# Patient Record
Sex: MALE | Race: WHITE | NOT HISPANIC OR LATINO | Employment: OTHER | URBAN - METROPOLITAN AREA
[De-identification: names, ages, dates, MRNs, and addresses within clinical notes are randomized per-mention and may not be internally consistent; named-entity substitution may affect disease eponyms.]

---

## 2023-04-19 ENCOUNTER — APPOINTMENT (OUTPATIENT)
Dept: GENERAL RADIOLOGY | Age: 43
DRG: 240 | End: 2023-04-19
Attending: STUDENT IN AN ORGANIZED HEALTH CARE EDUCATION/TRAINING PROGRAM

## 2023-04-19 ENCOUNTER — APPOINTMENT (OUTPATIENT)
Dept: CT IMAGING | Age: 43
DRG: 240 | End: 2023-04-19
Attending: STUDENT IN AN ORGANIZED HEALTH CARE EDUCATION/TRAINING PROGRAM

## 2023-04-19 ENCOUNTER — HOSPITAL ENCOUNTER (INPATIENT)
Age: 43
LOS: 2 days | Discharge: HOME | DRG: 240 | End: 2023-04-21
Attending: EMERGENCY MEDICINE | Admitting: INTERNAL MEDICINE

## 2023-04-19 DIAGNOSIS — K62.89 ANAL PAIN: Primary | ICD-10-CM

## 2023-04-19 DIAGNOSIS — K62.89 RECTAL MASS: ICD-10-CM

## 2023-04-19 LAB
ABO + RH BLD: NORMAL
ALBUMIN SERPL-MCNC: 4 G/DL (ref 3.6–5.1)
ALBUMIN/GLOB SERPL: 1.2 {RATIO} (ref 1–2.4)
ALP SERPL-CCNC: 67 UNITS/L (ref 45–117)
ALT SERPL-CCNC: 20 UNITS/L
ANION GAP SERPL CALC-SCNC: 4 MMOL/L (ref 7–19)
APPEARANCE UR: CLEAR
AST SERPL-CCNC: 13 UNITS/L
BASOPHILS # BLD: 0 K/MCL (ref 0–0.3)
BASOPHILS NFR BLD: 0 %
BILIRUB SERPL-MCNC: 0.8 MG/DL (ref 0.2–1)
BILIRUB UR QL STRIP: NEGATIVE
BLD GP AB SCN SERPL QL GEL: NEGATIVE
BUN SERPL-MCNC: 14 MG/DL (ref 6–20)
BUN/CREAT SERPL: 20 (ref 7–25)
CALCIUM SERPL-MCNC: 9.3 MG/DL (ref 8.4–10.2)
CHLORIDE SERPL-SCNC: 109 MMOL/L (ref 97–110)
CO2 SERPL-SCNC: 30 MMOL/L (ref 21–32)
COLOR UR: ABNORMAL
CREAT SERPL-MCNC: 0.7 MG/DL (ref 0.67–1.17)
DEPRECATED RDW RBC: 47.6 FL (ref 39–50)
EOSINOPHIL # BLD: 0.1 K/MCL (ref 0–0.5)
EOSINOPHIL NFR BLD: 1 %
ERYTHROCYTE [DISTWIDTH] IN BLOOD: 13.2 % (ref 11–15)
FASTING DURATION TIME PATIENT: ABNORMAL H
GFR SERPLBLD BASED ON 1.73 SQ M-ARVRAT: >90 ML/MIN
GLOBULIN SER-MCNC: 3.3 G/DL (ref 2–4)
GLUCOSE SERPL-MCNC: 112 MG/DL (ref 70–99)
GLUCOSE UR STRIP-MCNC: NEGATIVE MG/DL
HCT VFR BLD CALC: 45.8 % (ref 39–51)
HGB BLD-MCNC: 15 G/DL (ref 13–17)
HGB UR QL STRIP: NEGATIVE
IMM GRANULOCYTES # BLD AUTO: 0 K/MCL (ref 0–0.2)
IMM GRANULOCYTES # BLD: 0 %
KETONES UR STRIP-MCNC: NEGATIVE MG/DL
LEUKOCYTE ESTERASE UR QL STRIP: NEGATIVE
LIPASE SERPL-CCNC: 147 UNITS/L (ref 73–393)
LYMPHOCYTES # BLD: 2.6 K/MCL (ref 1–4.8)
LYMPHOCYTES NFR BLD: 21 %
MCH RBC QN AUTO: 31.8 PG (ref 26–34)
MCHC RBC AUTO-ENTMCNC: 32.8 G/DL (ref 32–36.5)
MCV RBC AUTO: 97 FL (ref 78–100)
MONOCYTES # BLD: 0.6 K/MCL (ref 0.3–0.9)
MONOCYTES NFR BLD: 5 %
NEUTROPHILS # BLD: 8.8 K/MCL (ref 1.8–7.7)
NEUTROPHILS NFR BLD: 73 %
NITRITE UR QL STRIP: NEGATIVE
NRBC BLD MANUAL-RTO: 0 /100 WBC
PH UR STRIP: 8.5 [PH] (ref 5–7)
PLATELET # BLD AUTO: 258 K/MCL (ref 140–450)
POTASSIUM SERPL-SCNC: 4.5 MMOL/L (ref 3.4–5.1)
PROT SERPL-MCNC: 7.3 G/DL (ref 6.4–8.2)
PROT UR STRIP-MCNC: NEGATIVE MG/DL
RBC # BLD: 4.72 MIL/MCL (ref 4.5–5.9)
SODIUM SERPL-SCNC: 138 MMOL/L (ref 135–145)
SP GR UR STRIP: <1.005 (ref 1–1.03)
TYPE AND SCREEN EXPIRATION DATE: NORMAL
UROBILINOGEN UR STRIP-MCNC: 0.2 MG/DL
WBC # BLD: 12.2 K/MCL (ref 4.2–11)

## 2023-04-19 PROCEDURE — 96360 HYDRATION IV INFUSION INIT: CPT

## 2023-04-19 PROCEDURE — 10002800 HB RX 250 W HCPCS: Performed by: INTERNAL MEDICINE

## 2023-04-19 PROCEDURE — 36415 COLL VENOUS BLD VENIPUNCTURE: CPT | Performed by: INTERNAL MEDICINE

## 2023-04-19 PROCEDURE — 71045 X-RAY EXAM CHEST 1 VIEW: CPT

## 2023-04-19 PROCEDURE — 83690 ASSAY OF LIPASE: CPT | Performed by: EMERGENCY MEDICINE

## 2023-04-19 PROCEDURE — 10002807 HB RX 258: Performed by: STUDENT IN AN ORGANIZED HEALTH CARE EDUCATION/TRAINING PROGRAM

## 2023-04-19 PROCEDURE — 82378 CARCINOEMBRYONIC ANTIGEN: CPT | Performed by: STUDENT IN AN ORGANIZED HEALTH CARE EDUCATION/TRAINING PROGRAM

## 2023-04-19 PROCEDURE — 99285 EMERGENCY DEPT VISIT HI MDM: CPT | Performed by: EMERGENCY MEDICINE

## 2023-04-19 PROCEDURE — 99223 1ST HOSP IP/OBS HIGH 75: CPT | Performed by: INTERNAL MEDICINE

## 2023-04-19 PROCEDURE — 74177 CT ABD & PELVIS W/CONTRAST: CPT

## 2023-04-19 PROCEDURE — 81003 URINALYSIS AUTO W/O SCOPE: CPT | Performed by: STUDENT IN AN ORGANIZED HEALTH CARE EDUCATION/TRAINING PROGRAM

## 2023-04-19 PROCEDURE — 80053 COMPREHEN METABOLIC PANEL: CPT | Performed by: EMERGENCY MEDICINE

## 2023-04-19 PROCEDURE — 10002803 HB RX 637: Performed by: INTERNAL MEDICINE

## 2023-04-19 PROCEDURE — 85025 COMPLETE CBC W/AUTO DIFF WBC: CPT | Performed by: EMERGENCY MEDICINE

## 2023-04-19 PROCEDURE — G1004 CDSM NDSC: HCPCS

## 2023-04-19 PROCEDURE — 99285 EMERGENCY DEPT VISIT HI MDM: CPT

## 2023-04-19 PROCEDURE — 86900 BLOOD TYPING SEROLOGIC ABO: CPT | Performed by: STUDENT IN AN ORGANIZED HEALTH CARE EDUCATION/TRAINING PROGRAM

## 2023-04-19 PROCEDURE — 10002805 HB CONTRAST AGENT: Performed by: STUDENT IN AN ORGANIZED HEALTH CARE EDUCATION/TRAINING PROGRAM

## 2023-04-19 PROCEDURE — 10004651 HB RX, NO CHARGE ITEM: Performed by: INTERNAL MEDICINE

## 2023-04-19 PROCEDURE — 10000002 HB ROOM CHARGE MED SURG

## 2023-04-19 RX ORDER — POLYETHYLENE GLYCOL 3350 17 G/17G
17 POWDER, FOR SOLUTION ORAL DAILY
Status: DISCONTINUED | OUTPATIENT
Start: 2023-04-20 | End: 2023-04-21 | Stop reason: HOSPADM

## 2023-04-19 RX ORDER — LIDOCAINE HYDROCHLORIDE 20 MG/ML
JELLY TOPICAL PRN
Status: DISCONTINUED | OUTPATIENT
Start: 2023-04-19 | End: 2023-04-21 | Stop reason: HOSPADM

## 2023-04-19 RX ORDER — 0.9 % SODIUM CHLORIDE 0.9 %
2 VIAL (ML) INJECTION EVERY 12 HOURS SCHEDULED
Status: DISCONTINUED | OUTPATIENT
Start: 2023-04-19 | End: 2023-04-21 | Stop reason: HOSPADM

## 2023-04-19 RX ORDER — ONDANSETRON 2 MG/ML
4 INJECTION INTRAMUSCULAR; INTRAVENOUS EVERY 12 HOURS PRN
Status: DISCONTINUED | OUTPATIENT
Start: 2023-04-19 | End: 2023-04-21 | Stop reason: HOSPADM

## 2023-04-19 RX ORDER — ACETAMINOPHEN 325 MG/1
650 TABLET ORAL EVERY 4 HOURS PRN
Status: DISCONTINUED | OUTPATIENT
Start: 2023-04-19 | End: 2023-04-21 | Stop reason: HOSPADM

## 2023-04-19 RX ORDER — ENOXAPARIN SODIUM 100 MG/ML
40 INJECTION SUBCUTANEOUS AT BEDTIME
Status: DISCONTINUED | OUTPATIENT
Start: 2023-04-19 | End: 2023-04-21 | Stop reason: HOSPADM

## 2023-04-19 RX ORDER — SODIUM CHLORIDE, SODIUM LACTATE, POTASSIUM CHLORIDE, CALCIUM CHLORIDE 600; 310; 30; 20 MG/100ML; MG/100ML; MG/100ML; MG/100ML
INJECTION, SOLUTION INTRAVENOUS CONTINUOUS
Status: DISCONTINUED | OUTPATIENT
Start: 2023-04-19 | End: 2023-04-20

## 2023-04-19 RX ORDER — AMOXICILLIN 250 MG
2 CAPSULE ORAL NIGHTLY
Status: DISCONTINUED | OUTPATIENT
Start: 2023-04-19 | End: 2023-04-21 | Stop reason: HOSPADM

## 2023-04-19 RX ADMIN — ENOXAPARIN SODIUM 40 MG: 40 INJECTION SUBCUTANEOUS at 21:50

## 2023-04-19 RX ADMIN — IOHEXOL 84 ML: 350 INJECTION, SOLUTION INTRAVENOUS at 17:03

## 2023-04-19 RX ADMIN — SODIUM CHLORIDE, POTASSIUM CHLORIDE, SODIUM LACTATE AND CALCIUM CHLORIDE: 600; 310; 30; 20 INJECTION, SOLUTION INTRAVENOUS at 22:26

## 2023-04-19 RX ADMIN — SODIUM CHLORIDE, PRESERVATIVE FREE 2 ML: 5 INJECTION INTRAVENOUS at 21:50

## 2023-04-19 RX ADMIN — SENNOSIDES AND DOCUSATE SODIUM 2 TABLET: 50; 8.6 TABLET ORAL at 21:50

## 2023-04-19 RX ADMIN — SODIUM CHLORIDE, POTASSIUM CHLORIDE, SODIUM LACTATE AND CALCIUM CHLORIDE 1000 ML: 600; 310; 30; 20 INJECTION, SOLUTION INTRAVENOUS at 15:02

## 2023-04-19 SDOH — HEALTH STABILITY: PHYSICAL HEALTH: DO YOU HAVE SERIOUS DIFFICULTY WALKING OR CLIMBING STAIRS?: NO

## 2023-04-19 SDOH — ECONOMIC STABILITY: HOUSING INSECURITY: WHAT IS YOUR LIVING SITUATION TODAY?: OTHER (COMMENT)

## 2023-04-19 SDOH — ECONOMIC STABILITY: TRANSPORTATION INSECURITY
IN THE PAST 12 MONTHS, HAS LACK OF TRANSPORTATION KEPT YOU FROM MEETINGS, WORK, OR FROM GETTING THINGS NEEDED FOR DAILY LIVING?: NO

## 2023-04-19 SDOH — SOCIAL STABILITY: SOCIAL NETWORK: SUPPORT SYSTEMS: FRIENDS

## 2023-04-19 SDOH — HEALTH STABILITY: GENERAL
BECAUSE OF A PHYSICAL, MENTAL, OR EMOTIONAL CONDITION, DO YOU HAVE SERIOUS DIFFICULTY CONCENTRATING, REMEMBERING OR MAKING DECISIONS?: NO

## 2023-04-19 SDOH — SOCIAL STABILITY: SOCIAL NETWORK
HOW OFTEN DO YOU SEE OR TALK TO PEOPLE THAT YOU CARE ABOUT AND FEEL CLOSE TO? (FOR EXAMPLE: TALKING TO FRIENDS ON THE PHONE, VISITING FRIENDS OR FAMILY, GOING TO CHURCH OR CLUB MEETINGS): 5 OR MORE TIMES A WEEK

## 2023-04-19 SDOH — HEALTH STABILITY: PHYSICAL HEALTH: DO YOU HAVE DIFFICULTY DRESSING OR BATHING?: NO

## 2023-04-19 SDOH — HEALTH STABILITY: GENERAL: BECAUSE OF A PHYSICAL, MENTAL, OR EMOTIONAL CONDITION, DO YOU HAVE DIFFICULTY DOING ERRANDS ALONE?: NO

## 2023-04-19 SDOH — ECONOMIC STABILITY: GENERAL

## 2023-04-19 SDOH — ECONOMIC STABILITY: HOUSING INSECURITY: WHAT IS YOUR LIVING SITUATION TODAY?: ALONE

## 2023-04-19 SDOH — ECONOMIC STABILITY: TRANSPORTATION INSECURITY
IN THE PAST 12 MONTHS, HAS THE LACK OF TRANSPORTATION KEPT YOU FROM MEDICAL APPOINTMENTS OR FROM GETTING MEDICATIONS?: NO

## 2023-04-19 SDOH — ECONOMIC STABILITY: FOOD INSECURITY: HOW OFTEN IN THE PAST 12 MONTHS WERE YOU WORRIED OR STRESSED ABOUT HAVING ENOUGH MONEY TO BUY NUTRITIOUS MEALS?: ALWAYS

## 2023-04-19 ASSESSMENT — COLUMBIA-SUICIDE SEVERITY RATING SCALE - C-SSRS
IS THE PATIENT ABLE TO COMPLETE C-SSRS: YES
6. HAVE YOU EVER DONE ANYTHING, STARTED TO DO ANYTHING, OR PREPARED TO DO ANYTHING TO END YOUR LIFE?: NO
2. HAVE YOU ACTUALLY HAD ANY THOUGHTS OF KILLING YOURSELF?: NO
1. WITHIN THE PAST MONTH, HAVE YOU WISHED YOU WERE DEAD OR WISHED YOU COULD GO TO SLEEP AND NOT WAKE UP?: NO

## 2023-04-19 ASSESSMENT — ACTIVITIES OF DAILY LIVING (ADL)
ADL_SCORE: 12
ADL_SHORT_OF_BREATH: NO
ADL_BEFORE_ADMISSION: INDEPENDENT
RECENT_DECLINE_ADL: NO

## 2023-04-19 ASSESSMENT — LIFESTYLE VARIABLES
HOW MANY STANDARD DRINKS CONTAINING ALCOHOL DO YOU HAVE ON A TYPICAL DAY: 0,1 OR 2
HOW OFTEN DO YOU HAVE A DRINK CONTAINING ALCOHOL: NEVER
ALCOHOL_USE_STATUS: NO OR LOW RISK WITH VALIDATED TOOL
AUDIT-C TOTAL SCORE: 0
HOW OFTEN DO YOU HAVE 6 OR MORE DRINKS ON ONE OCCASION: NEVER

## 2023-04-19 ASSESSMENT — PATIENT HEALTH QUESTIONNAIRE - PHQ9: IS PATIENT ABLE TO COMPLETE PHQ2 OR PHQ9: NO, PATIENT WILL NEVER BE ABLE TO COMPLETE

## 2023-04-19 ASSESSMENT — ENCOUNTER SYMPTOMS
CONSTITUTIONAL NEGATIVE: 1
EYES NEGATIVE: 1
HEMATOLOGIC/LYMPHATIC NEGATIVE: 1
ALLERGIC/IMMUNOLOGIC NEGATIVE: 1
ENDOCRINE NEGATIVE: 1
RESPIRATORY NEGATIVE: 1
PSYCHIATRIC NEGATIVE: 1
NEUROLOGICAL NEGATIVE: 1
GASTROINTESTINAL NEGATIVE: 1

## 2023-04-19 ASSESSMENT — PAIN SCALES - GENERAL
PAINLEVEL_OUTOF10: 0
PAINLEVEL_OUTOF10: 7

## 2023-04-20 ENCOUNTER — APPOINTMENT (OUTPATIENT)
Dept: CT IMAGING | Age: 43
DRG: 240 | End: 2023-04-20
Attending: STUDENT IN AN ORGANIZED HEALTH CARE EDUCATION/TRAINING PROGRAM

## 2023-04-20 LAB
ALBUMIN SERPL-MCNC: 3.2 G/DL (ref 3.6–5.1)
ALBUMIN/GLOB SERPL: 1.3 {RATIO} (ref 1–2.4)
ALP SERPL-CCNC: 53 UNITS/L (ref 45–117)
ALT SERPL-CCNC: 14 UNITS/L
ANION GAP SERPL CALC-SCNC: 9 MMOL/L (ref 7–19)
AST SERPL-CCNC: 7 UNITS/L
ATRIAL RATE (BPM): 57
BASOPHILS # BLD: 0 K/MCL (ref 0–0.3)
BASOPHILS NFR BLD: 0 %
BILIRUB SERPL-MCNC: 0.5 MG/DL (ref 0.2–1)
BUN SERPL-MCNC: 15 MG/DL (ref 6–20)
BUN/CREAT SERPL: 23 (ref 7–25)
CALCIUM SERPL-MCNC: 8.9 MG/DL (ref 8.4–10.2)
CEA SERPL-MCNC: 3.1 NG/ML (ref 0–5)
CHLORIDE SERPL-SCNC: 111 MMOL/L (ref 97–110)
CO2 SERPL-SCNC: 24 MMOL/L (ref 21–32)
CREAT SERPL-MCNC: 0.66 MG/DL (ref 0.67–1.17)
DEPRECATED RDW RBC: 47.8 FL (ref 39–50)
EOSINOPHIL # BLD: 0.3 K/MCL (ref 0–0.5)
EOSINOPHIL NFR BLD: 3 %
ERYTHROCYTE [DISTWIDTH] IN BLOOD: 13.2 % (ref 11–15)
FASTING DURATION TIME PATIENT: ABNORMAL H
GFR SERPLBLD BASED ON 1.73 SQ M-ARVRAT: >90 ML/MIN
GLOBULIN SER-MCNC: 2.5 G/DL (ref 2–4)
GLUCOSE SERPL-MCNC: 96 MG/DL (ref 70–99)
HCT VFR BLD CALC: 39.6 % (ref 39–51)
HGB BLD-MCNC: 12.8 G/DL (ref 13–17)
IMM GRANULOCYTES # BLD AUTO: 0 K/MCL (ref 0–0.2)
IMM GRANULOCYTES # BLD: 0 %
LYMPHOCYTES # BLD: 3.7 K/MCL (ref 1–4.8)
LYMPHOCYTES NFR BLD: 38 %
MAGNESIUM SERPL-MCNC: 2.1 MG/DL (ref 1.7–2.4)
MCH RBC QN AUTO: 31.6 PG (ref 26–34)
MCHC RBC AUTO-ENTMCNC: 32.3 G/DL (ref 32–36.5)
MCV RBC AUTO: 97.8 FL (ref 78–100)
MONOCYTES # BLD: 0.6 K/MCL (ref 0.3–0.9)
MONOCYTES NFR BLD: 6 %
NEUTROPHILS # BLD: 5.1 K/MCL (ref 1.8–7.7)
NEUTROPHILS NFR BLD: 53 %
NRBC BLD MANUAL-RTO: 0 /100 WBC
P AXIS (DEGREES): 50
PHOSPHATE SERPL-MCNC: 3.8 MG/DL (ref 2.4–4.7)
PLATELET # BLD AUTO: 187 K/MCL (ref 140–450)
POTASSIUM SERPL-SCNC: 4.2 MMOL/L (ref 3.4–5.1)
PR-INTERVAL (MSEC): 111
PROT SERPL-MCNC: 5.7 G/DL (ref 6.4–8.2)
QRS-INTERVAL (MSEC): 83
QT-INTERVAL (MSEC): 393
QTC: 383
R AXIS (DEGREES): 79
RBC # BLD: 4.05 MIL/MCL (ref 4.5–5.9)
REPORT TEXT: NORMAL
SODIUM SERPL-SCNC: 140 MMOL/L (ref 135–145)
T AXIS (DEGREES): 46
VENTRICULAR RATE EKG/MIN (BPM): 57
WBC # BLD: 9.7 K/MCL (ref 4.2–11)

## 2023-04-20 PROCEDURE — 71250 CT THORAX DX C-: CPT

## 2023-04-20 PROCEDURE — 99233 SBSQ HOSP IP/OBS HIGH 50: CPT | Performed by: STUDENT IN AN ORGANIZED HEALTH CARE EDUCATION/TRAINING PROGRAM

## 2023-04-20 PROCEDURE — 93010 ELECTROCARDIOGRAM REPORT: CPT | Performed by: INTERNAL MEDICINE

## 2023-04-20 PROCEDURE — 83735 ASSAY OF MAGNESIUM: CPT | Performed by: STUDENT IN AN ORGANIZED HEALTH CARE EDUCATION/TRAINING PROGRAM

## 2023-04-20 PROCEDURE — 36415 COLL VENOUS BLD VENIPUNCTURE: CPT | Performed by: INTERNAL MEDICINE

## 2023-04-20 PROCEDURE — 84100 ASSAY OF PHOSPHORUS: CPT | Performed by: STUDENT IN AN ORGANIZED HEALTH CARE EDUCATION/TRAINING PROGRAM

## 2023-04-20 PROCEDURE — 10000002 HB ROOM CHARGE MED SURG

## 2023-04-20 PROCEDURE — 10002803 HB RX 637: Performed by: INTERNAL MEDICINE

## 2023-04-20 PROCEDURE — 80053 COMPREHEN METABOLIC PANEL: CPT | Performed by: INTERNAL MEDICINE

## 2023-04-20 PROCEDURE — 10002803 HB RX 637

## 2023-04-20 PROCEDURE — 85025 COMPLETE CBC W/AUTO DIFF WBC: CPT | Performed by: INTERNAL MEDICINE

## 2023-04-20 PROCEDURE — 10004651 HB RX, NO CHARGE ITEM: Performed by: INTERNAL MEDICINE

## 2023-04-20 PROCEDURE — 93005 ELECTROCARDIOGRAM TRACING: CPT | Performed by: STUDENT IN AN ORGANIZED HEALTH CARE EDUCATION/TRAINING PROGRAM

## 2023-04-20 RX ORDER — DEXTROSE, SODIUM CHLORIDE, SODIUM LACTATE, POTASSIUM CHLORIDE, AND CALCIUM CHLORIDE 5; .6; .31; .03; .02 G/100ML; G/100ML; G/100ML; G/100ML; G/100ML
INJECTION, SOLUTION INTRAVENOUS CONTINUOUS
Status: DISCONTINUED | OUTPATIENT
Start: 2023-04-20 | End: 2023-04-21

## 2023-04-20 RX ADMIN — SENNOSIDES AND DOCUSATE SODIUM 2 TABLET: 50; 8.6 TABLET ORAL at 22:19

## 2023-04-20 RX ADMIN — SODIUM CHLORIDE, PRESERVATIVE FREE 2 ML: 5 INJECTION INTRAVENOUS at 09:56

## 2023-04-20 RX ADMIN — SODIUM CHLORIDE, PRESERVATIVE FREE 2 ML: 5 INJECTION INTRAVENOUS at 22:20

## 2023-04-20 ASSESSMENT — PAIN SCALES - GENERAL
PAINLEVEL_OUTOF10: 5
PAINLEVEL_OUTOF10: 0

## 2023-04-21 VITALS
HEIGHT: 69 IN | OXYGEN SATURATION: 99 % | BODY MASS INDEX: 18.61 KG/M2 | SYSTOLIC BLOOD PRESSURE: 123 MMHG | HEART RATE: 59 BPM | RESPIRATION RATE: 14 BRPM | WEIGHT: 125.66 LBS | DIASTOLIC BLOOD PRESSURE: 83 MMHG | TEMPERATURE: 98.1 F

## 2023-04-21 LAB
ANION GAP SERPL CALC-SCNC: 10 MMOL/L (ref 7–19)
APTT PPP: 29 SEC (ref 22–30)
BASOPHILS # BLD: 0 K/MCL (ref 0–0.3)
BASOPHILS NFR BLD: 0 %
BUN SERPL-MCNC: 11 MG/DL (ref 6–20)
BUN/CREAT SERPL: 15 (ref 7–25)
CALCIUM SERPL-MCNC: 8.9 MG/DL (ref 8.4–10.2)
CHLORIDE SERPL-SCNC: 108 MMOL/L (ref 97–110)
CO2 SERPL-SCNC: 27 MMOL/L (ref 21–32)
CREAT SERPL-MCNC: 0.74 MG/DL (ref 0.67–1.17)
DEPRECATED RDW RBC: 45.3 FL (ref 39–50)
EOSINOPHIL # BLD: 0.1 K/MCL (ref 0–0.5)
EOSINOPHIL NFR BLD: 1 %
ERYTHROCYTE [DISTWIDTH] IN BLOOD: 13.1 % (ref 11–15)
FASTING DURATION TIME PATIENT: ABNORMAL H
GFR SERPLBLD BASED ON 1.73 SQ M-ARVRAT: >90 ML/MIN
GLUCOSE SERPL-MCNC: 105 MG/DL (ref 70–99)
HCT VFR BLD CALC: 38.4 % (ref 39–51)
HGB BLD-MCNC: 13.1 G/DL (ref 13–17)
IMM GRANULOCYTES # BLD AUTO: 0 K/MCL (ref 0–0.2)
IMM GRANULOCYTES # BLD: 0 %
INR PPP: 1
LYMPHOCYTES # BLD: 3.5 K/MCL (ref 1–4.8)
LYMPHOCYTES NFR BLD: 40 %
MCH RBC QN AUTO: 32.2 PG (ref 26–34)
MCHC RBC AUTO-ENTMCNC: 34.1 G/DL (ref 32–36.5)
MCV RBC AUTO: 94.3 FL (ref 78–100)
MONOCYTES # BLD: 0.6 K/MCL (ref 0.3–0.9)
MONOCYTES NFR BLD: 6 %
NEUTROPHILS # BLD: 4.5 K/MCL (ref 1.8–7.7)
NEUTROPHILS NFR BLD: 53 %
NRBC BLD MANUAL-RTO: 0 /100 WBC
PLATELET # BLD AUTO: 199 K/MCL (ref 140–450)
POTASSIUM SERPL-SCNC: 3.7 MMOL/L (ref 3.4–5.1)
PROTHROMBIN TIME: 10.9 SEC (ref 9.7–11.8)
RBC # BLD: 4.07 MIL/MCL (ref 4.5–5.9)
SODIUM SERPL-SCNC: 141 MMOL/L (ref 135–145)
WBC # BLD: 8.7 K/MCL (ref 4.2–11)

## 2023-04-21 PROCEDURE — S0310 HOSPITALIST VISIT: HCPCS | Performed by: STUDENT IN AN ORGANIZED HEALTH CARE EDUCATION/TRAINING PROGRAM

## 2023-04-21 PROCEDURE — 80048 BASIC METABOLIC PNL TOTAL CA: CPT | Performed by: STUDENT IN AN ORGANIZED HEALTH CARE EDUCATION/TRAINING PROGRAM

## 2023-04-21 PROCEDURE — 85610 PROTHROMBIN TIME: CPT

## 2023-04-21 PROCEDURE — 99239 HOSP IP/OBS DSCHRG MGMT >30: CPT | Performed by: INTERNAL MEDICINE

## 2023-04-21 PROCEDURE — 36415 COLL VENOUS BLD VENIPUNCTURE: CPT | Performed by: STUDENT IN AN ORGANIZED HEALTH CARE EDUCATION/TRAINING PROGRAM

## 2023-04-21 PROCEDURE — 10002807 HB RX 258: Performed by: INTERNAL MEDICINE

## 2023-04-21 PROCEDURE — 85730 THROMBOPLASTIN TIME PARTIAL: CPT

## 2023-04-21 PROCEDURE — 85025 COMPLETE CBC W/AUTO DIFF WBC: CPT | Performed by: STUDENT IN AN ORGANIZED HEALTH CARE EDUCATION/TRAINING PROGRAM

## 2023-04-21 RX ORDER — POLYETHYLENE GLYCOL 3350 17 G/17G
17 POWDER, FOR SOLUTION ORAL DAILY PRN
Qty: 30 PACKET | Refills: 0 | Status: SHIPPED | COMMUNITY
Start: 2023-04-21

## 2023-04-21 RX ORDER — POTASSIUM CHLORIDE 14.9 MG/ML
20 INJECTION INTRAVENOUS ONCE
Status: DISCONTINUED | OUTPATIENT
Start: 2023-04-21 | End: 2023-04-21 | Stop reason: HOSPADM

## 2023-04-21 RX ADMIN — SODIUM CHLORIDE, SODIUM LACTATE, POTASSIUM CHLORIDE, CALCIUM CHLORIDE AND DEXTROSE MONOHYDRATE: 5; 600; 310; 30; 20 INJECTION, SOLUTION INTRAVENOUS at 00:05

## 2023-04-21 ASSESSMENT — PAIN SCALES - GENERAL: PAINLEVEL_OUTOF10: 0

## 2024-04-29 ENCOUNTER — TELEPHONE (OUTPATIENT)
Dept: HEMATOLOGY/ONCOLOGY | Facility: HOSPITAL | Age: 44
End: 2024-04-29

## 2024-05-02 ENCOUNTER — TELEPHONE (OUTPATIENT)
Dept: HEMATOLOGY/ONCOLOGY | Facility: HOSPITAL | Age: 44
End: 2024-05-02

## 2024-05-02 NOTE — TELEPHONE ENCOUNTER
Per Marleny, patient is going to discuss with his referring MD and get back to the clinic if he would like to pursue a consult.

## 2024-05-02 NOTE — TELEPHONE ENCOUNTER
Joseph called back after I have tried to make consult with dr burkett. He said he was in terrible pain very uncomfortable and emotional. 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 I offered him the healthy driven van number and I offered to pay for it if he needed it. Joseph is trying to see only a surgeon that specializes in rectal cancer. He has no interest in chemo or radiation. miles

## 2024-07-23 ENCOUNTER — TELEPHONE (OUTPATIENT)
Dept: SURGERY | Facility: CLINIC | Age: 44
End: 2024-07-23

## 2024-07-23 NOTE — TELEPHONE ENCOUNTER
Andre from [facility] calling to ask if the emergency referral for this patient was received. She states it was faxed today at 3pm. Please call.

## 2024-07-24 NOTE — TELEPHONE ENCOUNTER
[facility] calling back to confirm if referral has been received. Please call [facility] or patient back.

## 2024-07-25 NOTE — TELEPHONE ENCOUNTER
Referral recvd.  I called patient 7-.  Patient has never been to the clinic.  This patient declined the appointments  offered due to transportation issues.  Patient requested to speak to provider before appointment and  I let him know he must be established and see the provider first. Patient expressed many hardships and reports he is in chronic pain.  I gave him a strong suggestion to go to ED for acute pain.  Unfortunately Marcelina Beverly is out of the office in Aug X 2 wks.      I will have the office  call him back and try to schedule him ASAP when he has assistance he has transportation.    Tiesha

## 2024-07-26 ENCOUNTER — TELEPHONE (OUTPATIENT)
Dept: SURGERY | Facility: CLINIC | Age: 44
End: 2024-07-26

## 2024-07-26 NOTE — TELEPHONE ENCOUNTER
Patient was scheduled for 8/12 @ 11am. Patient disclosed he was very sick and requested we be as efficient about getting him in as soon as possible. Tried to explain to patient where to go for appointment but patient stated he had a headache and wish to just know date and time of appointment.

## 2024-07-31 ENCOUNTER — HOSPITAL ENCOUNTER (INPATIENT)
Age: 44
DRG: 240 | End: 2024-07-31
Attending: EMERGENCY MEDICINE | Admitting: HOSPITALIST

## 2024-07-31 DIAGNOSIS — D64.9 ACUTE ANEMIA: Primary | ICD-10-CM

## 2024-07-31 DIAGNOSIS — K62.89 RECTAL MASS: ICD-10-CM

## 2024-07-31 PROCEDURE — 36415 COLL VENOUS BLD VENIPUNCTURE: CPT

## 2024-07-31 PROCEDURE — 85025 COMPLETE CBC W/AUTO DIFF WBC: CPT | Performed by: EMERGENCY MEDICINE

## 2024-07-31 PROCEDURE — 85046 RETICYTE/HGB CONCENTRATE: CPT | Performed by: INTERNAL MEDICINE

## 2024-07-31 PROCEDURE — 86900 BLOOD TYPING SEROLOGIC ABO: CPT | Performed by: EMERGENCY MEDICINE

## 2024-07-31 PROCEDURE — 36430 TRANSFUSION BLD/BLD COMPNT: CPT

## 2024-07-31 PROCEDURE — 80053 COMPREHEN METABOLIC PANEL: CPT | Performed by: EMERGENCY MEDICINE

## 2024-07-31 PROCEDURE — 0241U COVID/FLU/RSV PANEL: CPT | Performed by: EMERGENCY MEDICINE

## 2024-07-31 PROCEDURE — 99285 EMERGENCY DEPT VISIT HI MDM: CPT | Performed by: EMERGENCY MEDICINE

## 2024-07-31 PROCEDURE — 93005 ELECTROCARDIOGRAM TRACING: CPT | Performed by: EMERGENCY MEDICINE

## 2024-07-31 PROCEDURE — 99285 EMERGENCY DEPT VISIT HI MDM: CPT

## 2024-07-31 PROCEDURE — 93010 ELECTROCARDIOGRAM REPORT: CPT | Performed by: INTERNAL MEDICINE

## 2024-07-31 PROCEDURE — 83615 LACTATE (LD) (LDH) ENZYME: CPT | Performed by: INTERNAL MEDICINE

## 2024-07-31 SDOH — SOCIAL STABILITY: SOCIAL INSECURITY: HOW OFTEN DOES ANYONE, INCLUDING FAMILY AND FRIENDS, PHYSICALLY HURT YOU?: NEVER

## 2024-07-31 SDOH — SOCIAL STABILITY: SOCIAL INSECURITY: HOW OFTEN DOES ANYONE, INCLUDING FAMILY AND FRIENDS, SCREAM OR CURSE AT YOU?: NEVER

## 2024-07-31 SDOH — SOCIAL STABILITY: SOCIAL INSECURITY: HOW OFTEN DOES ANYONE, INCLUDING FAMILY AND FRIENDS, INSULT OR TALK DOWN TO YOU?: NEVER

## 2024-07-31 SDOH — SOCIAL STABILITY: SOCIAL INSECURITY: HOW OFTEN DOES ANYONE, INCLUDING FAMILY AND FRIENDS, THREATEN YOU WITH HARM?: NEVER

## 2024-07-31 ASSESSMENT — PAIN SCALES - GENERAL: PAINLEVEL_OUTOF10: 9

## 2024-08-01 ENCOUNTER — APPOINTMENT (OUTPATIENT)
Dept: CT IMAGING | Age: 44
DRG: 240 | End: 2024-08-01
Attending: INTERNAL MEDICINE

## 2024-08-01 PROBLEM — D64.9 ACUTE ANEMIA: Status: ACTIVE | Noted: 2024-08-01

## 2024-08-01 LAB
ABO + RH BLD: NORMAL
ALBUMIN SERPL-MCNC: 1.9 G/DL (ref 3.6–5.1)
ALBUMIN/GLOB SERPL: 0.5 {RATIO} (ref 1–2.4)
ALP SERPL-CCNC: 65 UNITS/L (ref 45–117)
ALT SERPL-CCNC: 9 UNITS/L
ANION GAP SERPL CALC-SCNC: 10 MMOL/L (ref 7–19)
ANNOTATION COMMENT IMP: NORMAL
APTT PPP: 29 SEC (ref 22–32)
AST SERPL-CCNC: 7 UNITS/L
ATRIAL RATE (BPM): 107
BASOPHILS # BLD: 0 K/MCL (ref 0–0.3)
BASOPHILS NFR BLD: 0 %
BILIRUB SERPL-MCNC: 0.1 MG/DL (ref 0.2–1)
BLD GP AB SCN SERPL QL GEL: NEGATIVE
BLOOD EXPIRATION DATE: NORMAL
BUN SERPL-MCNC: 11 MG/DL (ref 6–20)
BUN/CREAT SERPL: 17 (ref 7–25)
CALCIUM SERPL-MCNC: 8.5 MG/DL (ref 8.4–10.2)
CEA SERPL-MCNC: <2 NG/ML (ref 0–5)
CHLORIDE SERPL-SCNC: 107 MMOL/L (ref 97–110)
CO2 SERPL-SCNC: 27 MMOL/L (ref 21–32)
CREAT SERPL-MCNC: 0.63 MG/DL (ref 0.67–1.17)
CROSSMATCH RESULT: NORMAL
DEPRECATED RDW RBC: 48.6 FL (ref 39–50)
DISPENSE STATUS: NORMAL
EGFRCR SERPLBLD CKD-EPI 2021: >90 ML/MIN/{1.73_M2}
EOSINOPHIL # BLD: 0.1 K/MCL (ref 0–0.5)
EOSINOPHIL NFR BLD: 1 %
ERYTHROCYTE [DISTWIDTH] IN BLOOD: 18.1 % (ref 11–15)
FASTING DURATION TIME PATIENT: ABNORMAL H
FERRITIN SERPL-MCNC: 11 NG/ML (ref 26–388)
FLUAV RNA RESP QL NAA+PROBE: NOT DETECTED
FLUBV RNA RESP QL NAA+PROBE: NOT DETECTED
FOLATE SERPL-MCNC: 4.1 NG/ML
GLOBULIN SER-MCNC: 4.2 G/DL (ref 2–4)
GLUCOSE SERPL-MCNC: 104 MG/DL (ref 70–99)
HAPTOGLOB SERPL-MCNC: 347 MG/DL (ref 30–200)
HAV IGM SER QL: NEGATIVE
HBV CORE IGM SER QL: NEGATIVE
HBV SURFACE AB SER QL: NEGATIVE
HBV SURFACE AG SER QL: NEGATIVE
HCT VFR BLD CALC: 17.6 % (ref 39–51)
HCT VFR BLD CALC: 25.1 % (ref 39–51)
HCT VFR BLD CALC: 26.4 % (ref 39–51)
HCV AB SER QL: NEGATIVE
HGB BLD-MCNC: 4.9 G/DL (ref 13–17)
HGB BLD-MCNC: 7.5 G/DL (ref 13–17)
HGB BLD-MCNC: 7.6 G/DL (ref 13–17)
HGB RETIC QN AUTO: 16.9 PG (ref 28.6–36.3)
HIV 1+2 AB+HIV1 P24 AG SERPL QL IA: NONREACTIVE
IMM GRANULOCYTES # BLD AUTO: 0.1 K/MCL (ref 0–0.2)
IMM GRANULOCYTES # BLD: 1 %
IMM RETICS NFR: 30.2 % (ref 1.5–16)
IMP & REVIEW OF LAB RESULTS: NORMAL
INR PPP: 1
IRON SATN MFR SERPL: 25 % (ref 15–45)
IRON SERPL-MCNC: 60 MCG/DL (ref 65–175)
ISBT BLOOD TYPE: 600
ISSUE DATE/TIME: NORMAL
ISSUE DATE/TIME: NORMAL
LDH SERPL L TO P-CCNC: 193 UNITS/L (ref 86–234)
LYMPHOCYTES # BLD: 2.3 K/MCL (ref 1–4.8)
LYMPHOCYTES NFR BLD: 15 %
MCH RBC QN AUTO: 20.5 PG (ref 26–34)
MCHC RBC AUTO-ENTMCNC: 27.8 G/DL (ref 32–36.5)
MCV RBC AUTO: 73.6 FL (ref 78–100)
MONOCYTES # BLD: 0.9 K/MCL (ref 0.3–0.9)
MONOCYTES NFR BLD: 6 %
NEUTROPHILS # BLD: 12 K/MCL (ref 1.8–7.7)
NEUTROPHILS NFR BLD: 77 %
NRBC BLD MANUAL-RTO: 0 /100 WBC
P AXIS (DEGREES): 83
PLATELET # BLD AUTO: 721 K/MCL (ref 140–450)
POTASSIUM SERPL-SCNC: 3.5 MMOL/L (ref 3.4–5.1)
PR-INTERVAL (MSEC): 106
PRODUCT CODE: NORMAL
PRODUCT DESCRIPTION: NORMAL
PRODUCT ID: NORMAL
PROT SERPL-MCNC: 6.1 G/DL (ref 6.4–8.2)
PROTHROMBIN TIME: 11 SEC (ref 9.7–11.8)
QRS-INTERVAL (MSEC): 86
QT-INTERVAL (MSEC): 320
QTC: 427
R AXIS (DEGREES): 88
RAINBOW EXTRA TUBES HOLD SPECIMEN: NORMAL
RBC # BLD: 2.39 MIL/MCL (ref 4.5–5.9)
REPORT TEXT: NORMAL
RETICS #: 58 K/MCL (ref 10–120)
RETICS/RBC NFR: 2.4 % (ref 0.3–2.5)
RSV AG NPH QL IA.RAPID: NOT DETECTED
SARS-COV-2 RNA RESP QL NAA+PROBE: NOT DETECTED
SERVICE CMNT-IMP: NORMAL
SERVICE CMNT-IMP: NORMAL
SODIUM SERPL-SCNC: 140 MMOL/L (ref 135–145)
T AXIS (DEGREES): 64
TIBC SERPL-MCNC: 237 MCG/DL (ref 250–450)
TYPE AND SCREEN EXPIRATION DATE: NORMAL
UNIT BLOOD TYPE: NORMAL
UNIT NUMBER: NORMAL
VENTRICULAR RATE EKG/MIN (BPM): 107
VIT B12 SERPL-MCNC: 264 PG/ML (ref 211–911)
WBC # BLD: 15.4 K/MCL (ref 4.2–11)

## 2024-08-01 PROCEDURE — 87389 HIV-1 AG W/HIV-1&-2 AB AG IA: CPT | Performed by: INTERNAL MEDICINE

## 2024-08-01 PROCEDURE — 99497 ADVNCD CARE PLAN 30 MIN: CPT | Performed by: NURSE PRACTITIONER

## 2024-08-01 PROCEDURE — 85610 PROTHROMBIN TIME: CPT | Performed by: INTERNAL MEDICINE

## 2024-08-01 PROCEDURE — 99223 1ST HOSP IP/OBS HIGH 75: CPT | Performed by: NURSE PRACTITIONER

## 2024-08-01 PROCEDURE — 10002803 HB RX 637: Performed by: EMERGENCY MEDICINE

## 2024-08-01 PROCEDURE — 86923 COMPATIBILITY TEST ELECTRIC: CPT

## 2024-08-01 PROCEDURE — P9016 RBC LEUKOCYTES REDUCED: HCPCS

## 2024-08-01 PROCEDURE — 80074 ACUTE HEPATITIS PANEL: CPT | Performed by: INTERNAL MEDICINE

## 2024-08-01 PROCEDURE — 85730 THROMBOPLASTIN TIME PARTIAL: CPT | Performed by: INTERNAL MEDICINE

## 2024-08-01 PROCEDURE — 10002803 HB RX 637

## 2024-08-01 PROCEDURE — 86920 COMPATIBILITY TEST SPIN: CPT

## 2024-08-01 PROCEDURE — 82607 VITAMIN B-12: CPT | Performed by: INTERNAL MEDICINE

## 2024-08-01 PROCEDURE — 10002807 HB RX 258: Performed by: INTERNAL MEDICINE

## 2024-08-01 PROCEDURE — 10002800 HB RX 250 W HCPCS: Performed by: HOSPITALIST

## 2024-08-01 PROCEDURE — 85014 HEMATOCRIT: CPT

## 2024-08-01 PROCEDURE — 83010 ASSAY OF HAPTOGLOBIN QUANT: CPT | Performed by: INTERNAL MEDICINE

## 2024-08-01 PROCEDURE — 10000002 HB ROOM CHARGE MED SURG

## 2024-08-01 PROCEDURE — 82728 ASSAY OF FERRITIN: CPT | Performed by: INTERNAL MEDICINE

## 2024-08-01 PROCEDURE — 85018 HEMOGLOBIN: CPT

## 2024-08-01 PROCEDURE — 10002800 HB RX 250 W HCPCS: Performed by: NURSE PRACTITIONER

## 2024-08-01 PROCEDURE — 36430 TRANSFUSION BLD/BLD COMPNT: CPT

## 2024-08-01 PROCEDURE — 86706 HEP B SURFACE ANTIBODY: CPT | Performed by: INTERNAL MEDICINE

## 2024-08-01 PROCEDURE — 87040 BLOOD CULTURE FOR BACTERIA: CPT

## 2024-08-01 PROCEDURE — 10002807 HB RX 258

## 2024-08-01 PROCEDURE — 10002800 HB RX 250 W HCPCS: Performed by: INTERNAL MEDICINE

## 2024-08-01 PROCEDURE — 36415 COLL VENOUS BLD VENIPUNCTURE: CPT

## 2024-08-01 PROCEDURE — 83550 IRON BINDING TEST: CPT | Performed by: INTERNAL MEDICINE

## 2024-08-01 PROCEDURE — 10002805 HB CONTRAST AGENT: Performed by: INTERNAL MEDICINE

## 2024-08-01 PROCEDURE — 82378 CARCINOEMBRYONIC ANTIGEN: CPT | Performed by: INTERNAL MEDICINE

## 2024-08-01 PROCEDURE — 10002803 HB RX 637: Performed by: NURSE PRACTITIONER

## 2024-08-01 RX ORDER — ACETAMINOPHEN 160 MG/5ML
1000 LIQUID ORAL ONCE
Status: COMPLETED | OUTPATIENT
Start: 2024-08-01 | End: 2024-08-01

## 2024-08-01 RX ORDER — LACTULOSE 10 G/15ML
10-20 SOLUTION ORAL PRN
COMMUNITY

## 2024-08-01 RX ORDER — FOLIC ACID 1 MG/1
1 TABLET ORAL DAILY
Status: DISCONTINUED | OUTPATIENT
Start: 2024-08-02 | End: 2024-08-04 | Stop reason: HOSPADM

## 2024-08-01 RX ORDER — POTASSIUM CHLORIDE 1500 MG/1
20 TABLET, EXTENDED RELEASE ORAL ONCE
Status: COMPLETED | OUTPATIENT
Start: 2024-08-01 | End: 2024-08-01

## 2024-08-01 RX ORDER — SODIUM CHLORIDE 9 MG/ML
INJECTION, SOLUTION INTRAVENOUS CONTINUOUS
Status: DISCONTINUED | OUTPATIENT
Start: 2024-08-01 | End: 2024-08-03

## 2024-08-01 RX ORDER — LACTULOSE 10 G/15ML
10 SOLUTION ORAL DAILY PRN
Status: DISCONTINUED | OUTPATIENT
Start: 2024-08-01 | End: 2024-08-04 | Stop reason: HOSPADM

## 2024-08-01 RX ORDER — SODIUM CHLORIDE 9 MG/ML
INJECTION, SOLUTION INTRAVENOUS CONTINUOUS PRN
Status: DISCONTINUED | OUTPATIENT
Start: 2024-08-01 | End: 2024-08-04 | Stop reason: HOSPADM

## 2024-08-01 RX ORDER — POTASSIUM CHLORIDE 14.9 MG/ML
20 INJECTION INTRAVENOUS ONCE
Status: DISCONTINUED | OUTPATIENT
Start: 2024-08-01 | End: 2024-08-01

## 2024-08-01 RX ORDER — LEVOFLOXACIN 750 MG/1
750 TABLET, FILM COATED ORAL DAILY
COMMUNITY
Start: 2024-08-05 | End: 2024-08-18

## 2024-08-01 RX ORDER — ACETAMINOPHEN 650 MG/1
650 SUPPOSITORY RECTAL EVERY 4 HOURS PRN
Status: DISCONTINUED | OUTPATIENT
Start: 2024-08-01 | End: 2024-08-01

## 2024-08-01 RX ORDER — 0.9 % SODIUM CHLORIDE 0.9 %
2 VIAL (ML) INJECTION EVERY 12 HOURS SCHEDULED
Status: DISCONTINUED | OUTPATIENT
Start: 2024-08-01 | End: 2024-08-04 | Stop reason: HOSPADM

## 2024-08-01 RX ORDER — LIDOCAINE 40 MG/G
1 CREAM TOPICAL PRN
COMMUNITY

## 2024-08-01 RX ORDER — ACETAMINOPHEN 325 MG/1
975 TABLET ORAL ONCE
Status: DISCONTINUED | OUTPATIENT
Start: 2024-08-01 | End: 2024-08-01

## 2024-08-01 RX ORDER — ACETAMINOPHEN 160 MG/5ML
500-1000 LIQUID ORAL
COMMUNITY

## 2024-08-01 RX ORDER — ACETAMINOPHEN 325 MG/1
650 TABLET ORAL EVERY 4 HOURS PRN
Status: DISCONTINUED | OUTPATIENT
Start: 2024-08-01 | End: 2024-08-01 | Stop reason: CLARIF

## 2024-08-01 RX ORDER — HYDROCODONE BITARTRATE AND ACETAMINOPHEN 7.5; 325 MG/15ML; MG/15ML
15 SOLUTION ORAL EVERY 4 HOURS PRN
Status: DISCONTINUED | OUTPATIENT
Start: 2024-08-01 | End: 2024-08-04 | Stop reason: HOSPADM

## 2024-08-01 RX ORDER — POTASSIUM CHLORIDE 14.9 MG/ML
20 INJECTION INTRAVENOUS ONCE
Status: COMPLETED | OUTPATIENT
Start: 2024-08-01 | End: 2024-08-01

## 2024-08-01 RX ORDER — ACETAMINOPHEN 160 MG/5ML
325 LIQUID ORAL EVERY 4 HOURS PRN
Status: DISCONTINUED | OUTPATIENT
Start: 2024-08-01 | End: 2024-08-04 | Stop reason: HOSPADM

## 2024-08-01 RX ADMIN — ACETAMINOPHEN 325 MG: 650 SOLUTION ORAL at 21:47

## 2024-08-01 RX ADMIN — POTASSIUM CHLORIDE 20 MEQ: 14.9 INJECTION, SOLUTION INTRAVENOUS at 18:28

## 2024-08-01 RX ADMIN — PIPERACILLIN AND TAZOBACTAM 3.38 G: 3; .375 INJECTION, POWDER, FOR SOLUTION INTRAVENOUS at 21:41

## 2024-08-01 RX ADMIN — POTASSIUM CHLORIDE 20 MEQ: 1500 TABLET, EXTENDED RELEASE ORAL at 21:44

## 2024-08-01 RX ADMIN — SODIUM CHLORIDE: 9 INJECTION, SOLUTION INTRAVENOUS at 18:44

## 2024-08-01 RX ADMIN — ACETAMINOPHEN 1000 MG: 650 SOLUTION ORAL at 01:20

## 2024-08-01 RX ADMIN — MORPHINE SULFATE 2 MG: 2 INJECTION, SOLUTION INTRAMUSCULAR; INTRAVENOUS at 10:44

## 2024-08-01 RX ADMIN — ACETAMINOPHEN 1000 MG: 650 SOLUTION ORAL at 06:29

## 2024-08-01 RX ADMIN — IOHEXOL 12.5 ML: 350 INJECTION, SOLUTION INTRAVENOUS at 10:46

## 2024-08-01 RX ADMIN — PIPERACILLIN AND TAZOBACTAM 3.38 G: 3; .375 INJECTION, POWDER, LYOPHILIZED, FOR SOLUTION INTRAVENOUS; PARENTERAL at 16:07

## 2024-08-01 RX ADMIN — ACETAMINOPHEN 325 MG: 650 SOLUTION ORAL at 15:59

## 2024-08-01 RX ADMIN — HYDROCODONE BITARTRATE AND ACETAMINOPHEN 15 ML: 7.5; 325 SOLUTION ORAL at 12:31

## 2024-08-01 SDOH — HEALTH STABILITY: PHYSICAL HEALTH: DO YOU HAVE DIFFICULTY DRESSING OR BATHING?: NO

## 2024-08-01 SDOH — HEALTH STABILITY: PHYSICAL HEALTH: DO YOU HAVE SERIOUS DIFFICULTY WALKING OR CLIMBING STAIRS?: NO

## 2024-08-01 SDOH — ECONOMIC STABILITY: GENERAL

## 2024-08-01 SDOH — ECONOMIC STABILITY: FOOD INSECURITY: WITHIN THE PAST 12 MONTHS, THE FOOD YOU BOUGHT JUST DIDN'T LAST AND YOU DIDN'T HAVE MONEY TO GET MORE.: NEVER TRUE

## 2024-08-01 SDOH — SOCIAL STABILITY: SOCIAL INSECURITY: HOW OFTEN DOES ANYONE, INCLUDING FAMILY AND FRIENDS, THREATEN YOU WITH HARM?: NEVER

## 2024-08-01 SDOH — ECONOMIC STABILITY: HOUSING INSECURITY: WHAT IS YOUR LIVING SITUATION TODAY?: LIVING WITH OTHERS (TEMPORARY)

## 2024-08-01 SDOH — SOCIAL STABILITY: SOCIAL INSECURITY: HOW OFTEN DOES ANYONE, INCLUDING FAMILY AND FRIENDS, SCREAM OR CURSE AT YOU?: NEVER

## 2024-08-01 SDOH — ECONOMIC STABILITY: INCOME INSECURITY: IN THE PAST 12 MONTHS, HAS THE ELECTRIC, GAS, OIL, OR WATER COMPANY THREATENED TO SHUT OFF SERVICE IN YOUR HOME?: NO

## 2024-08-01 SDOH — ECONOMIC STABILITY: GENERAL: WOULD YOU LIKE HELP WITH ANY OF THE FOLLOWING NEEDS?: I DON'T WANT HELP WITH ANY OF THESE

## 2024-08-01 SDOH — SOCIAL STABILITY: SOCIAL NETWORK: SUPPORT SYSTEMS: FRIENDS

## 2024-08-01 SDOH — SOCIAL STABILITY: SOCIAL INSECURITY: HOW OFTEN DOES ANYONE, INCLUDING FAMILY AND FRIENDS, INSULT OR TALK DOWN TO YOU?: NEVER

## 2024-08-01 SDOH — ECONOMIC STABILITY: TRANSPORTATION INSECURITY
IN THE PAST 12 MONTHS, HAS LACK OF RELIABLE TRANSPORTATION KEPT YOU FROM MEDICAL APPOINTMENTS, MEETINGS, WORK OR FROM GETTING THINGS NEEDED FOR DAILY LIVING?: NO

## 2024-08-01 SDOH — ECONOMIC STABILITY: HOUSING INSECURITY: WHAT IS YOUR LIVING SITUATION TODAY?: ALONE

## 2024-08-01 SDOH — HEALTH STABILITY: GENERAL: BECAUSE OF A PHYSICAL, MENTAL, OR EMOTIONAL CONDITION, DO YOU HAVE DIFFICULTY DOING ERRANDS ALONE?: NO

## 2024-08-01 SDOH — ECONOMIC STABILITY: HOUSING INSECURITY: DO YOU HAVE PROBLEMS WITH ANY OF THE FOLLOWING?: NONE OF THE ABOVE

## 2024-08-01 SDOH — SOCIAL STABILITY: SOCIAL INSECURITY: HOW OFTEN DOES ANYONE, INCLUDING FAMILY AND FRIENDS, PHYSICALLY HURT YOU?: NEVER

## 2024-08-01 SDOH — ECONOMIC STABILITY: HOUSING INSECURITY: WHAT IS YOUR LIVING SITUATION TODAY?: I HAVE A STEADY PLACE TO LIVE

## 2024-08-01 ASSESSMENT — ENCOUNTER SYMPTOMS
SHORTNESS OF BREATH: 0
UNEXPECTED WEIGHT CHANGE: 1
NAUSEA: 0
DIZZINESS: 0
BLOOD IN STOOL: 0
FEVER: 0
CONSTIPATION: 0
VOMITING: 0
BLOOD IN STOOL: 1
HEADACHES: 0
ABDOMINAL PAIN: 0
TREMORS: 0
TROUBLE SWALLOWING: 1
COUGH: 0
UNEXPECTED WEIGHT CHANGE: 0
DIARRHEA: 0
NUMBNESS: 0
CONSTIPATION: 1
RESPIRATORY NEGATIVE: 1
PSYCHIATRIC NEGATIVE: 1
LIGHT-HEADEDNESS: 0
ANAL BLEEDING: 1
SPEECH DIFFICULTY: 0
ACTIVITY CHANGE: 1
FATIGUE: 1
APPETITE CHANGE: 1
ADENOPATHY: 0
WEAKNESS: 1
CHILLS: 0
ABDOMINAL PAIN: 1
RECTAL PAIN: 1
FACIAL ASYMMETRY: 0
DIAPHORESIS: 0
ABDOMINAL DISTENTION: 0

## 2024-08-01 ASSESSMENT — ACTIVITIES OF DAILY LIVING (ADL)
BATHING: INDEPENDENT
FEEDING: INDEPENDENT
DRESSING: INDEPENDENT
ADL_SHORT_OF_BREATH: NO
ADL_BEFORE_ADMISSION: INDEPENDENT
RECENT_DECLINE_ADL: NO
ADL_SCORE: 24
TOILETING: INDEPENDENT

## 2024-08-01 ASSESSMENT — COLUMBIA-SUICIDE SEVERITY RATING SCALE - C-SSRS
2. HAVE YOU ACTUALLY HAD ANY THOUGHTS OF KILLING YOURSELF?: NO
6. HAVE YOU EVER DONE ANYTHING, STARTED TO DO ANYTHING, OR PREPARED TO DO ANYTHING TO END YOUR LIFE?: NO
IS THE PATIENT ABLE TO COMPLETE C-SSRS: YES
1. WITHIN THE PAST MONTH, HAVE YOU WISHED YOU WERE DEAD OR WISHED YOU COULD GO TO SLEEP AND NOT WAKE UP?: NO

## 2024-08-01 ASSESSMENT — PATIENT HEALTH QUESTIONNAIRE - PHQ9
SUM OF ALL RESPONSES TO PHQ9 QUESTIONS 1 AND 2: 0
IS PATIENT ABLE TO COMPLETE PHQ2 OR PHQ9: YES
SUM OF ALL RESPONSES TO PHQ9 QUESTIONS 1 AND 2: 0
2. FEELING DOWN, DEPRESSED OR HOPELESS: NOT AT ALL
1. LITTLE INTEREST OR PLEASURE IN DOING THINGS: NOT AT ALL
CLINICAL INTERPRETATION OF PHQ2 SCORE: NO FURTHER SCREENING NEEDED

## 2024-08-01 ASSESSMENT — PAIN SCALES - GENERAL
PAINLEVEL_OUTOF10: 9
PAINLEVEL_OUTOF10: 7
PAINLEVEL_OUTOF10: 4
PAINLEVEL_OUTOF10: 5
PAINLEVEL_OUTOF10: 8

## 2024-08-01 ASSESSMENT — LIFESTYLE VARIABLES
HOW OFTEN DO YOU HAVE A DRINK CONTAINING ALCOHOL: NEVER
HOW OFTEN DO YOU HAVE 6 OR MORE DRINKS ON ONE OCCASION: NEVER
AUDIT-C TOTAL SCORE: 0
ALCOHOL_USE_STATUS: NO OR LOW RISK WITH VALIDATED TOOL
HOW MANY STANDARD DRINKS CONTAINING ALCOHOL DO YOU HAVE ON A TYPICAL DAY: 0,1 OR 2

## 2024-08-02 ENCOUNTER — APPOINTMENT (OUTPATIENT)
Dept: RADIATION ONCOLOGY | Age: 44
DRG: 240 | End: 2024-08-02
Attending: EMERGENCY MEDICINE

## 2024-08-02 ENCOUNTER — APPOINTMENT (OUTPATIENT)
Dept: MRI IMAGING | Age: 44
DRG: 240 | End: 2024-08-02
Attending: INTERNAL MEDICINE

## 2024-08-02 LAB
ALBUMIN SERPL-MCNC: 1.9 G/DL (ref 3.6–5.1)
ALBUMIN/GLOB SERPL: 0.5 {RATIO} (ref 1–2.4)
ALP SERPL-CCNC: 61 UNITS/L (ref 45–117)
ALT SERPL-CCNC: 11 UNITS/L
ANION GAP SERPL CALC-SCNC: 8 MMOL/L (ref 7–19)
AST SERPL-CCNC: 5 UNITS/L
BASOPHILS # BLD: 0 K/MCL (ref 0–0.3)
BASOPHILS NFR BLD: 0 %
BILIRUB SERPL-MCNC: 0.3 MG/DL (ref 0.2–1)
BUN SERPL-MCNC: 11 MG/DL (ref 6–20)
BUN/CREAT SERPL: 19 (ref 7–25)
CALCIUM SERPL-MCNC: 8.2 MG/DL (ref 8.4–10.2)
CHLORIDE SERPL-SCNC: 109 MMOL/L (ref 97–110)
CO2 SERPL-SCNC: 26 MMOL/L (ref 21–32)
CREAT SERPL-MCNC: 0.57 MG/DL (ref 0.67–1.17)
DEPRECATED RDW RBC: 55.8 FL (ref 39–50)
EGFRCR SERPLBLD CKD-EPI 2021: >90 ML/MIN/{1.73_M2}
EOSINOPHIL # BLD: 0.1 K/MCL (ref 0–0.5)
EOSINOPHIL NFR BLD: 1 %
ERYTHROCYTE [DISTWIDTH] IN BLOOD: 19.4 % (ref 11–15)
FASTING DURATION TIME PATIENT: ABNORMAL H
GLOBULIN SER-MCNC: 4.2 G/DL (ref 2–4)
GLUCOSE SERPL-MCNC: 120 MG/DL (ref 70–99)
HCT VFR BLD CALC: 25.3 % (ref 39–51)
HGB BLD-MCNC: 7.4 G/DL (ref 13–17)
IMM GRANULOCYTES # BLD AUTO: 0.1 K/MCL (ref 0–0.2)
IMM GRANULOCYTES # BLD: 1 %
LYMPHOCYTES # BLD: 1.7 K/MCL (ref 1–4.8)
LYMPHOCYTES NFR BLD: 10 %
MAGNESIUM SERPL-MCNC: 2.2 MG/DL (ref 1.7–2.4)
MCH RBC QN AUTO: 23.3 PG (ref 26–34)
MCHC RBC AUTO-ENTMCNC: 29.2 G/DL (ref 32–36.5)
MCV RBC AUTO: 79.8 FL (ref 78–100)
MONOCYTES # BLD: 0.9 K/MCL (ref 0.3–0.9)
MONOCYTES NFR BLD: 5 %
NEUTROPHILS # BLD: 15.2 K/MCL (ref 1.8–7.7)
NEUTROPHILS NFR BLD: 83 %
NRBC BLD MANUAL-RTO: 0 /100 WBC
PHOSPHATE SERPL-MCNC: 3.2 MG/DL (ref 2.4–4.7)
PLATELET # BLD AUTO: 636 K/MCL (ref 140–450)
POTASSIUM SERPL-SCNC: 3.5 MMOL/L (ref 3.4–5.1)
PROT SERPL-MCNC: 6.1 G/DL (ref 6.4–8.2)
RBC # BLD: 3.17 MIL/MCL (ref 4.5–5.9)
SODIUM SERPL-SCNC: 139 MMOL/L (ref 135–145)
WBC # BLD: 18.1 K/MCL (ref 4.2–11)

## 2024-08-02 PROCEDURE — 83735 ASSAY OF MAGNESIUM: CPT

## 2024-08-02 PROCEDURE — 74183 MRI ABD W/O CNTR FLWD CNTR: CPT

## 2024-08-02 PROCEDURE — 36415 COLL VENOUS BLD VENIPUNCTURE: CPT

## 2024-08-02 PROCEDURE — 10002803 HB RX 637

## 2024-08-02 PROCEDURE — 99233 SBSQ HOSP IP/OBS HIGH 50: CPT | Performed by: NURSE PRACTITIONER

## 2024-08-02 PROCEDURE — 10002801 HB RX 250 W/O HCPCS: Performed by: STUDENT IN AN ORGANIZED HEALTH CARE EDUCATION/TRAINING PROGRAM

## 2024-08-02 PROCEDURE — A9585 GADOBUTROL INJECTION: HCPCS | Performed by: INTERNAL MEDICINE

## 2024-08-02 PROCEDURE — 80053 COMPREHEN METABOLIC PANEL: CPT

## 2024-08-02 PROCEDURE — 85025 COMPLETE CBC W/AUTO DIFF WBC: CPT

## 2024-08-02 PROCEDURE — 88305 TISSUE EXAM BY PATHOLOGIST: CPT | Performed by: STUDENT IN AN ORGANIZED HEALTH CARE EDUCATION/TRAINING PROGRAM

## 2024-08-02 PROCEDURE — 10002805 HB CONTRAST AGENT: Performed by: INTERNAL MEDICINE

## 2024-08-02 PROCEDURE — 72197 MRI PELVIS W/O & W/DYE: CPT

## 2024-08-02 PROCEDURE — 0DBQ0ZX EXCISION OF ANUS, OPEN APPROACH, DIAGNOSTIC: ICD-10-PCS | Performed by: SURGERY

## 2024-08-02 PROCEDURE — 10000002 HB ROOM CHARGE MED SURG

## 2024-08-02 PROCEDURE — 10002803 HB RX 637: Performed by: NURSE PRACTITIONER

## 2024-08-02 PROCEDURE — 10002803 HB RX 637: Performed by: INTERNAL MEDICINE

## 2024-08-02 PROCEDURE — 84100 ASSAY OF PHOSPHORUS: CPT

## 2024-08-02 RX ORDER — FERROUS SULFATE 325(65) MG
325 TABLET ORAL EVERY OTHER DAY
Status: DISCONTINUED | OUTPATIENT
Start: 2024-08-03 | End: 2024-08-04 | Stop reason: HOSPADM

## 2024-08-02 RX ORDER — LIDOCAINE HYDROCHLORIDE 10 MG/ML
5 INJECTION, SOLUTION INFILTRATION; PERINEURAL ONCE
Status: COMPLETED | OUTPATIENT
Start: 2024-08-02 | End: 2024-08-02

## 2024-08-02 RX ORDER — GADOBUTROL 604.72 MG/ML
5 INJECTION INTRAVENOUS ONCE
Status: COMPLETED | OUTPATIENT
Start: 2024-08-02 | End: 2024-08-02

## 2024-08-02 RX ORDER — LIDOCAINE 4 G/G
1 PATCH TOPICAL DAILY
Status: DISCONTINUED | OUTPATIENT
Start: 2024-08-02 | End: 2024-08-04 | Stop reason: HOSPADM

## 2024-08-02 RX ADMIN — ACETAMINOPHEN 325 MG: 650 SOLUTION ORAL at 04:52

## 2024-08-02 RX ADMIN — LACTULOSE 10 G: 10 SOLUTION ORAL at 19:46

## 2024-08-02 RX ADMIN — ACETAMINOPHEN 325 MG: 650 SOLUTION ORAL at 11:13

## 2024-08-02 RX ADMIN — HYDROCODONE BITARTRATE AND ACETAMINOPHEN 15 ML: 7.5; 325 SOLUTION ORAL at 20:40

## 2024-08-02 RX ADMIN — HYDROCODONE BITARTRATE AND ACETAMINOPHEN 15 ML: 7.5; 325 SOLUTION ORAL at 06:05

## 2024-08-02 RX ADMIN — LIDOCAINE HYDROCHLORIDE 50 MG: 10 INJECTION, SOLUTION INFILTRATION; PERINEURAL at 13:37

## 2024-08-02 RX ADMIN — LIDOCAINE 1 PATCH: 4 PATCH TOPICAL at 14:16

## 2024-08-02 RX ADMIN — GADOBUTROL 5 ML: 604.72 INJECTION INTRAVENOUS at 22:42

## 2024-08-02 RX ADMIN — ACETAMINOPHEN 325 MG: 650 SOLUTION ORAL at 20:34

## 2024-08-02 RX ADMIN — FOLIC ACID 1 MG: 1 TABLET ORAL at 11:12

## 2024-08-02 ASSESSMENT — PAIN SCALES - GENERAL
PAINLEVEL_OUTOF10: 8
PAINLEVEL_OUTOF10: 8
PAINLEVEL_OUTOF10: 5
PAINLEVEL_OUTOF10: 7
PAINLEVEL_OUTOF10: 8
PAINLEVEL_OUTOF10: 5
PAINLEVEL_OUTOF10: 8
PAINLEVEL_OUTOF10: 8

## 2024-08-02 ASSESSMENT — ENCOUNTER SYMPTOMS
UNEXPECTED WEIGHT CHANGE: 1
TROUBLE SWALLOWING: 1
ABDOMINAL PAIN: 0
CONSTIPATION: 1
APPETITE CHANGE: 1
UNEXPECTED WEIGHT CHANGE: 0
NAUSEA: 0
ABDOMINAL DISTENTION: 0
FATIGUE: 1
CHILLS: 0
SHORTNESS OF BREATH: 0
ADENOPATHY: 0
DIZZINESS: 0
FEVER: 0
ACTIVITY CHANGE: 1
VOMITING: 0
BLOOD IN STOOL: 0
ANAL BLEEDING: 1
CONSTIPATION: 0
ABDOMINAL PAIN: 1
NEUROLOGICAL NEGATIVE: 1
APPETITE CHANGE: 0
COUGH: 0
RECTAL PAIN: 1
DIAPHORESIS: 0
DIARRHEA: 0
PSYCHIATRIC NEGATIVE: 1
RESPIRATORY NEGATIVE: 1
BLOOD IN STOOL: 1

## 2024-08-03 VITALS
HEIGHT: 69 IN | WEIGHT: 108.03 LBS | OXYGEN SATURATION: 97 % | RESPIRATION RATE: 16 BRPM | DIASTOLIC BLOOD PRESSURE: 76 MMHG | TEMPERATURE: 98.2 F | SYSTOLIC BLOOD PRESSURE: 115 MMHG | HEART RATE: 82 BPM | BODY MASS INDEX: 16 KG/M2

## 2024-08-03 LAB
ANION GAP SERPL CALC-SCNC: 9 MMOL/L (ref 7–19)
BASOPHILS # BLD: 0 K/MCL (ref 0–0.3)
BASOPHILS NFR BLD: 0 %
BUN SERPL-MCNC: 12 MG/DL (ref 6–20)
BUN/CREAT SERPL: 21 (ref 7–25)
CALCIUM SERPL-MCNC: 8.5 MG/DL (ref 8.4–10.2)
CHLORIDE SERPL-SCNC: 107 MMOL/L (ref 97–110)
CO2 SERPL-SCNC: 25 MMOL/L (ref 21–32)
CREAT SERPL-MCNC: 0.58 MG/DL (ref 0.67–1.17)
DEPRECATED RDW RBC: 58.7 FL (ref 39–50)
EGFRCR SERPLBLD CKD-EPI 2021: >90 ML/MIN/{1.73_M2}
EOSINOPHIL # BLD: 0.2 K/MCL (ref 0–0.5)
EOSINOPHIL NFR BLD: 1 %
ERYTHROCYTE [DISTWIDTH] IN BLOOD: 20.1 % (ref 11–15)
FASTING DURATION TIME PATIENT: ABNORMAL H
GLUCOSE SERPL-MCNC: 102 MG/DL (ref 70–99)
HCT VFR BLD CALC: 25.5 % (ref 39–51)
HGB BLD-MCNC: 7.4 G/DL (ref 13–17)
IMM GRANULOCYTES # BLD AUTO: 0.1 K/MCL (ref 0–0.2)
IMM GRANULOCYTES # BLD: 1 %
LYMPHOCYTES # BLD: 2.3 K/MCL (ref 1–4.8)
LYMPHOCYTES NFR BLD: 11 %
MCH RBC QN AUTO: 23.4 PG (ref 26–34)
MCHC RBC AUTO-ENTMCNC: 29 G/DL (ref 32–36.5)
MCV RBC AUTO: 80.7 FL (ref 78–100)
MONOCYTES # BLD: 1.2 K/MCL (ref 0.3–0.9)
MONOCYTES NFR BLD: 6 %
NEUTROPHILS # BLD: 16.4 K/MCL (ref 1.8–7.7)
NEUTROPHILS NFR BLD: 81 %
NRBC BLD MANUAL-RTO: 0 /100 WBC
PLATELET # BLD AUTO: 629 K/MCL (ref 140–450)
POTASSIUM SERPL-SCNC: 4 MMOL/L (ref 3.4–5.1)
RBC # BLD: 3.16 MIL/MCL (ref 4.5–5.9)
SODIUM SERPL-SCNC: 137 MMOL/L (ref 135–145)
WBC # BLD: 20.2 K/MCL (ref 4.2–11)

## 2024-08-03 PROCEDURE — 10000002 HB ROOM CHARGE MED SURG

## 2024-08-03 PROCEDURE — 10002803 HB RX 637

## 2024-08-03 PROCEDURE — 85025 COMPLETE CBC W/AUTO DIFF WBC: CPT

## 2024-08-03 PROCEDURE — 80048 BASIC METABOLIC PNL TOTAL CA: CPT

## 2024-08-03 PROCEDURE — 36415 COLL VENOUS BLD VENIPUNCTURE: CPT

## 2024-08-03 PROCEDURE — 10002803 HB RX 637: Performed by: INTERNAL MEDICINE

## 2024-08-03 RX ORDER — LACTULOSE 10 G/15ML
30 SOLUTION ORAL ONCE
Status: COMPLETED | OUTPATIENT
Start: 2024-08-03 | End: 2024-08-03

## 2024-08-03 RX ADMIN — ACETAMINOPHEN 325 MG: 650 SOLUTION ORAL at 15:24

## 2024-08-03 RX ADMIN — ACETAMINOPHEN 325 MG: 650 SOLUTION ORAL at 06:49

## 2024-08-03 RX ADMIN — LACTULOSE 30 G: 10 SOLUTION ORAL at 15:20

## 2024-08-03 ASSESSMENT — ENCOUNTER SYMPTOMS
FATIGUE: 1
RESPIRATORY NEGATIVE: 1
WEAKNESS: 1
UNEXPECTED WEIGHT CHANGE: 1
BACK PAIN: 1
CHILLS: 0
FEVER: 0
RECTAL PAIN: 1
CONSTIPATION: 1
DIAPHORESIS: 0
PSYCHIATRIC NEGATIVE: 1
ACTIVITY CHANGE: 1
APPETITE CHANGE: 1
ANAL BLEEDING: 1

## 2024-08-03 ASSESSMENT — PAIN SCALES - GENERAL
PAINLEVEL_OUTOF10: 8
PAINLEVEL_OUTOF10: 4
PAINLEVEL_OUTOF10: 8
PAINLEVEL_OUTOF10: 5

## 2024-08-04 VITALS
TEMPERATURE: 98.2 F | SYSTOLIC BLOOD PRESSURE: 115 MMHG | WEIGHT: 108.03 LBS | HEART RATE: 82 BPM | HEIGHT: 69 IN | RESPIRATION RATE: 16 BRPM | DIASTOLIC BLOOD PRESSURE: 76 MMHG | OXYGEN SATURATION: 97 % | BODY MASS INDEX: 16 KG/M2

## 2024-08-04 LAB
BACTERIA BLD CULT: NORMAL
BACTERIA BLD CULT: NORMAL

## 2024-08-04 PROCEDURE — 10002803 HB RX 637

## 2024-08-04 RX ADMIN — ACETAMINOPHEN 325 MG: 650 SOLUTION ORAL at 13:31

## 2024-08-04 ASSESSMENT — ENCOUNTER SYMPTOMS
ABDOMINAL PAIN: 1
BLOOD IN STOOL: 1
UNEXPECTED WEIGHT CHANGE: 1
FATIGUE: 1
APPETITE CHANGE: 1
COUGH: 0
ANAL BLEEDING: 1
DIZZINESS: 0
ADENOPATHY: 0
CONSTIPATION: 1

## 2024-08-04 ASSESSMENT — PAIN SCALES - GENERAL: PAINLEVEL_OUTOF10: 4

## 2024-08-06 LAB
ASR DISCLAIMER: NORMAL
CASE RPRT: NORMAL
CLINICAL INFO: NORMAL
PATH REPORT.FINAL DX SPEC: NORMAL
PATH REPORT.GROSS SPEC: NORMAL

## 2024-08-07 LAB
BACTERIA BLD CULT: NORMAL
BACTERIA BLD CULT: NORMAL

## 2024-08-12 ENCOUNTER — OFFICE VISIT (OUTPATIENT)
Dept: SURGERY | Facility: CLINIC | Age: 44
End: 2024-08-12

## 2024-08-12 VITALS — BODY MASS INDEX: 15.55 KG/M2 | WEIGHT: 105 LBS | HEIGHT: 69 IN

## 2024-08-12 DIAGNOSIS — C20 RECTAL CANCER (HCC): Primary | ICD-10-CM

## 2024-08-12 PROCEDURE — 99205 OFFICE O/P NEW HI 60 MIN: CPT | Performed by: SURGERY

## 2024-08-12 NOTE — H&P
History and Physical      HPI     Chief Complaint   Patient presents with    Cancer     Pt referred by Dr. Lawanda Powell regarding anal cancer.  Pt c/o  pain.        HPI  Joseph Angel is a 44 year old male who presents with advanced anal cancer.  He is seen several physicians at multiple institutions.  He is a  by [employer].  He is refused chemo radiation and palliative care.  I discussed there is no indication for primary surgical management.  He is best treated with chemo and radiation.  This will be followed by most likely abdominal perineal resection.  He is not interested in a colostomy.    No past medical history on file.  No past surgical history on file.  No current outpatient medications on file.     ALLERGIES  No Known Allergies    Social History     Socioeconomic History    Marital status: Single   Tobacco Use    Smoking status: Never    Smokeless tobacco: Never   Substance and Sexual Activity    Alcohol use: Never     No family history on file.    Review of Systems   A comprehensive 10 point review of systems was completed.  Pertinent positives and negatives noted in the the HPI.    PHYSICAL EXAM   Ht 5' 9\" (1.753 m)   Wt 105 lb (47.6 kg)   BMI 15.51 kg/m²  No LMP for male patient.   Constitutional: appears well hydrated alert and responsive no acute distress noted  Head/Face: normocephalic  Nose/Mouth/Throat: nose and throat are clear palate is intact mucous membranes are moist no oral lesions are noted  Neck/Thyroid: neck is supple without adenopathy  Respiratory: normal to inspection lungs are clear to auscultation bilaterally normal respiratory effort  Cardiovascular: regular rate and rhythm no murmurs, gallups, or rubs  Abdomen: soft non-tender non-distended no organomegaly noted no masses  Extremities: no edema, cyanosis, or clubbing  Neurological: exam appropriate for age reflexes and motor skills appropriate for age    Anal exam-fungating anal rectal cancer measuring at least 10 cm.   Foul-smelling with necrosis  ASSESSMENT/PLAN   Assessment   Encounter Diagnosis   Name Primary?    Rectal cancer (HCC) Yes       44 year old male with advanced anal cancer  We have discussed the surgical risks, benefits, alternatives, and expected recovery. We will plan chemoradiation followed by probable APR.  Patient is not interested at this point.  We discussed palliative care he is not interested in that we will give it some more thought.. All of the patient's questions have been answered to his satisfaction.       8/12/2024  Buzz Beverly MD

## 2024-08-13 ENCOUNTER — TELEPHONE (OUTPATIENT)
Dept: CARE COORDINATION | Age: 44
End: 2024-08-13

## 2024-08-14 ENCOUNTER — TELEPHONE (OUTPATIENT)
Dept: CARE COORDINATION | Age: 44
End: 2024-08-14

## 2024-08-20 ENCOUNTER — TELEPHONE (OUTPATIENT)
Dept: CARE COORDINATION | Age: 44
End: 2024-08-20

## 2024-08-21 ENCOUNTER — TELEPHONE (OUTPATIENT)
Dept: CARE COORDINATION | Age: 44
End: 2024-08-21

## 2024-08-27 ENCOUNTER — TELEPHONE (OUTPATIENT)
Dept: CARE COORDINATION | Age: 44
End: 2024-08-27

## 2024-09-03 ENCOUNTER — TELEPHONE (OUTPATIENT)
Dept: CARE COORDINATION | Age: 44
End: 2024-09-03

## 2024-10-28 ENCOUNTER — HOSPITAL ENCOUNTER (EMERGENCY)
Facility: HOSPITAL | Age: 44
Discharge: HOME | End: 2024-10-28
Attending: STUDENT IN AN ORGANIZED HEALTH CARE EDUCATION/TRAINING PROGRAM
Payer: MEDICAID

## 2024-10-28 VITALS
SYSTOLIC BLOOD PRESSURE: 103 MMHG | OXYGEN SATURATION: 99 % | DIASTOLIC BLOOD PRESSURE: 61 MMHG | TEMPERATURE: 97 F | RESPIRATION RATE: 21 BRPM | HEART RATE: 82 BPM

## 2024-10-28 DIAGNOSIS — K62.5 ANAL BLEEDING: Primary | ICD-10-CM

## 2024-10-28 DIAGNOSIS — C21.0 ANAL CANCER (HCC): ICD-10-CM

## 2024-10-28 LAB
ANION GAP SERPL CALC-SCNC: 7 MMOL/L (ref 0–18)
ANTIBODY SCREEN: NEGATIVE
APTT PPP: 28.8 SECONDS (ref 23–36)
BASOPHILS # BLD AUTO: 0.04 X10(3) UL (ref 0–0.2)
BASOPHILS NFR BLD AUTO: 0.2 %
BUN BLD-MCNC: 9 MG/DL (ref 9–23)
BUN/CREAT SERPL: 14.5 (ref 10–20)
CALCIUM BLD-MCNC: 9.1 MG/DL (ref 8.7–10.4)
CHLORIDE SERPL-SCNC: 103 MMOL/L (ref 98–112)
CO2 SERPL-SCNC: 24 MMOL/L (ref 21–32)
CREAT BLD-MCNC: 0.62 MG/DL
DEPRECATED RDW RBC AUTO: 61.4 FL (ref 35.1–46.3)
EGFRCR SERPLBLD CKD-EPI 2021: 121 ML/MIN/1.73M2 (ref 60–?)
EOSINOPHIL # BLD AUTO: 0.03 X10(3) UL (ref 0–0.7)
EOSINOPHIL NFR BLD AUTO: 0.2 %
ERYTHROCYTE [DISTWIDTH] IN BLOOD BY AUTOMATED COUNT: 19.9 % (ref 11–15)
GLUCOSE BLD-MCNC: 106 MG/DL (ref 70–99)
HCT VFR BLD AUTO: 22 %
HGB BLD-MCNC: 6.2 G/DL
IMM GRANULOCYTES # BLD AUTO: 0.1 X10(3) UL (ref 0–1)
IMM GRANULOCYTES NFR BLD: 0.6 %
INR BLD: 0.95 (ref 0.8–1.2)
LYMPHOCYTES # BLD AUTO: 1.56 X10(3) UL (ref 1–4)
LYMPHOCYTES NFR BLD AUTO: 9.2 %
MCH RBC QN AUTO: 23.8 PG (ref 26–34)
MCHC RBC AUTO-ENTMCNC: 28.2 G/DL (ref 31–37)
MCV RBC AUTO: 84.3 FL
MONOCYTES # BLD AUTO: 1.36 X10(3) UL (ref 0.1–1)
MONOCYTES NFR BLD AUTO: 8 %
NEUTROPHILS # BLD AUTO: 13.91 X10 (3) UL (ref 1.5–7.7)
NEUTROPHILS # BLD AUTO: 13.91 X10(3) UL (ref 1.5–7.7)
NEUTROPHILS NFR BLD AUTO: 81.8 %
OSMOLALITY SERPL CALC.SUM OF ELEC: 277 MOSM/KG (ref 275–295)
PLATELET # BLD AUTO: 560 10(3)UL (ref 150–450)
POTASSIUM SERPL-SCNC: 3.8 MMOL/L (ref 3.5–5.1)
PROTHROMBIN TIME: 13.3 SECONDS (ref 11.6–14.8)
RBC # BLD AUTO: 2.61 X10(6)UL
RH BLOOD TYPE: NEGATIVE
RH BLOOD TYPE: NEGATIVE
SODIUM SERPL-SCNC: 134 MMOL/L (ref 136–145)
WBC # BLD AUTO: 17 X10(3) UL (ref 4–11)

## 2024-10-28 PROCEDURE — 99285 EMERGENCY DEPT VISIT HI MDM: CPT

## 2024-10-28 PROCEDURE — 85025 COMPLETE CBC W/AUTO DIFF WBC: CPT | Performed by: STUDENT IN AN ORGANIZED HEALTH CARE EDUCATION/TRAINING PROGRAM

## 2024-10-28 PROCEDURE — 85730 THROMBOPLASTIN TIME PARTIAL: CPT | Performed by: STUDENT IN AN ORGANIZED HEALTH CARE EDUCATION/TRAINING PROGRAM

## 2024-10-28 PROCEDURE — 36430 TRANSFUSION BLD/BLD COMPNT: CPT

## 2024-10-28 PROCEDURE — 85610 PROTHROMBIN TIME: CPT | Performed by: STUDENT IN AN ORGANIZED HEALTH CARE EDUCATION/TRAINING PROGRAM

## 2024-10-28 PROCEDURE — 86850 RBC ANTIBODY SCREEN: CPT | Performed by: STUDENT IN AN ORGANIZED HEALTH CARE EDUCATION/TRAINING PROGRAM

## 2024-10-28 PROCEDURE — 36415 COLL VENOUS BLD VENIPUNCTURE: CPT

## 2024-10-28 PROCEDURE — 86901 BLOOD TYPING SEROLOGIC RH(D): CPT | Performed by: STUDENT IN AN ORGANIZED HEALTH CARE EDUCATION/TRAINING PROGRAM

## 2024-10-28 PROCEDURE — 86920 COMPATIBILITY TEST SPIN: CPT

## 2024-10-28 PROCEDURE — 80048 BASIC METABOLIC PNL TOTAL CA: CPT | Performed by: STUDENT IN AN ORGANIZED HEALTH CARE EDUCATION/TRAINING PROGRAM

## 2024-10-28 PROCEDURE — 86900 BLOOD TYPING SEROLOGIC ABO: CPT | Performed by: STUDENT IN AN ORGANIZED HEALTH CARE EDUCATION/TRAINING PROGRAM

## 2024-10-28 PROCEDURE — 85060 BLOOD SMEAR INTERPRETATION: CPT | Performed by: STUDENT IN AN ORGANIZED HEALTH CARE EDUCATION/TRAINING PROGRAM

## 2024-10-28 RX ORDER — ACETAMINOPHEN 160 MG/5ML
15 SOLUTION ORAL ONCE
Status: COMPLETED | OUTPATIENT
Start: 2024-10-28 | End: 2024-10-28

## 2024-10-28 NOTE — ED QUICK NOTES
Pt ambulatory to washroom with walker after infusion of one unit of PRBC's. States he's \"feeling better and rested\"   Awaiting second unit of blood, pt updated with plan of care. Verbalized understanding. All questions/concerns addressed at this time.

## 2024-10-28 NOTE — ED INITIAL ASSESSMENT (HPI)
Patient presents to ED with c/o rectal bleeding. 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 Patient reports having symptoms of low hgb. Patient reports being on palliative care at [facility].

## 2024-10-28 NOTE — ED PROVIDER NOTES
Patient Seen in: 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  He states that last night he picked a piece of the fungating mass while wiping after having a bowel movement and it started bleeding.  He stated he bled quite heavily but eventually was able to stop it with direct pressure.  Now feeling more short of breath with lightheadedness and feels like previously when he has been anemic.  He has declined chemo and radiation in the past.  He states he is on palliative care.  He had been evaluated by a surgeon in the past.     Objective:     History reviewed. No pertinent past medical history.           History reviewed. No pertinent surgical history.             Social History     Socioeconomic History    Marital status: Single   Tobacco Use    Smoking status: Never    Smokeless tobacco: Never   Substance and Sexual Activity    Alcohol use: Never     Social Drivers of Health     Financial Resource Strain: Low Risk  (8/1/2024)    Received from [facility]    Financial Resource Strain     In the past year, have you or any family members you live with been unable to get any of the following when it was really needed? Check all that apply.: None   Food Insecurity: Not on File (9/26/2024)    Received from [facility]    Food Insecurity     Food: 0   Transportation Needs: Not At Risk (8/1/2024)    Received from [facility]    Transportation Needs     In the past 12 months, has lack of reliable transportation kept you from medical appointments, meetings, work or from getting things needed for daily living? : No   Physical Activity: Not on File (4/1/2024)    Received from [facility]    Physical Activity     Physical Activity: 0   Stress: Not on File (4/1/2024)     Received from [facility]    Stress     Stress: 0   Social Connections: Not on File (2024)    Received from [facility]    [email or web address]     Connectedness: 0   Housing Stability: Not on File (2024)    Received from [facility]    Housing Stability     Housin                  Physical Exam     ED Triage Vitals [10/28/24 1220]   BP 98/71   Pulse 112   Resp 18   Temp 98 °F (36.7 °C)   Temp src Temporal   SpO2 100 %   O2 Device None (Room air)       Current Vitals:   Vital Signs  BP: 103/67  Pulse: 78  Resp: 17  Temp: 97 °F (36.1 °C)  Temp src: Oral  MAP (mmHg): 77    Oxygen Therapy  SpO2: 100 %  O2 Device: None (Room air)        Physical Exam  Constitutional: awake, alert, no sig distress  HENT: mmm, no lesions,  Neck: normal range of motion, no tenderness, supple.  Eyes: PERRL, EOMI, conjunctiva normal, no discharge. Sclera anicteric.  Cardiovascular: rr no murmur  Respiratory: Normal breath sounds, no respiratory distress, no wheezing, no chest tenderness.  GI: Bowel sounds normal, Soft, no tenderness, no masses, no pulsatile masses.  -Fungating anal mass no active bleeding with areas of necrosis  : No CVA tenderness.  Skin: Warm, dry, no erythema, no rash.  Musculoskeletal: Intact distal pulses, no edema, no tenderness, no cyanosis, no clubbing. Good range of motion in all major joints. No tenderness to palpation or major deformities noted. Back- No tenderness.  Neurologic: Alert & oriented x 3, normal motor function, normal sensory function, no focal deficits noted.  Psych: Calm, cooperative, nl affect        ED Course     Labs Reviewed   BASIC METABOLIC PANEL (8) - Abnormal; Notable for the following components:       Result Value    Glucose 106 (*)     Sodium 134 (*)     Creatinine 0.62 (*)     All other components within normal limits   CBC WITH DIFFERENTIAL WITH PLATELET - Abnormal; Notable for the following components:    WBC 17.0 (*)     RBC 2.61 (*)     HGB 6.2 (*)     HCT 22.0 (*)     MCH 23.8 (*)      MCHC 28.2 (*)     RDW-SD 61.4 (*)     RDW 19.9 (*)     .0 (*)     Neutrophil Absolute Prelim 13.91 (*)     Neutrophil Absolute 13.91 (*)     Monocyte Absolute 1.36 (*)     All other components within normal limits   PROTHROMBIN TIME (PT) - Normal   PTT, ACTIVATED - Normal   MD BLOOD SMEAR CONSULT   TYPE AND SCREEN    Narrative:     The following orders were created for panel order Type and screen.  Procedure                               Abnormality         Status                     ---------                               -----------         ------                     ABORH (Blood Type)[[id number]]                               Final result               Antibody Screen[[id number]]                                  Final result                 Please view results for these tests on the individual orders.   ABORH (BLOOD TYPE)   ANTIBODY SCREEN   PREPARE RBC   ABORH CONFIRMATION       ED Course as of 10/28/24 1815  ------------------------------------------------------------  Time: 10/28 1734  Comment: MRI PELVIS 8/2024  Large lesion extending inferiorly from the perineum measures over an   area of 7.1 x 4.4 cm, with some variable enhancement. Findings likely   relate to the known mass, however a degree of superimposed rectal prolapse   is difficult to exclude in the background. PET scan may be considered to   assess extent of uptake.   2. Portions of the lesion are difficult to differentiate from the posterior   aspect of the prostate gland. There is also diffuse ill-defined stranding   and enhancement in the perirectal fat.   3. There may be some smaller peripherally enhancing collections in the   pelvic sidewalls bilaterally, measuring up to 2.2 x 0.8 cm in size on the   right. This could relate to superimposed abscesses, though too small to   drain.   4. No metastatic disease to the soft tissues of the abdomen within the   confines of a limited exam.   5. Moderate to heavy colonic stool load. No dilated bowel  loops to indicate   mechanical obstruction are seen.   6. Additional background chronic appearing findings as described above.                 MDM      44-year-old male with history as documented above presenting for evaluation of anal bleeding.  On arrival vitals are stable and reassuring  On exam there is no active bleeding, patient is hemodynamically stable.  Suspect he has symptomatic anemia.  Will transfuse 2 units.  Hb 6.2.  -Patient denies other interventions and declines admission    Return precautions and follow-up instructions were discussed with patient who voiced understanding and agreement the plan.  All questions were answered to patient satisfaction.  Medical Decision Making      Disposition and Plan     Clinical Impression:  1. Anal bleeding    2. Anal cancer (HCC)         Disposition:  Discharge  10/28/2024  6:15 pm    Follow-up:  Nonstaff, Physician    Follow up in 2 day(s)      [facility] Emergency Department  [address] [facility] [id number] [phone]  Follow up  As needed, If symptoms worsen          Medications Prescribed:  Current Discharge Medication List              Supplementary Documentation:

## 2024-10-30 LAB
BLOOD TYPE BARCODE: 9500
UNIT VOLUME: 350 ML

## 2025-01-01 ENCOUNTER — HOSPITAL ENCOUNTER (INPATIENT)
Facility: HOSPITAL | Age: 45
LOS: 3 days | End: 2025-01-01
Attending: INTERNAL MEDICINE | Admitting: INTERNAL MEDICINE
Payer: OTHER MISCELLANEOUS

## 2025-01-01 ENCOUNTER — APPOINTMENT (OUTPATIENT)
Dept: CT IMAGING | Facility: HOSPITAL | Age: 45
End: 2025-01-01
Payer: MEDICAID

## 2025-01-01 ENCOUNTER — HOSPITAL ENCOUNTER (INPATIENT)
Facility: HOSPITAL | Age: 45
LOS: 8 days | Discharge: HOSPICE | End: 2025-01-01
Attending: EMERGENCY MEDICINE | Admitting: INTERNAL MEDICINE
Payer: MEDICAID

## 2025-01-01 VITALS
SYSTOLIC BLOOD PRESSURE: 63 MMHG | OXYGEN SATURATION: 92 % | RESPIRATION RATE: 12 BRPM | HEIGHT: 69.02 IN | TEMPERATURE: 101 F | HEART RATE: 142 BPM | WEIGHT: 87.06 LBS | DIASTOLIC BLOOD PRESSURE: 37 MMHG | BODY MASS INDEX: 12.89 KG/M2

## 2025-01-01 VITALS
WEIGHT: 87 LBS | DIASTOLIC BLOOD PRESSURE: 60 MMHG | SYSTOLIC BLOOD PRESSURE: 99 MMHG | HEART RATE: 104 BPM | TEMPERATURE: 98 F | BODY MASS INDEX: 12.88 KG/M2 | RESPIRATION RATE: 14 BRPM | HEIGHT: 69 IN | OXYGEN SATURATION: 100 %

## 2025-01-01 DIAGNOSIS — E87.1 HYPONATREMIA: ICD-10-CM

## 2025-01-01 DIAGNOSIS — C21.0 SQUAMOUS CELL CANCER, ANUS (HCC): Primary | ICD-10-CM

## 2025-01-01 DIAGNOSIS — K62.5 RECTAL BLEEDING: ICD-10-CM

## 2025-01-01 DIAGNOSIS — G89.3 CANCER ASSOCIATED PAIN: ICD-10-CM

## 2025-01-01 LAB
ALBUMIN SERPL-MCNC: 2.2 G/DL (ref 3.2–4.8)
ALBUMIN/GLOB SERPL: 0.9 {RATIO} (ref 1–2)
ALP LIVER SERPL-CCNC: 102 U/L (ref 45–117)
ALT SERPL-CCNC: 7 U/L (ref 10–49)
ANION GAP SERPL CALC-SCNC: 6 MMOL/L (ref 0–18)
ANION GAP SERPL CALC-SCNC: 6 MMOL/L (ref 0–18)
ANION GAP SERPL CALC-SCNC: 7 MMOL/L (ref 0–18)
ANION GAP SERPL CALC-SCNC: 9 MMOL/L (ref 0–18)
ANTIBODY SCREEN: NEGATIVE
AST SERPL-CCNC: 13 U/L (ref ?–34)
BASOPHILS # BLD AUTO: 0.02 X10(3) UL (ref 0–0.2)
BASOPHILS # BLD AUTO: 0.04 X10(3) UL (ref 0–0.2)
BASOPHILS # BLD AUTO: 0.04 X10(3) UL (ref 0–0.2)
BASOPHILS # BLD AUTO: 0.06 X10(3) UL (ref 0–0.2)
BASOPHILS NFR BLD AUTO: 0.1 %
BASOPHILS NFR BLD AUTO: 0.2 %
BILIRUB SERPL-MCNC: 0.5 MG/DL (ref 0.3–1.2)
BILIRUB UR QL: NEGATIVE
BLOOD TYPE BARCODE: 600
BLOOD TYPE BARCODE: 600
BUN BLD-MCNC: 11 MG/DL (ref 9–23)
BUN BLD-MCNC: 17 MG/DL (ref 9–23)
BUN BLD-MCNC: 8 MG/DL (ref 9–23)
BUN BLD-MCNC: 9 MG/DL (ref 9–23)
BUN/CREAT SERPL: 14.3 (ref 10–20)
BUN/CREAT SERPL: 15.4 (ref 10–20)
BUN/CREAT SERPL: 16.7 (ref 10–20)
BUN/CREAT SERPL: 20 (ref 10–20)
CALCIUM BLD-MCNC: 11.9 MG/DL (ref 8.7–10.4)
CALCIUM BLD-MCNC: 8.5 MG/DL (ref 8.7–10.4)
CALCIUM BLD-MCNC: 9.2 MG/DL (ref 8.7–10.4)
CALCIUM BLD-MCNC: 9.2 MG/DL (ref 8.7–10.4)
CHLORIDE SERPL-SCNC: 100 MMOL/L (ref 98–112)
CHLORIDE SERPL-SCNC: 104 MMOL/L (ref 98–112)
CHLORIDE SERPL-SCNC: 105 MMOL/L (ref 98–112)
CHLORIDE SERPL-SCNC: 108 MMOL/L (ref 98–112)
CLARITY UR: CLEAR
CO2 SERPL-SCNC: 21 MMOL/L (ref 21–32)
CO2 SERPL-SCNC: 24 MMOL/L (ref 21–32)
CREAT BLD-MCNC: 0.52 MG/DL (ref 0.7–1.3)
CREAT BLD-MCNC: 0.63 MG/DL (ref 0.7–1.3)
CREAT BLD-MCNC: 0.66 MG/DL (ref 0.7–1.3)
CREAT BLD-MCNC: 0.85 MG/DL (ref 0.7–1.3)
DEPRECATED RDW RBC AUTO: 51.3 FL (ref 35.1–46.3)
DEPRECATED RDW RBC AUTO: 53.1 FL (ref 35.1–46.3)
DEPRECATED RDW RBC AUTO: 53.1 FL (ref 35.1–46.3)
DEPRECATED RDW RBC AUTO: 55.2 FL (ref 35.1–46.3)
EGFRCR SERPLBLD CKD-EPI 2021: 109 ML/MIN/1.73M2 (ref 60–?)
EGFRCR SERPLBLD CKD-EPI 2021: 118 ML/MIN/1.73M2 (ref 60–?)
EGFRCR SERPLBLD CKD-EPI 2021: 120 ML/MIN/1.73M2 (ref 60–?)
EGFRCR SERPLBLD CKD-EPI 2021: 127 ML/MIN/1.73M2 (ref 60–?)
EOSINOPHIL # BLD AUTO: 0.02 X10(3) UL (ref 0–0.7)
EOSINOPHIL # BLD AUTO: 0.03 X10(3) UL (ref 0–0.7)
EOSINOPHIL # BLD AUTO: 0.06 X10(3) UL (ref 0–0.7)
EOSINOPHIL # BLD AUTO: 0.06 X10(3) UL (ref 0–0.7)
EOSINOPHIL NFR BLD AUTO: 0.1 %
EOSINOPHIL NFR BLD AUTO: 0.1 %
EOSINOPHIL NFR BLD AUTO: 0.2 %
EOSINOPHIL NFR BLD AUTO: 0.3 %
ERYTHROCYTE [DISTWIDTH] IN BLOOD BY AUTOMATED COUNT: 17.2 % (ref 11–15)
ERYTHROCYTE [DISTWIDTH] IN BLOOD BY AUTOMATED COUNT: 17.5 % (ref 11–15)
ERYTHROCYTE [DISTWIDTH] IN BLOOD BY AUTOMATED COUNT: 17.7 % (ref 11–15)
ERYTHROCYTE [DISTWIDTH] IN BLOOD BY AUTOMATED COUNT: 18.3 % (ref 11–15)
GLOBULIN PLAS-MCNC: 2.5 G/DL (ref 2–3.5)
GLUCOSE BLD-MCNC: 100 MG/DL (ref 70–99)
GLUCOSE BLD-MCNC: 105 MG/DL (ref 70–99)
GLUCOSE BLD-MCNC: 148 MG/DL (ref 70–99)
GLUCOSE BLD-MCNC: 94 MG/DL (ref 70–99)
GLUCOSE UR-MCNC: NORMAL MG/DL
HCT VFR BLD AUTO: 20.8 % (ref 39–53)
HCT VFR BLD AUTO: 22.4 % (ref 39–53)
HCT VFR BLD AUTO: 23.3 % (ref 39–53)
HCT VFR BLD AUTO: 27 % (ref 39–53)
HCT VFR BLD AUTO: 27.5 % (ref 39–53)
HCT VFR BLD AUTO: 29.4 % (ref 39–53)
HGB BLD-MCNC: 6 G/DL (ref 13–17.5)
HGB BLD-MCNC: 6.9 G/DL (ref 13–17.5)
HGB BLD-MCNC: 7.3 G/DL (ref 13–17.5)
HGB BLD-MCNC: 8 G/DL (ref 13–17.5)
HGB BLD-MCNC: 8.3 G/DL (ref 13–17.5)
HGB BLD-MCNC: 8.9 G/DL (ref 13–17.5)
HGB UR QL STRIP.AUTO: NEGATIVE
IMM GRANULOCYTES # BLD AUTO: 0.13 X10(3) UL (ref 0–1)
IMM GRANULOCYTES # BLD AUTO: 0.16 X10(3) UL (ref 0–1)
IMM GRANULOCYTES # BLD AUTO: 0.17 X10(3) UL (ref 0–1)
IMM GRANULOCYTES # BLD AUTO: 0.28 X10(3) UL (ref 0–1)
IMM GRANULOCYTES NFR BLD: 0.6 %
IMM GRANULOCYTES NFR BLD: 0.6 %
IMM GRANULOCYTES NFR BLD: 0.7 %
IMM GRANULOCYTES NFR BLD: 0.8 %
KETONES UR-MCNC: NEGATIVE MG/DL
LEUKOCYTE ESTERASE UR QL STRIP.AUTO: 75
LYMPHOCYTES # BLD AUTO: 0.99 X10(3) UL (ref 1–4)
LYMPHOCYTES # BLD AUTO: 1.38 X10(3) UL (ref 1–4)
LYMPHOCYTES # BLD AUTO: 1.39 X10(3) UL (ref 1–4)
LYMPHOCYTES # BLD AUTO: 2.66 X10(3) UL (ref 1–4)
LYMPHOCYTES NFR BLD AUTO: 4.8 %
LYMPHOCYTES NFR BLD AUTO: 5.3 %
LYMPHOCYTES NFR BLD AUTO: 5.4 %
LYMPHOCYTES NFR BLD AUTO: 7.7 %
MCH RBC QN AUTO: 23.9 PG (ref 26–34)
MCH RBC QN AUTO: 24.3 PG (ref 26–34)
MCH RBC QN AUTO: 24.8 PG (ref 26–34)
MCH RBC QN AUTO: 25.5 PG (ref 26–34)
MCHC RBC AUTO-ENTMCNC: 28.8 G/DL (ref 31–37)
MCHC RBC AUTO-ENTMCNC: 29.1 G/DL (ref 31–37)
MCHC RBC AUTO-ENTMCNC: 30.3 G/DL (ref 31–37)
MCHC RBC AUTO-ENTMCNC: 30.8 G/DL (ref 31–37)
MCV RBC AUTO: 81.9 FL (ref 80–100)
MCV RBC AUTO: 82.7 FL (ref 80–100)
MCV RBC AUTO: 82.9 FL (ref 80–100)
MCV RBC AUTO: 83.6 FL (ref 80–100)
MONOCYTES # BLD AUTO: 1.03 X10(3) UL (ref 0.1–1)
MONOCYTES # BLD AUTO: 1.6 X10(3) UL (ref 0.1–1)
MONOCYTES # BLD AUTO: 1.61 X10(3) UL (ref 0.1–1)
MONOCYTES # BLD AUTO: 2.07 X10(3) UL (ref 0.1–1)
MONOCYTES NFR BLD AUTO: 5 %
MONOCYTES NFR BLD AUTO: 6 %
MONOCYTES NFR BLD AUTO: 6.1 %
MONOCYTES NFR BLD AUTO: 6.2 %
NEUTROPHILS # BLD AUTO: 18.32 X10 (3) UL (ref 1.5–7.7)
NEUTROPHILS # BLD AUTO: 18.32 X10(3) UL (ref 1.5–7.7)
NEUTROPHILS # BLD AUTO: 22.66 X10 (3) UL (ref 1.5–7.7)
NEUTROPHILS # BLD AUTO: 22.66 X10(3) UL (ref 1.5–7.7)
NEUTROPHILS # BLD AUTO: 22.99 X10 (3) UL (ref 1.5–7.7)
NEUTROPHILS # BLD AUTO: 22.99 X10(3) UL (ref 1.5–7.7)
NEUTROPHILS # BLD AUTO: 29.3 X10 (3) UL (ref 1.5–7.7)
NEUTROPHILS # BLD AUTO: 29.3 X10(3) UL (ref 1.5–7.7)
NEUTROPHILS NFR BLD AUTO: 85.2 %
NEUTROPHILS NFR BLD AUTO: 87.3 %
NEUTROPHILS NFR BLD AUTO: 87.7 %
NEUTROPHILS NFR BLD AUTO: 89.2 %
NITRITE UR QL STRIP.AUTO: NEGATIVE
OSMOLALITY SERPL CALC.SUM OF ELEC: 272 MOSM/KG (ref 275–295)
OSMOLALITY SERPL CALC.SUM OF ELEC: 273 MOSM/KG (ref 275–295)
OSMOLALITY SERPL CALC.SUM OF ELEC: 278 MOSM/KG (ref 275–295)
OSMOLALITY SERPL CALC.SUM OF ELEC: 281 MOSM/KG (ref 275–295)
PH UR: 6 [PH] (ref 5–8)
PLATELET # BLD AUTO: 255 10(3)UL (ref 150–450)
PLATELET # BLD AUTO: 261 10(3)UL (ref 150–450)
PLATELET # BLD AUTO: 294 10(3)UL (ref 150–450)
PLATELET # BLD AUTO: 483 10(3)UL (ref 150–450)
POTASSIUM SERPL-SCNC: 3.1 MMOL/L (ref 3.5–5.1)
POTASSIUM SERPL-SCNC: 3.4 MMOL/L (ref 3.5–5.1)
POTASSIUM SERPL-SCNC: 3.5 MMOL/L (ref 3.5–5.1)
POTASSIUM SERPL-SCNC: 3.5 MMOL/L (ref 3.5–5.1)
POTASSIUM SERPL-SCNC: 3.7 MMOL/L (ref 3.5–5.1)
POTASSIUM SERPL-SCNC: 4.1 MMOL/L (ref 3.5–5.1)
POTASSIUM SERPL-SCNC: 4.4 MMOL/L (ref 3.5–5.1)
PROT SERPL-MCNC: 4.7 G/DL (ref 5.7–8.2)
PROT UR-MCNC: NEGATIVE MG/DL
RBC # BLD AUTO: 2.51 X10(6)UL (ref 4.3–5.7)
RBC # BLD AUTO: 2.71 X10(6)UL (ref 4.3–5.7)
RBC # BLD AUTO: 3.29 X10(6)UL (ref 4.3–5.7)
RBC # BLD AUTO: 3.59 X10(6)UL (ref 4.3–5.7)
RH BLOOD TYPE: NEGATIVE
SODIUM SERPL-SCNC: 130 MMOL/L (ref 136–145)
SODIUM SERPL-SCNC: 132 MMOL/L (ref 136–145)
SODIUM SERPL-SCNC: 135 MMOL/L (ref 136–145)
SODIUM SERPL-SCNC: 135 MMOL/L (ref 136–145)
SP GR UR STRIP: 1.01 (ref 1–1.03)
UNIT VOLUME: 350 ML
UNIT VOLUME: 350 ML
UROBILINOGEN UR STRIP-ACNC: NORMAL
WBC # BLD AUTO: 20.6 X10(3) UL (ref 4–11)
WBC # BLD AUTO: 25.9 X10(3) UL (ref 4–11)
WBC # BLD AUTO: 26.2 X10(3) UL (ref 4–11)
WBC # BLD AUTO: 34.4 X10(3) UL (ref 4–11)

## 2025-01-01 PROCEDURE — 99233 SBSQ HOSP IP/OBS HIGH 50: CPT | Performed by: INTERNAL MEDICINE

## 2025-01-01 PROCEDURE — 99498 ADVNCD CARE PLAN ADDL 30 MIN: CPT | Performed by: INTERNAL MEDICINE

## 2025-01-01 PROCEDURE — G0316 PROLNG IP/OBS E/M EA ADDL 15 MIN: HCPCS | Performed by: INTERNAL MEDICINE

## 2025-01-01 PROCEDURE — 99254 IP/OBS CNSLTJ NEW/EST MOD 60: CPT | Performed by: STUDENT IN AN ORGANIZED HEALTH CARE EDUCATION/TRAINING PROGRAM

## 2025-01-01 PROCEDURE — 99223 1ST HOSP IP/OBS HIGH 75: CPT | Performed by: SURGERY

## 2025-01-01 PROCEDURE — 99497 ADVNCD CARE PLAN 30 MIN: CPT | Performed by: INTERNAL MEDICINE

## 2025-01-01 PROCEDURE — 99223 1ST HOSP IP/OBS HIGH 75: CPT | Performed by: INTERNAL MEDICINE

## 2025-01-01 PROCEDURE — 30233N1 TRANSFUSION OF NONAUTOLOGOUS RED BLOOD CELLS INTO PERIPHERAL VEIN, PERCUTANEOUS APPROACH: ICD-10-PCS | Performed by: INTERNAL MEDICINE

## 2025-01-01 RX ORDER — POTASSIUM CHLORIDE 14.9 MG/ML
20 INJECTION INTRAVENOUS ONCE
Status: COMPLETED | OUTPATIENT
Start: 2025-01-01 | End: 2025-01-01

## 2025-01-01 RX ORDER — ACETAMINOPHEN 10 MG/ML
15 INJECTION, SOLUTION INTRAVENOUS EVERY 8 HOURS PRN
Status: DISCONTINUED | OUTPATIENT
Start: 2025-01-01 | End: 2025-01-01

## 2025-01-01 RX ORDER — HYDROXYZINE HCL 10 MG/5 ML
10 SOLUTION, ORAL ORAL 3 TIMES DAILY
Status: DISCONTINUED | OUTPATIENT
Start: 2025-01-01 | End: 2025-01-01

## 2025-01-01 RX ORDER — DIPHENHYDRAMINE HYDROCHLORIDE 50 MG/ML
12.5 INJECTION, SOLUTION INTRAMUSCULAR; INTRAVENOUS EVERY 4 HOURS PRN
Status: DISCONTINUED | OUTPATIENT
Start: 2025-01-01 | End: 2025-01-01

## 2025-01-01 RX ORDER — HALOPERIDOL 5 MG/ML
2 INJECTION INTRAMUSCULAR
Status: DISCONTINUED | OUTPATIENT
Start: 2025-01-01 | End: 2025-06-12

## 2025-01-01 RX ORDER — HYDROMORPHONE HYDROCHLORIDE 1 MG/ML
1 INJECTION, SOLUTION INTRAMUSCULAR; INTRAVENOUS; SUBCUTANEOUS EVERY 2 HOUR PRN
Refills: 0 | Status: DISCONTINUED | OUTPATIENT
Start: 2025-01-01 | End: 2025-06-12

## 2025-01-01 RX ORDER — SODIUM CHLORIDE 9 MG/ML
INJECTION, SOLUTION INTRAVENOUS ONCE
Status: COMPLETED | OUTPATIENT
Start: 2025-01-01 | End: 2025-01-01

## 2025-01-01 RX ORDER — DIPHENHYDRAMINE HYDROCHLORIDE 50 MG/ML
25 INJECTION, SOLUTION INTRAMUSCULAR; INTRAVENOUS EVERY 4 HOURS PRN
Status: DISCONTINUED | OUTPATIENT
Start: 2025-01-01 | End: 2025-06-12

## 2025-01-01 RX ORDER — POTASSIUM CHLORIDE 14.9 MG/ML
20 INJECTION INTRAVENOUS ONCE
Status: DISCONTINUED | OUTPATIENT
Start: 2025-01-01 | End: 2025-01-01

## 2025-01-01 RX ORDER — MORPHINE SULFATE 2 MG/ML
2 INJECTION, SOLUTION INTRAMUSCULAR; INTRAVENOUS ONCE
Status: COMPLETED | OUTPATIENT
Start: 2025-01-01 | End: 2025-01-01

## 2025-01-01 RX ORDER — SODIUM CHLORIDE 0.9 % (FLUSH) 0.9 %
10 SYRINGE (ML) INJECTION AS NEEDED
Status: DISCONTINUED | OUTPATIENT
Start: 2025-01-01 | End: 2025-06-12

## 2025-01-01 RX ORDER — LORAZEPAM 2 MG/ML
1 INJECTION INTRAMUSCULAR EVERY 4 HOURS PRN
Status: DISCONTINUED | OUTPATIENT
Start: 2025-01-01 | End: 2025-01-01

## 2025-01-01 RX ORDER — MORPHINE SULFATE 4 MG/ML
4 INJECTION, SOLUTION INTRAMUSCULAR; INTRAVENOUS ONCE
Status: COMPLETED | OUTPATIENT
Start: 2025-01-01 | End: 2025-01-01

## 2025-01-01 RX ORDER — ONDANSETRON 2 MG/ML
4 INJECTION INTRAMUSCULAR; INTRAVENOUS EVERY 4 HOURS PRN
Status: DISCONTINUED | OUTPATIENT
Start: 2025-01-01 | End: 2025-01-01

## 2025-01-01 RX ORDER — SCOPOLAMINE 1 MG/3D
1 PATCH, EXTENDED RELEASE TRANSDERMAL
Status: DISCONTINUED | OUTPATIENT
Start: 2025-01-01 | End: 2025-06-12

## 2025-01-01 RX ORDER — MORPHINE SULFATE 10 MG/5ML
12 SOLUTION ORAL EVERY 6 HOURS
Refills: 0 | Status: DISCONTINUED | OUTPATIENT
Start: 2025-01-01 | End: 2025-01-01

## 2025-01-01 RX ORDER — ONDANSETRON 2 MG/ML
4 INJECTION INTRAMUSCULAR; INTRAVENOUS EVERY 6 HOURS PRN
Status: DISCONTINUED | OUTPATIENT
Start: 2025-01-01 | End: 2025-06-12

## 2025-01-01 RX ORDER — FENTANYL 12.5 UG/1
1 PATCH TRANSDERMAL
Refills: 0 | Status: DISCONTINUED | OUTPATIENT
Start: 2025-01-01 | End: 2025-06-12

## 2025-01-01 RX ORDER — ONDANSETRON 8 MG/1
8 TABLET, ORALLY DISINTEGRATING ORAL EVERY 6 HOURS PRN
Qty: 30 TABLET | Refills: 0 | Status: SHIPPED | OUTPATIENT
Start: 2025-01-01 | End: 2025-06-12

## 2025-01-01 RX ORDER — DIPHENHYDRAMINE HCL 25 MG
25 CAPSULE ORAL EVERY 4 HOURS PRN
Status: DISCONTINUED | OUTPATIENT
Start: 2025-01-01 | End: 2025-06-12

## 2025-01-01 RX ORDER — LORAZEPAM 2 MG/ML
1 INJECTION INTRAMUSCULAR EVERY 4 HOURS PRN
Status: DISCONTINUED | OUTPATIENT
Start: 2025-01-01 | End: 2025-06-12

## 2025-01-01 RX ORDER — HYDROMORPHONE HYDROCHLORIDE 1 MG/ML
2 INJECTION, SOLUTION INTRAMUSCULAR; INTRAVENOUS; SUBCUTANEOUS EVERY 2 HOUR PRN
Refills: 0 | Status: DISCONTINUED | OUTPATIENT
Start: 2025-01-01 | End: 2025-06-12

## 2025-01-01 RX ORDER — ACETAMINOPHEN 10 MG/ML
15 INJECTION, SOLUTION INTRAVENOUS EVERY 6 HOURS PRN
Status: DISCONTINUED | OUTPATIENT
Start: 2025-01-01 | End: 2025-01-01

## 2025-01-01 RX ORDER — FUROSEMIDE 10 MG/ML
40 INJECTION INTRAMUSCULAR; INTRAVENOUS EVERY 8 HOURS PRN
Status: DISCONTINUED | OUTPATIENT
Start: 2025-01-01 | End: 2025-06-12

## 2025-01-01 RX ORDER — ACETAMINOPHEN 500 MG
1000 TABLET ORAL EVERY 6 HOURS PRN
Status: DISCONTINUED | OUTPATIENT
Start: 2025-01-01 | End: 2025-01-01

## 2025-01-01 RX ORDER — GLYCOPYRROLATE 0.2 MG/ML
0.4 INJECTION, SOLUTION INTRAMUSCULAR; INTRAVENOUS
Status: DISCONTINUED | OUTPATIENT
Start: 2025-01-01 | End: 2025-06-12

## 2025-01-01 RX ORDER — LACTULOSE 10 G/15ML
10 SOLUTION ORAL 3 TIMES DAILY PRN
Status: DISCONTINUED | OUTPATIENT
Start: 2025-01-01 | End: 2025-01-01

## 2025-01-01 RX ORDER — HALOPERIDOL 5 MG/ML
1 INJECTION INTRAMUSCULAR
Status: DISCONTINUED | OUTPATIENT
Start: 2025-01-01 | End: 2025-06-12

## 2025-01-01 RX ORDER — POTASSIUM CHLORIDE 1.5 G/1.58G
40 POWDER, FOR SOLUTION ORAL EVERY 4 HOURS
Status: DISPENSED | OUTPATIENT
Start: 2025-01-01 | End: 2025-01-01

## 2025-01-01 RX ORDER — LORAZEPAM 2 MG/ML
2 INJECTION INTRAMUSCULAR EVERY 4 HOURS PRN
Status: DISCONTINUED | OUTPATIENT
Start: 2025-01-01 | End: 2025-06-12

## 2025-01-01 RX ORDER — HYDROXYZINE HCL 10 MG/5 ML
10 SOLUTION, ORAL ORAL EVERY 6 HOURS PRN
Status: DISCONTINUED | OUTPATIENT
Start: 2025-01-01 | End: 2025-01-01

## 2025-01-01 RX ORDER — POTASSIUM CHLORIDE 1500 MG/1
40 TABLET, EXTENDED RELEASE ORAL EVERY 4 HOURS
Status: DISCONTINUED | OUTPATIENT
Start: 2025-01-01 | End: 2025-01-01

## 2025-01-01 RX ORDER — ONDANSETRON 4 MG/1
8 TABLET, ORALLY DISINTEGRATING ORAL EVERY 6 HOURS PRN
Status: DISCONTINUED | OUTPATIENT
Start: 2025-01-01 | End: 2025-01-01

## 2025-01-01 RX ORDER — ATROPINE SULFATE 10 MG/ML
2 SOLUTION/ DROPS OPHTHALMIC EVERY 2 HOUR PRN
Status: DISCONTINUED | OUTPATIENT
Start: 2025-01-01 | End: 2025-06-12

## 2025-01-01 RX ORDER — MELATONIN
100 DAILY
Status: DISCONTINUED | OUTPATIENT
Start: 2025-01-01 | End: 2025-01-01

## 2025-01-01 RX ORDER — HYDROMORPHONE HYDROCHLORIDE 1 MG/ML
0.4 INJECTION, SOLUTION INTRAMUSCULAR; INTRAVENOUS; SUBCUTANEOUS EVERY 2 HOUR PRN
Refills: 0 | Status: DISCONTINUED | OUTPATIENT
Start: 2025-01-01 | End: 2025-01-01

## 2025-01-01 RX ORDER — HYDROMORPHONE HYDROCHLORIDE 1 MG/ML
2 SOLUTION ORAL EVERY 2 HOUR PRN
Refills: 0 | Status: DISCONTINUED | OUTPATIENT
Start: 2025-01-01 | End: 2025-06-12

## 2025-01-01 RX ORDER — MORPHINE SULFATE 4 MG/ML
4 INJECTION, SOLUTION INTRAMUSCULAR; INTRAVENOUS EVERY 2 HOUR PRN
Refills: 0 | Status: DISCONTINUED | OUTPATIENT
Start: 2025-01-01 | End: 2025-01-01

## 2025-01-01 RX ORDER — METHADONE HYDROCHLORIDE 10 MG/5ML
2.5 SOLUTION ORAL
Refills: 0 | Status: DISCONTINUED | OUTPATIENT
Start: 2025-01-01 | End: 2025-01-01

## 2025-01-01 RX ORDER — MORPHINE SULFATE 10 MG/5ML
10 SOLUTION ORAL EVERY 6 HOURS
Refills: 0 | Status: DISCONTINUED | OUTPATIENT
Start: 2025-01-01 | End: 2025-01-01

## 2025-01-01 RX ORDER — SODIUM CHLORIDE 9 MG/ML
INJECTION, SOLUTION INTRAVENOUS CONTINUOUS
Status: DISCONTINUED | OUTPATIENT
Start: 2025-01-01 | End: 2025-01-01

## 2025-01-01 RX ORDER — MORPHINE SULFATE 2 MG/ML
4 INJECTION, SOLUTION INTRAMUSCULAR; INTRAVENOUS EVERY 2 HOUR PRN
Refills: 0 | Status: DISCONTINUED | OUTPATIENT
Start: 2025-01-01 | End: 2025-01-01

## 2025-01-01 RX ORDER — ACETAMINOPHEN 10 MG/ML
15 INJECTION, SOLUTION INTRAVENOUS EVERY 6 HOURS PRN
Status: DISCONTINUED | OUTPATIENT
Start: 2025-01-01 | End: 2025-06-12

## 2025-01-01 RX ORDER — HYDROMORPHONE HYDROCHLORIDE 1 MG/ML
0.2 INJECTION, SOLUTION INTRAMUSCULAR; INTRAVENOUS; SUBCUTANEOUS EVERY 2 HOUR PRN
Refills: 0 | Status: DISCONTINUED | OUTPATIENT
Start: 2025-01-01 | End: 2025-01-01

## 2025-01-01 RX ORDER — LACTULOSE 10 G/15ML
10 SOLUTION ORAL 2 TIMES DAILY
Status: DISCONTINUED | OUTPATIENT
Start: 2025-01-01 | End: 2025-01-01

## 2025-01-01 RX ORDER — HYDROXYZINE HCL 10 MG/5 ML
10 SOLUTION, ORAL ORAL EVERY 6 HOURS PRN
Qty: 118 ML | Refills: 0 | Status: SHIPPED | OUTPATIENT
Start: 2025-01-01 | End: 2025-06-12

## 2025-01-01 RX ORDER — LORAZEPAM 2 MG/ML
1 INJECTION INTRAMUSCULAR EVERY 6 HOURS PRN
Status: DISCONTINUED | OUTPATIENT
Start: 2025-01-01 | End: 2025-01-01

## 2025-01-01 RX ORDER — LACTULOSE 10 G/15ML
10 SOLUTION ORAL 3 TIMES DAILY PRN
Qty: 473 ML | Refills: 0 | Status: SHIPPED | OUTPATIENT
Start: 2025-01-01 | End: 2025-06-12

## 2025-01-01 RX ORDER — HYDROMORPHONE HYDROCHLORIDE 1 MG/ML
0.5 INJECTION, SOLUTION INTRAMUSCULAR; INTRAVENOUS; SUBCUTANEOUS EVERY 2 HOUR PRN
Refills: 0 | Status: DISCONTINUED | OUTPATIENT
Start: 2025-01-01 | End: 2025-06-12

## 2025-01-01 RX ORDER — HYDROMORPHONE HYDROCHLORIDE 1 MG/ML
0.8 INJECTION, SOLUTION INTRAMUSCULAR; INTRAVENOUS; SUBCUTANEOUS EVERY 2 HOUR PRN
Refills: 0 | Status: DISCONTINUED | OUTPATIENT
Start: 2025-01-01 | End: 2025-01-01

## 2025-03-05 ENCOUNTER — TELEPHONE (OUTPATIENT)
Age: 45
End: 2025-03-05

## 2025-03-05 NOTE — TELEPHONE ENCOUNTER
Pt is calling to schedule a new consult appt.  Patient Name and - Joseph Angel 1980  New Consult- Dr. Conn  Referred by and phone#- Dr. Anentte Damico   Reason- Anal cancer  Insurance- [facility]   Please give pt a call back. Thank you.    Pt said he spoke to Marleny 25

## 2025-03-07 ENCOUNTER — OFFICE VISIT (OUTPATIENT)
Age: 45
End: 2025-03-07
Attending: STUDENT IN AN ORGANIZED HEALTH CARE EDUCATION/TRAINING PROGRAM
Payer: MEDICAID

## 2025-03-07 VITALS
TEMPERATURE: 98 F | SYSTOLIC BLOOD PRESSURE: 99 MMHG | HEART RATE: 93 BPM | DIASTOLIC BLOOD PRESSURE: 65 MMHG | OXYGEN SATURATION: 99 % | BODY MASS INDEX: 13 KG/M2 | WEIGHT: 90 LBS | RESPIRATION RATE: 16 BRPM

## 2025-03-07 DIAGNOSIS — D64.9 ANEMIA: ICD-10-CM

## 2025-03-07 DIAGNOSIS — C21.0 SQUAMOUS CELL CANCER, ANUS (HCC): Primary | ICD-10-CM

## 2025-03-07 DIAGNOSIS — D50.0 IRON DEFICIENCY ANEMIA DUE TO CHRONIC BLOOD LOSS: ICD-10-CM

## 2025-03-07 LAB
ALBUMIN SERPL-MCNC: 3.2 G/DL (ref 3.2–4.8)
ALBUMIN/GLOB SERPL: 1 {RATIO} (ref 1–2)
ALP LIVER SERPL-CCNC: 74 U/L
ALT SERPL-CCNC: 8 U/L
ANION GAP SERPL CALC-SCNC: 6 MMOL/L (ref 0–18)
ANTIBODY SCREEN: NEGATIVE
AST SERPL-CCNC: <8 U/L (ref ?–34)
BASOPHILS # BLD AUTO: 0.02 X10(3) UL (ref 0–0.2)
BASOPHILS NFR BLD AUTO: 0.1 %
BILIRUB SERPL-MCNC: 0.2 MG/DL (ref 0.3–1.2)
BUN BLD-MCNC: 9 MG/DL (ref 9–23)
BUN/CREAT SERPL: 17.6 (ref 10–20)
CALCIUM BLD-MCNC: 9.4 MG/DL (ref 8.7–10.4)
CHLORIDE SERPL-SCNC: 102 MMOL/L (ref 98–112)
CO2 SERPL-SCNC: 26 MMOL/L (ref 21–32)
CREAT BLD-MCNC: 0.51 MG/DL
DEPRECATED HBV CORE AB SER IA-ACNC: 55 NG/ML
DEPRECATED RDW RBC AUTO: 51.4 FL (ref 35.1–46.3)
EGFRCR SERPLBLD CKD-EPI 2021: 128 ML/MIN/1.73M2 (ref 60–?)
EOSINOPHIL # BLD AUTO: 0.17 X10(3) UL (ref 0–0.7)
EOSINOPHIL NFR BLD AUTO: 1 %
ERYTHROCYTE [DISTWIDTH] IN BLOOD BY AUTOMATED COUNT: 18.9 % (ref 11–15)
FASTING STATUS PATIENT QL REPORTED: NO
GLOBULIN PLAS-MCNC: 3.2 G/DL (ref 2–3.5)
GLUCOSE BLD-MCNC: 102 MG/DL (ref 70–99)
HCT VFR BLD AUTO: 24.7 %
HGB BLD-MCNC: 7 G/DL
IMM GRANULOCYTES # BLD AUTO: 0.1 X10(3) UL (ref 0–1)
IMM GRANULOCYTES NFR BLD: 0.6 %
LYMPHOCYTES # BLD AUTO: 2.58 X10(3) UL (ref 1–4)
LYMPHOCYTES NFR BLD AUTO: 15.3 %
MCH RBC QN AUTO: 21.3 PG (ref 26–34)
MCHC RBC AUTO-ENTMCNC: 28.3 G/DL (ref 31–37)
MCV RBC AUTO: 75.3 FL
MONOCYTES # BLD AUTO: 1.27 X10(3) UL (ref 0.1–1)
MONOCYTES NFR BLD AUTO: 7.5 %
NEUTROPHILS # BLD AUTO: 12.75 X10 (3) UL (ref 1.5–7.7)
NEUTROPHILS # BLD AUTO: 12.75 X10(3) UL (ref 1.5–7.7)
NEUTROPHILS NFR BLD AUTO: 75.5 %
OSMOLALITY SERPL CALC.SUM OF ELEC: 277 MOSM/KG (ref 275–295)
PLATELET # BLD AUTO: 804 10(3)UL (ref 150–450)
POTASSIUM SERPL-SCNC: 3.7 MMOL/L (ref 3.5–5.1)
PROT SERPL-MCNC: 6.4 G/DL (ref 5.7–8.2)
RBC # BLD AUTO: 3.28 X10(6)UL
RH BLOOD TYPE: NEGATIVE
SODIUM SERPL-SCNC: 134 MMOL/L (ref 136–145)
WBC # BLD AUTO: 16.9 X10(3) UL (ref 4–11)

## 2025-03-07 RX ORDER — ACETAMINOPHEN 160 MG/5ML
LIQUID ORAL EVERY 6 HOURS PRN
COMMUNITY

## 2025-03-07 RX ORDER — ACETAMINOPHEN 325 MG/1
650 TABLET ORAL ONCE
OUTPATIENT
Start: 2025-03-07

## 2025-03-07 RX ORDER — COVID-19 ANTIGEN TEST
220 KIT MISCELLANEOUS AS NEEDED
COMMUNITY

## 2025-03-07 RX ORDER — DIPHENHYDRAMINE HCL 25 MG
25 CAPSULE ORAL ONCE
OUTPATIENT
Start: 2025-03-07

## 2025-03-10 ENCOUNTER — HOSPITAL ENCOUNTER (EMERGENCY)
Facility: HOSPITAL | Age: 45
Discharge: LEFT BEFORE DISCHARGE | End: 2025-03-10
Attending: EMERGENCY MEDICINE
Payer: MEDICAID

## 2025-03-10 ENCOUNTER — OFFICE VISIT (OUTPATIENT)
Age: 45
End: 2025-03-10
Attending: STUDENT IN AN ORGANIZED HEALTH CARE EDUCATION/TRAINING PROGRAM
Payer: MEDICAID

## 2025-03-10 VITALS
SYSTOLIC BLOOD PRESSURE: 102 MMHG | DIASTOLIC BLOOD PRESSURE: 67 MMHG | OXYGEN SATURATION: 100 % | HEIGHT: 69 IN | BODY MASS INDEX: 13.33 KG/M2 | WEIGHT: 90 LBS | HEART RATE: 80 BPM | TEMPERATURE: 98 F | RESPIRATION RATE: 20 BRPM

## 2025-03-10 DIAGNOSIS — C21.0 SQUAMOUS CELL CANCER, ANUS (HCC): ICD-10-CM

## 2025-03-10 DIAGNOSIS — D64.9 ANEMIA: Primary | ICD-10-CM

## 2025-03-10 DIAGNOSIS — D64.9 ANEMIA, UNSPECIFIED TYPE: ICD-10-CM

## 2025-03-10 DIAGNOSIS — D72.829 LEUKOCYTOSIS, UNSPECIFIED TYPE: Primary | ICD-10-CM

## 2025-03-10 LAB
ALBUMIN SERPL-MCNC: 3 G/DL (ref 3.2–4.8)
ALBUMIN/GLOB SERPL: 1 {RATIO} (ref 1–2)
ALP LIVER SERPL-CCNC: 69 U/L
ALT SERPL-CCNC: <7 U/L
ANION GAP SERPL CALC-SCNC: 9 MMOL/L (ref 0–18)
ANTIBODY SCREEN: NEGATIVE
AST SERPL-CCNC: <8 U/L (ref ?–34)
BASOPHILS # BLD AUTO: 0.03 X10(3) UL (ref 0–0.2)
BASOPHILS NFR BLD AUTO: 0.1 %
BILIRUB SERPL-MCNC: 0.3 MG/DL (ref 0.3–1.2)
BUN BLD-MCNC: 10 MG/DL (ref 9–23)
BUN/CREAT SERPL: 20 (ref 10–20)
CALCIUM BLD-MCNC: 8.8 MG/DL (ref 8.7–10.4)
CHLORIDE SERPL-SCNC: 101 MMOL/L (ref 98–112)
CO2 SERPL-SCNC: 24 MMOL/L (ref 21–32)
CREAT BLD-MCNC: 0.5 MG/DL
DEPRECATED RDW RBC AUTO: 51.9 FL (ref 35.1–46.3)
EGFRCR SERPLBLD CKD-EPI 2021: 129 ML/MIN/1.73M2 (ref 60–?)
EOSINOPHIL # BLD AUTO: 0.16 X10(3) UL (ref 0–0.7)
EOSINOPHIL NFR BLD AUTO: 0.7 %
ERYTHROCYTE [DISTWIDTH] IN BLOOD BY AUTOMATED COUNT: 18.8 % (ref 11–15)
GLOBULIN PLAS-MCNC: 3.1 G/DL (ref 2–3.5)
GLUCOSE BLD-MCNC: 107 MG/DL (ref 70–99)
HCT VFR BLD AUTO: 22.7 %
HGB BLD-MCNC: 6.4 G/DL
IMM GRANULOCYTES # BLD AUTO: 0.15 X10(3) UL (ref 0–1)
IMM GRANULOCYTES NFR BLD: 0.7 %
INR BLD: 1.05 (ref 0.8–1.2)
LYMPHOCYTES # BLD AUTO: 1.96 X10(3) UL (ref 1–4)
LYMPHOCYTES NFR BLD AUTO: 8.7 %
MCH RBC QN AUTO: 21.5 PG (ref 26–34)
MCHC RBC AUTO-ENTMCNC: 28.2 G/DL (ref 31–37)
MCV RBC AUTO: 76.2 FL
MONOCYTES # BLD AUTO: 1.68 X10(3) UL (ref 0.1–1)
MONOCYTES NFR BLD AUTO: 7.5 %
NEUTROPHILS # BLD AUTO: 18.5 X10 (3) UL (ref 1.5–7.7)
NEUTROPHILS # BLD AUTO: 18.5 X10(3) UL (ref 1.5–7.7)
NEUTROPHILS NFR BLD AUTO: 82.3 %
OSMOLALITY SERPL CALC.SUM OF ELEC: 278 MOSM/KG (ref 275–295)
PLATELET # BLD AUTO: 753 10(3)UL (ref 150–450)
POTASSIUM SERPL-SCNC: 3.5 MMOL/L (ref 3.5–5.1)
PROT SERPL-MCNC: 6.1 G/DL (ref 5.7–8.2)
PROTHROMBIN TIME: 14.3 SECONDS (ref 11.6–14.8)
RBC # BLD AUTO: 2.98 X10(6)UL
RH BLOOD TYPE: NEGATIVE
SODIUM SERPL-SCNC: 134 MMOL/L (ref 136–145)
WBC # BLD AUTO: 22.5 X10(3) UL (ref 4–11)

## 2025-03-10 PROCEDURE — 99285 EMERGENCY DEPT VISIT HI MDM: CPT

## 2025-03-10 PROCEDURE — 80053 COMPREHEN METABOLIC PANEL: CPT | Performed by: EMERGENCY MEDICINE

## 2025-03-10 PROCEDURE — 86900 BLOOD TYPING SEROLOGIC ABO: CPT | Performed by: EMERGENCY MEDICINE

## 2025-03-10 PROCEDURE — 85610 PROTHROMBIN TIME: CPT | Performed by: EMERGENCY MEDICINE

## 2025-03-10 PROCEDURE — 36415 COLL VENOUS BLD VENIPUNCTURE: CPT

## 2025-03-10 PROCEDURE — 85025 COMPLETE CBC W/AUTO DIFF WBC: CPT | Performed by: EMERGENCY MEDICINE

## 2025-03-10 PROCEDURE — 86901 BLOOD TYPING SEROLOGIC RH(D): CPT | Performed by: EMERGENCY MEDICINE

## 2025-03-10 PROCEDURE — 86920 COMPATIBILITY TEST SPIN: CPT

## 2025-03-10 PROCEDURE — 86850 RBC ANTIBODY SCREEN: CPT | Performed by: EMERGENCY MEDICINE

## 2025-03-10 PROCEDURE — 36430 TRANSFUSION BLD/BLD COMPNT: CPT

## 2025-03-10 RX ORDER — ACETAMINOPHEN 160 MG/5ML
1000 SOLUTION ORAL ONCE
Status: COMPLETED | OUTPATIENT
Start: 2025-03-10 | End: 2025-03-10

## 2025-03-10 NOTE — ED INITIAL ASSESSMENT (HPI)
Received pt via EMS. Pt has hx of anal cancer - denies metastasis. Pt reports \"I was at the cancer center to get a blood transfusion. I couldn't sit down, so they sent me here. It is very hard to sit down due to my cancer\". Pt c/o generalized weakness. Pt denies cp/sob/dizziness/rectal bleeding/ or hematuria. Denies taking blood thinners.

## 2025-03-10 NOTE — ED QUICK NOTES
Pt tolerated 1st unit of blood. No signs of adverse reactions. Vital signs stable. RR normal non-labored. Blood bank contacted for 2nd unit. Al safety measures are in place.

## 2025-03-10 NOTE — PROGRESS NOTES
Patient arrived for transfusion this morning. When RN went to room patient, he was found lying on couch and stated that he was unable to sit due to pain in his rectum and feeling dizzy. Pain level was rated 11/10 per patient statement. Patient stated that he wanted a stretcher or bed to receive his blood transfusions. RN stated that we had a recliner but did not have a bed or stretcher. Patient stated that he would not be able to complete the transfusion in this manner. Patient was ashen in color and vitals were taken with BP 85/45, , 97%O2% on room air. Patient was given the option to go to the ED with his caregiver or via ambulance. Patient stated that he became very dizzy when ambulating and was unable to sit in the car to go to the ED and wait. Paramedics were called and patient was transported to ED for further treatment. Facesheet printed and given to paramedics and caregiver accompanied patient.

## 2025-03-10 NOTE — DISCHARGE INSTRUCTIONS
You did not wish for any infectious workup nor imaging today.  Please follow with oncology as an outpatient.  He is aware that you were here today.  Return to ER for any worsening symptoms  The Emergency Department is not intended to be a substitute for an effort to provide complete medical care. The imaging, if any, have often been interpreted on a preliminary basis pending final reading by the radiologist. If your blood pressure was greater than 140/90, please have this blood pressure rechecked by your primary care provider beatriz the next few days. You will benefit from a further discussion of lifestyle modifications that include Weight Reduction - Dietary Sodium Restriction - Increased Physical Activity and Moderation in alcohol (ETOH) Consumption. If possible check your pressure at home and keep a blood pressure log to bring to your physician.

## 2025-03-10 NOTE — ED PROVIDER NOTES
Patient Seen in: [facility]         EMERGENCY DEPARTMENT NOTE    Dictated. Voice Transcription software has been utilized for this dictation (the reader should be aware that typographical errors are possible with voice transcription software and to please contact the dictating physician if there are questions.)         History     Chief Complaint   Patient presents with    Weakness       There may be discrepancies from triage note.     HPI    History provided by nursing staff spoke to EMS and patient.  Per nursing staff who spoke to EMS, patient's blood pressure was in the 80s systolic and route from transfusion center to here.  44-year-old male who reports a history of anal cancer with a mass in his anus that inhibits him from sitting directly onto his buttocks states that he needs a 2 unit blood transfusion.  Reports receiving blood transfusion in the past due to anemia secondary to his cancer.  Denies any bleeding from his rectum at this time.  Family refuses a anal/rectal exam.  Denies abdominal pain.  Denies bleeding from any orifice.  States that he went to the transfusion center per the request of his new oncologist here at this hospital to receive 2 units however they had no stretchers for him to lay flat.  States that he is unable to sit comfortably on his buttocks secondary to his large mass.  States that he is to be scheduled to meet with a surgeon to have a possible resection.    States that he is currently not receiving chemo/radiation.    No fevers, chills, nausea, vomiting, diarrhea, constipation, cough, cold symptoms, urinary complaints.  No chest pain, shortness of breath.  No headache, neck pain, neck stiffness, incontinence.    No changes in mentation, no changes in vision, no total/new extremity weakness, no total/new extremity paresthesia, no difficulty speaking.  No alleviating or exacerbating factors..  Denies orthopnea, pnd, change in exercise tolerance limited by chest pain/sob ,  lower extremity edema/asymmetry.      Reports mild fatigue.  No chest pain, shortness of        Per ems 80s   History reviewed.   Past Medical History:    Anal cancer (HCC)       History reviewed. History reviewed. No pertinent surgical history.      Medications :  Prescriptions Prior to Admission[1]     Family History   Problem Relation Age of Onset    Other (Hx of breast cancer) Mother        Smoking Status:   Social History     Socioeconomic History    Marital status: Single   Tobacco Use    Smoking status: Never    Smokeless tobacco: Never   Substance and Sexual Activity    Alcohol use: Never    Drug use: Never       Review of Systems   Constitutional: Negative.    HENT: Negative.     Eyes: Negative.    Respiratory: Negative.     Cardiovascular: Negative.    Gastrointestinal: Negative.    Genitourinary: Negative.    Musculoskeletal: Negative.    Skin: Negative.    Neurological: Negative.    Endo/Heme/Allergies: Negative.    Psychiatric/Behavioral: Negative.     All other systems reviewed and are negative.    Pertinent positives as listed.  All other organ systems are reviewed and are negative.    Constitutional and vital signs reviewed.      Social History and Family History elements reviewed from today, pertinent positives to the presenting problem noted.    Physical Exam     ED Triage Vitals   BP 03/10/25 0810 96/54   Pulse 03/10/25 0806 101   Resp 03/10/25 0811 18   Temp 03/10/25 0806 98.6 °F (37 °C)   Temp src 03/10/25 0806 Oral   SpO2 03/10/25 0806 99 %   O2 Device 03/10/25 0806 None (Room air)       All measures to prevent infection transmission during my interaction with the patient were taken. The patient was already wearing a droplet mask on my arrival to the room. Personal protective equipment including droplet mask, eye protection, and gloves were worn throughout the duration of the exam.  Handwashing was performed prior to and after the exam.  Stethoscope and any equipment used during my examination  was cleaned with super sani-cloth germicidal wipes following the exam.     Physical Exam  Vitals and nursing note reviewed.   Constitutional:       General: He is not in acute distress.     Appearance: He is not ill-appearing or toxic-appearing.      Comments: Thin male who appears older than his stated age   HENT:      Head: Normocephalic and atraumatic.   Cardiovascular:      Rate and Rhythm: Normal rate and regular rhythm.      Comments: Radial pulses 2+ bilaterally    Pulmonary:      Effort: Pulmonary effort is normal. No respiratory distress.      Breath sounds: No stridor. No wheezing, rhonchi or rales.   Chest:      Chest wall: No tenderness.   Abdominal:      General: There is no distension.      Palpations: Abdomen is soft.      Tenderness: There is no abdominal tenderness. There is no guarding or rebound.      Comments: Negative Virgen sign, negative McBurney's point tenderness     Genitourinary:     Comments: Rectal exam/buttock exam: Patient refused kindly  Musculoskeletal:         General: No deformity.      Right lower leg: No edema.      Left lower leg: No edema.   Skin:     Capillary Refill: Capillary refill takes less than 2 seconds.   Neurological:      Mental Status: He is alert.      Comments: Gross motor and sensory function intact symmetrically and bilaterally to upper extremities and lower extremities.   Psychiatric:         Mood and Affect: Mood normal.         Behavior: Behavior normal.           Review of prior notes in Care everywhere/Epic performed by myself:  Hgb 7.0 on 3.7.2025 ; 10/24 pt's hgb was 6.2 and he required transfusion per his hx    Of note oncology notes in our system pelvis imaging noted in our system  Patient had an MRI of his pelvis October 2024 revealing a large lesion extending inferiorly from the perineum: 7.1 x 4.4 cm superimposed rectal prolapse questionable at that time.  Questionable superimposed abscesses also noted      ED Course     If labs obtained, they are  personally reviewed by myself:     Labs Reviewed   CBC WITH DIFFERENTIAL WITH PLATELET - Abnormal; Notable for the following components:       Result Value    WBC 22.5 (*)     RBC 2.98 (*)     HGB 6.4 (*)     HCT 22.7 (*)     MCV 76.2 (*)     MCH 21.5 (*)     MCHC 28.2 (*)     RDW-SD 51.9 (*)     RDW 18.8 (*)     .0 (*)     Neutrophil Absolute Prelim 18.50 (*)     Neutrophil Absolute 18.50 (*)     Monocyte Absolute 1.68 (*)     All other components within normal limits   COMP METABOLIC PANEL (14) - Abnormal; Notable for the following components:    Glucose 107 (*)     Sodium 134 (*)     Creatinine 0.50 (*)     ALT <7 (*)     Albumin 3.0 (*)     All other components within normal limits   PROTHROMBIN TIME (PT) - Normal   TYPE AND SCREEN    Narrative:     The following orders were created for panel order Type and screen.  Procedure                               Abnormality         Status                     ---------                               -----------         ------                     ABORH (Blood Type)[[id number]]                               Final result               Antibody Screen[[id number]]                                  Final result                 Please view results for these tests on the individual orders.   ABORH (BLOOD TYPE)   ANTIBODY SCREEN   PREPARE RBC   RAINBOW DRAW LAVENDER   RAINBOW DRAW LIGHT GREEN   RAINBOW DRAW BLUE       If radiologic studies ordered during today's ER visit, my independent interpretation are seen directly below.  This is awaiting the radiologist's final interpretation.  Ct abd/pelvis- pt kindly refused     Imaging Results read by radiology in ED: No results found.        ED Medications Administered:   Medications   acetaminophen (Tylenol) 160 MG/5ML oral liquid 1,000 mg (1,000 mg Oral Given 3/10/25 1046)           Vitals:    03/10/25 1030 03/10/25 1045 03/10/25 1115 03/10/25 1132   BP: 95/55 101/56 92/49 94/51   Pulse: 91 101 89 89   Resp: 20 14 22 20   Temp:        TempSrc:       SpO2: 99% 99%     Weight:       Height:         *I personally reviewed and interpreted all ED vitals.    Pulse Ox interpretation by myself: 99%, Room air, Normal     Monitor Interpretation by myself:   normal sinus rhythm    If Ekg obtained during today's visit, it is independently interpreted by myself directly below:      Medical Record Review: I personally reviewed available prior medical records for any recent pertinent discharge summaries, testing, and procedures and reviewed those reports.      [facility]     Medical decision making/ED Course:   44-year-old male with history of anal CA requesting blood transfusion, 2 units.  Chart review reveals no recent oncology note in the system however patient reports following with Dr. Conn.   Blood work today revealing hemoglobin of 6.4, leukocytosis of 22.5.  It appears patient had elevated white blood cell counts previously however this is higher than his last white blood cell count of approximately 17.  BMP revealing stable electrolytes, LFTs appear normal.  INR normal.  Type and screen revealing AB- blood.  patient kindly refused rectal/buttock exam.  He denies abdominal pain and denies bleeding from any orifice.  States that he has received transfusions in the past for his cancer.  I personally spoke to patient's new oncologist, Dr. Conn-states that he has not finished his note on this patient just yet.  He confirms that he has evaluated this patient and he does not suspect a true infectious pathology.  He is aware that patient refused imaging/infectious workup.  Today.  He states patient also refused imaging with him during his last visit.    Given significant leukocytosis, previous MRI findings, I requested to perform imaging/infectious workup and possible admission with patient however he kindly refused.  Pt is aox3, has full decision making capability, and shows no signs of intoxication. Patient understands the risks of leaving Against Medical  Advice today including worsening symptoms, change in vital signs, death, disability, but still requests to leave AMA. Patient is encouraged to return to the ER for any worsening/persistent symptoms and will otherwise follow with a primary care physician. Patient will sign AMA form with RN.     Dr. Conn aware of the aforementioned and will arrange for follow-up.  He states patient may benefit from hospice/palliative care        2 Units to be transferred.  Patient is an extremely thin male.  Blood pressures are in the 90s-does not seem unusual or his size.  Upon chart review it appears patient had similar blood pressures 3 days ago  Strict return instructions given.  Patient encouraged to follow-up with primary care provider in the next few days.  Advised to return to the emergency department for any worsening symptoms    Patient is non toxic appearing, is in no distress, hemodynamically stable.  Pt agrees and is aware of plan.         Differential Diagnosis:  as listed above in medical decision making.   *Please note that in the presenting to the emergency department, illness/injury that poses a threat to life or function is considered during this patient's initial evaluation.    The complexity of this visit is therefore inherently more complex given the need to consider life threatening pathology prior to any other etiology for this patient's visit.    The differential diagnosis and medical decision above exemplify this rationale.       Medical Decision Making  Problems Addressed:  Anemia, unspecified type: acute illness or injury  Leukocytosis, unspecified type: acute illness or injury    Amount and/or Complexity of Data Reviewed  External Data Reviewed: notes.  Labs: ordered. Decision-making details documented in ED Course.  Radiology: ordered and independent interpretation performed. Decision-making details documented in ED Course.  Discussion of management or test interpretation with external provider(s):   Fabien.     Risk  Diagnosis or treatment significantly limited by social determinants of health.          ED Course as of 03/10/25 1147  ------------------------------------------------------------  Time: 03/10 0821  Comment:        Vitals:    03/10/25 1030 03/10/25 1045 03/10/25 1115 03/10/25 1132   BP: 95/55 101/56 92/49 94/51   Pulse: 91 101 89 89   Resp: 20 14 22 20   Temp:       TempSrc:       SpO2: 99% 99%     Weight:       Height:                 Complicating Factors: Significant medical problems that contribute to the complexity of this emergency room evaluation is listed above.    Condition upon leaving the department: Stable    Disposition and Plan     Clinical Impression:  1. Leukocytosis, unspecified type    2. Anemia, unspecified type        Disposition:  [place]    Medications Prescribed:  Current Discharge Medication List          I have discussed the discharge plan with the patient and/or family or well wisher present in the room with the patient's permission.  They state that they understand and agree with the plan.  All questions regarding their care have been answered prior to discharge.  They are aware: Emergency Department is not intended to be a substitute for an effort to provide complete medical care. The imaging, if any, have often been interpreted on a preliminary basis pending final reading by the radiologist.  Instructed to return immediately to the ED if any changes or worsening of condition should occur.  If patient's blood pressure was greater than 140/90 today, patient encouraged to have this blood pressure rechecked with primary MD and blood pressure education provided.                       [1] (Not in a hospital admission)

## 2025-03-11 LAB
BLOOD TYPE BARCODE: 600
UNIT VOLUME: 350 ML

## 2025-03-13 NOTE — CONSULTS
[facility] Hematology Oncology  Initial Oncology Clinic Consult Note    Patient Name: Joseph Angel   YOB: 1980   Medical Record Number: [id number]    Subjective:   Joseph Angel is a 44 year old male with a history of neglected anal cancer who presents to establish with medical oncology.  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  He has previously refused treatment on multiple occasions.  He comes seeking to establish care, requesting palliative surgical resection and transfusion support for his iron deficiency anemia.    At baseline he is quite debilitated and emaciated, he is essentially bedbound, he has a caregiver who assists with his daily activities.  Complains of significant pain is a mass is protruding externally to his buttocks bilaterally.  He has difficulty walking.  He is still able to have bowel movements although they are often liquid or very soft stools.  He has a low appetite has lost significant weight (only 90 lbs).  He continues to refuse medical treatment stating he does not want \"the poison\" of chemotherapy or radiation.        Oncology history:   Diagnosis: anal SCC  Date of diagnosis: April 19, 2023    Initial presentation: painful anal mass first noted by patient around end of 2021, pt thought was 2/2 animal bite but progressively grew and caused difficulty voiding. Noted intermittent anal spotting beginning Aug 2022  April 2023 CEA 3.1  Aug 2024 HIV nonreactive    4/19/23 CT AP ([facility]): 1. Internal verge nodular hyperdense/enhancing soft tissue, possibly  corresponding to patient's reported primary neoplasm.  2. No evidence of metastatic disease.     4/20/23 CT chest ([facility]): 1. Small left major fissure pleural base nodule, likely benign given  katelynn-fissural location.  2. Otherwise, no evidence of metastatic disease in the chest.      (Was recommended colonoscopy, biopsy, further workup but patient declined after extensive review of risks)    8/3/24 MRI abd ([facility]): 1. Large lesion extending inferiorly from the perineum measures over an  area of 7.1 x 4.4 cm, with some variable enhancement. Findings likely  relate to the known mass, however a degree of superimposed rectal prolapse  is difficult to exclude in the background. PET scan may be considered to  assess extent of uptake.  2. Portions of the lesion are difficult to differentiate from the posterior  aspect of the prostate gland. There is also diffuse ill-defined stranding  and enhancement in the perirectal fat.  3. There may be some smaller peripherally enhancing collections in the  pelvic sidewalls bilaterally, measuring up to 2.2 x 0.8 cm in size on the  right. This could relate to superimposed abscesses, though too small to  drain.  4. No metastatic disease to the soft tissues of the abdomen within the  confines of a limited exam.  5. Moderate to heavy colonic stool load. No dilated bowel loops to indicate  mechanical obstruction are seen.  6. Additional background chronic appearing findings as described above.     8/2/25 Path ([facility]): Invasive, moderately differentiated squamous cell carcinoma    Immunostain for p16 demonstrates block positivity in the carcinoma, consistent with a HPV driven process.  Case subjected to intradepartmental QA review.    Left AMA, lost to f/u until 10/2024 when presented to [facility] ED for anal bleeding, hgb 6.2, given 2 units pRBC and d/c'd - followed up with ELBERT Barnes, Feb 2025 for anemia - notes he is in \"palliative mode\" possibly wants to be seen for palliative transfusions    Review of Systems:  Hematology/Oncology ROS performed and negative except as above in HPI    History/Other:   Past Medical History:  Past Medical History:    Anal cancer (HCC)       Past Surgical History:  History reviewed. No pertinent surgical  history.    Current Medications:   acetaminophen 160 MG/5ML Oral Solution Take 500-1,000 mg by mouth every 6 (six) hours as needed.      Naproxen Sodium (ALEVE) 220 MG Oral Cap Take 220 mg by mouth as needed.      MEBENDAZOLE OR Take 222 mg by mouth in the morning and 222 mg before bedtime.         Allergies:   Allergies[1]    Family Medical History:  Family History   Problem Relation Age of Onset    Other (Hx of breast cancer) Mother        Social History:  Social History     Socioeconomic History    Marital status: Single     Spouse name: Not on file    Number of children: Not on file    Years of education: Not on file    Highest education level: Not on file   Occupational History    Not on file   Tobacco Use    Smoking status: Never    Smokeless tobacco: Never   Substance and Sexual Activity    Alcohol use: Never    Drug use: Never    Sexual activity: Not on file   Other Topics Concern    Not on file   Social History Narrative    Not on file     Social Drivers of Health     Food Insecurity: Not on File (2024)    Received from [place]    Food Insecurity     Food: 0   Transportation Needs: Not At Risk (2024)    Received from [facility]    Transportation Needs     In the past 12 months, has lack of reliable transportation kept you from medical appointments, meetings, work or from getting things needed for daily living? : No   Housing Stability: Not on File (2024)    Received from [place]    Housing Stability     Housin       Objective:   Blood pressure 99/65, pulse 93, temperature 98.1 °F (36.7 °C), temperature source Oral, resp. rate 16, weight 40.8 kg (90 lb), SpO2 99%.  Physical Exam:  ECOG PS: 3-4  General: A&Ox3, NAD, emaciated with temporal wasting   HEENT: PERRL, OP clear  CV: RRR, no murmurs  Pulm: CTA b/l, no w/r/r, normal effort  Abd: soft, ntnd, normal bowel sounds, anal mass externally viewed, flat sessile-like mass protruding through intergluteal cleft   Lymph: no palpable  lymphadenopathy throughout the cervical, supraclavicular, axillary, or inguinal regions  Extremities: no edema  Neurological: grossly intact    Results:   Labs:  Lab Results   Component Value Date    WBC 22.5 (H) 03/10/2025    HGB 6.4 (LL) 03/10/2025    HCT 22.7 (L) 03/10/2025    .0 (H) 03/10/2025    CREATSERUM 0.50 (L) 03/10/2025    BUN 10 03/10/2025     (L) 03/10/2025    K 3.5 03/10/2025     03/10/2025    CO2 24.0 03/10/2025     (H) 03/10/2025    CA 8.8 03/10/2025    ALB 3.0 (L) 03/10/2025    ALKPHO 69 03/10/2025    BILT 0.3 03/10/2025    TP 6.1 03/10/2025    AST <8 03/10/2025    ALT <7 (L) 03/10/2025    PTT 28.8 10/28/2024    INR 1.05 03/10/2025     Imaging/pathology:  As above in oncology history       Assessment & Plan:   Joseph Angel is a 44 year old male with a history of neglected anal cancer as detailed in the above oncology history who presents to establish with medical oncology.  He comes requesting palliative surgical resection and transfusion support for his iron deficiency anemia.    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  He admits to a good understanding of concurrent chemoradiation as standard of care treatment and is adamantly against undergoing concurrent chemo RT.  I discussed restaging imaging in order to assess if he has subsequently developed metastatic disease as last imaging in the fall 2024.  He declines additional imaging at this time also expressing concerns of radiation from a CT scan.  His is mainly inquiring about a palliative resection of the external protrusion of his anal mass.  We did review the images in our tumor board and the mass invades into the prostate and inferior sacrum.  Ultimately, this mass is unresectable and any sort of a palliative resection of the external protrusion is not recommended.  He also  inquired about aggressive blood transfusions.  His hemoglobin is 7.0 and he is scheduled for a transfusion early next week.  He is also iron deficient and I encouraged him to schedule an IV iron infusion.  He is asking about aggressive transfusions for goal hemoglobin of 12-14.  I informed him that that is not something we will offer.  We also addressed his pain, he is using Tylenol and naproxen for this.  He also inquired about antihelminthic medications and I again informed him this is not something we will offer.    We did discuss that palliative radiation could be considered over concurrent chemoRT or we could consider induction chemotherapy if he is still proven not to have metastatic disease.  While not approved in the frontline setting, we could explore immunotherapy as a treatment option.  We briefly discussed this and he expressed some interest however at this time he continues to refuse additional restaging.    I will tentatively plan to see him back 2 months after IV iron infusion as we continue our discussions.    65 minutes were spent in patient discussion, coordination of care, record review, lab review, imaging review. The diagnosis, prognosis, and general treatment was explained and all questions answered.     Deyvi Conn, DO    [facility] Hematology/Oncology  [facility]     This note was created using a voice-recognition transcribing system. Incorrect words or phrases may have been missed during proofreading. Please interpret accordingly.       [1] No Known Allergies

## 2025-03-31 ENCOUNTER — TELEPHONE (OUTPATIENT)
Age: 45
End: 2025-03-31

## 2025-05-26 ENCOUNTER — HOSPITAL ENCOUNTER (EMERGENCY)
Facility: HOSPITAL | Age: 45
Discharge: HOME | End: 2025-05-26
Attending: STUDENT IN AN ORGANIZED HEALTH CARE EDUCATION/TRAINING PROGRAM
Payer: MEDICAID

## 2025-05-26 VITALS
SYSTOLIC BLOOD PRESSURE: 104 MMHG | BODY MASS INDEX: 13 KG/M2 | WEIGHT: 88.19 LBS | HEART RATE: 79 BPM | TEMPERATURE: 98 F | RESPIRATION RATE: 19 BRPM | DIASTOLIC BLOOD PRESSURE: 61 MMHG | OXYGEN SATURATION: 99 %

## 2025-05-26 DIAGNOSIS — D64.9 SYMPTOMATIC ANEMIA: ICD-10-CM

## 2025-05-26 DIAGNOSIS — K62.5 RECTAL BLEEDING: Primary | ICD-10-CM

## 2025-05-26 LAB
ALBUMIN SERPL-MCNC: 3.3 G/DL (ref 3.2–4.8)
ALBUMIN/GLOB SERPL: 0.9 {RATIO} (ref 1–2)
ALP LIVER SERPL-CCNC: 108 U/L (ref 45–117)
ALT SERPL-CCNC: <7 U/L (ref 10–49)
ANION GAP SERPL CALC-SCNC: 5 MMOL/L (ref 0–18)
ANTIBODY SCREEN: NEGATIVE
AST SERPL-CCNC: <8 U/L (ref ?–34)
BASOPHILS # BLD AUTO: 0.04 X10(3) UL (ref 0–0.2)
BASOPHILS NFR BLD AUTO: 0.2 %
BILIRUB SERPL-MCNC: 0.3 MG/DL (ref 0.3–1.2)
BUN BLD-MCNC: 19 MG/DL (ref 9–23)
BUN/CREAT SERPL: 21.3 (ref 10–20)
CALCIUM BLD-MCNC: 12.4 MG/DL (ref 8.7–10.4)
CHLORIDE SERPL-SCNC: 101 MMOL/L (ref 98–112)
CO2 SERPL-SCNC: 24 MMOL/L (ref 21–32)
CREAT BLD-MCNC: 0.89 MG/DL (ref 0.7–1.3)
DEPRECATED RDW RBC AUTO: 54.2 FL (ref 35.1–46.3)
EGFRCR SERPLBLD CKD-EPI 2021: 108 ML/MIN/1.73M2 (ref 60–?)
EOSINOPHIL # BLD AUTO: 0.01 X10(3) UL (ref 0–0.7)
EOSINOPHIL NFR BLD AUTO: 0 %
ERYTHROCYTE [DISTWIDTH] IN BLOOD BY AUTOMATED COUNT: 18.9 % (ref 11–15)
GLOBULIN PLAS-MCNC: 3.6 G/DL (ref 2–3.5)
GLUCOSE BLD-MCNC: 106 MG/DL (ref 70–99)
HCT VFR BLD AUTO: 25.4 % (ref 39–53)
HGB BLD-MCNC: 7 G/DL (ref 13–17.5)
IMM GRANULOCYTES # BLD AUTO: 0.15 X10(3) UL (ref 0–1)
IMM GRANULOCYTES NFR BLD: 0.6 %
LYMPHOCYTES # BLD AUTO: 1.75 X10(3) UL (ref 1–4)
LYMPHOCYTES NFR BLD AUTO: 7.1 %
MCH RBC QN AUTO: 22.4 PG (ref 26–34)
MCHC RBC AUTO-ENTMCNC: 27.6 G/DL (ref 31–37)
MCV RBC AUTO: 81.2 FL (ref 80–100)
MONOCYTES # BLD AUTO: 1.2 X10(3) UL (ref 0.1–1)
MONOCYTES NFR BLD AUTO: 4.9 %
NEUTROPHILS # BLD AUTO: 21.48 X10 (3) UL (ref 1.5–7.7)
NEUTROPHILS # BLD AUTO: 21.48 X10(3) UL (ref 1.5–7.7)
NEUTROPHILS NFR BLD AUTO: 87.2 %
OSMOLALITY SERPL CALC.SUM OF ELEC: 273 MOSM/KG (ref 275–295)
PLATELET # BLD AUTO: 657 10(3)UL (ref 150–450)
POTASSIUM SERPL-SCNC: 4.4 MMOL/L (ref 3.5–5.1)
PROT SERPL-MCNC: 6.9 G/DL (ref 5.7–8.2)
RBC # BLD AUTO: 3.13 X10(6)UL (ref 4.3–5.7)
RH BLOOD TYPE: NEGATIVE
SODIUM SERPL-SCNC: 130 MMOL/L (ref 136–145)
WBC # BLD AUTO: 24.6 X10(3) UL (ref 4–11)

## 2025-05-26 PROCEDURE — 96360 HYDRATION IV INFUSION INIT: CPT

## 2025-05-26 PROCEDURE — 93005 ELECTROCARDIOGRAM TRACING: CPT

## 2025-05-26 PROCEDURE — 36430 TRANSFUSION BLD/BLD COMPNT: CPT

## 2025-05-26 PROCEDURE — 99285 EMERGENCY DEPT VISIT HI MDM: CPT

## 2025-05-26 PROCEDURE — 80053 COMPREHEN METABOLIC PANEL: CPT | Performed by: STUDENT IN AN ORGANIZED HEALTH CARE EDUCATION/TRAINING PROGRAM

## 2025-05-26 PROCEDURE — 86920 COMPATIBILITY TEST SPIN: CPT

## 2025-05-26 PROCEDURE — 93010 ELECTROCARDIOGRAM REPORT: CPT

## 2025-05-26 PROCEDURE — 86901 BLOOD TYPING SEROLOGIC RH(D): CPT | Performed by: STUDENT IN AN ORGANIZED HEALTH CARE EDUCATION/TRAINING PROGRAM

## 2025-05-26 PROCEDURE — 85025 COMPLETE CBC W/AUTO DIFF WBC: CPT | Performed by: STUDENT IN AN ORGANIZED HEALTH CARE EDUCATION/TRAINING PROGRAM

## 2025-05-26 PROCEDURE — 86900 BLOOD TYPING SEROLOGIC ABO: CPT | Performed by: STUDENT IN AN ORGANIZED HEALTH CARE EDUCATION/TRAINING PROGRAM

## 2025-05-26 PROCEDURE — 86850 RBC ANTIBODY SCREEN: CPT | Performed by: STUDENT IN AN ORGANIZED HEALTH CARE EDUCATION/TRAINING PROGRAM

## 2025-05-26 RX ORDER — HYDROCODONE BITARTRATE AND ACETAMINOPHEN 5; 325 MG/1; MG/1
1 TABLET ORAL ONCE
Refills: 0 | Status: COMPLETED | OUTPATIENT
Start: 2025-05-26 | End: 2025-05-26

## 2025-05-26 NOTE — ED INITIAL ASSESSMENT (HPI)
Pt to ED via [place] by friend with c/o increased rectal bleeding and increased pain. Pt with hx of rectal cancer. Pt pale in appearance. Pt is alert and oriented x4. No respiratory distress noted.

## 2025-05-26 NOTE — ED QUICK NOTES
Rn rounded on patient  Blood product infusing at this time  Patient on nibp cardiac and spo2 monitors.  Call light within reach

## 2025-05-26 NOTE — ED PROVIDER NOTES
Patient Seen in: [facility] Emergency Department        History  Chief Complaint   Patient presents with    GI Bleeding     Stated Complaint: cancer/pain    Subjective:   HPI            46 yo male, with history of anal cancer not currently on chemotherapy presenting with increased rectal bleeding.  He states several days recurrent pain and rectal bleeding.  Denies fevers or chills.      Objective:     Past Medical History:    Anal cancer (HCC)              History reviewed. No pertinent surgical history.             Social History     Socioeconomic History    Marital status: Single   Tobacco Use    Smoking status: Never    Smokeless tobacco: Never   Substance and Sexual Activity    Alcohol use: Never    Drug use: Never     Social Drivers of Health     Food Insecurity: Not on File (2024)    Received from [employer]    Food [employer]     Food: 0   Transportation Needs: Not At Risk (2024)    Received from [facility]    Transportation Needs     In the past 12 months, has lack of reliable transportation kept you from medical appointments, meetings, work or from getting things needed for daily living? : No   Housing Stability: Not on File (2024)    Received from [employer]    Housing Stability     Housin                                Physical Exam    ED Triage Vitals   BP 25 1116 (!) 88/65   Pulse 25 1116 94   Resp 25 1116 18   Temp 25 1116 98.6 °F (37 °C)   Temp src 25 1116 Temporal   SpO2 25 1130 100 %   O2 Device 25 1130 None (Room air)       Current Vitals:   Vital Signs  BP: 90/63  Pulse: 88  Resp: 19  Temp: 98.4 °F (36.9 °C)  Temp src: Temporal  MAP (mmHg): 72    Oxygen Therapy  SpO2: 100 %  O2 Device: None (Room air)            Physical Exam  Constitutional: awake, alert, no sig distress  HENT: mmm, no lesions,  Neck: normal range of motion, no tenderness, supple.  Eyes: PERRL, EOMI, conjunctiva normal, no discharge. Sclera  anicteric.  Cardiovascular: rr no murmur  Respiratory: Normal breath sounds, no respiratory distress, no wheezing, no chest tenderness.  GI: Bowel sounds normal, Soft, no tenderness, no masses, no pulsatile masses.  -Rectal exam deferred on request of patient  : No CVA tenderness.  Skin: Warm, dry, no erythema, no rash.  Musculoskeletal: Intact distal pulses, no edema, no tenderness, no cyanosis, no clubbing. Good range of motion in all major joints. No tenderness to palpation or major deformities noted. Back- No tenderness.  Neurologic: Alert & oriented x 3, normal motor function, normal sensory function, no focal deficits noted.  Psych: Calm, cooperative, nl affect        ED Course  Labs Reviewed   CBC WITH DIFFERENTIAL WITH PLATELET - Abnormal; Notable for the following components:       Result Value    WBC 24.6 (*)     RBC 3.13 (*)     HGB 7.0 (*)     HCT 25.4 (*)     MCH 22.4 (*)     MCHC 27.6 (*)     RDW-SD 54.2 (*)     RDW 18.9 (*)     .0 (*)     Neutrophil Absolute Prelim 21.48 (*)     Neutrophil Absolute 21.48 (*)     Monocyte Absolute 1.20 (*)     All other components within normal limits   COMP METABOLIC PANEL (14) - Abnormal; Notable for the following components:    Glucose 106 (*)     Sodium 130 (*)     BUN/CREA Ratio 21.3 (*)     Calcium, Total 12.4 (*)     Calculated Osmolality 273 (*)     ALT <7 (*)     Globulin  3.6 (*)     A/G Ratio 0.9 (*)     All other components within normal limits   TYPE AND SCREEN    Narrative:     The following orders were created for panel order Type and screen.  Procedure                               Abnormality         Status                     ---------                               -----------         ------                     ABORH (Blood Type)[[id number]]                               Final result               Antibody Screen[[id number]]                                  Final result                 Please view results for these tests on the individual  orders.   ABORH (BLOOD TYPE)   ANTIBODY SCREEN   PREPARE RBC   RAINBOW DRAW BLUE     EKG    Rate, intervals and axes as noted on EKG Report.  Rate: 90  Rhythm: Sinus Rhythm  Reading: SR, SANTHOSH, nonspecific st change, no stemi. Qtc 428ms              Labs show anemia, and leukocytosis, leukocytosis -likely reactive to malignancy.  There is no fever or infectious symptoms.                  MDM     45-year-old male history as above presenting with acute on chronic rectal bleeding and known rectal malignancy.  On arrival he is initially hypotensive this improved with IV fluids and he was transfused 1 unit PRBC.  -Reviewed previous ER visits for similar  Patient declined admission or any other further workup would like to be discharged home.  - Plan for discharge after PRBC transfusion is finished      Medical Decision Making      Disposition and Plan     Clinical Impression:  1. Rectal bleeding    2. Symptomatic anemia         Disposition:  Discharge  5/26/2025  1:56 pm    Follow-up:  [facility] Emergency Department  [address]  [facility] [id number] [phone]  Follow up  As needed, If symptoms worsen    Deyvi Conn,   [address]  [facility] [id number] [phone]    Call            Medications Prescribed:  Current Discharge Medication List                Supplementary Documentation:

## 2025-05-26 NOTE — ED QUICK NOTES
RICO COMPLETED    Blood product infusing at this time  Patient resting comfortably at this time, provided drinks and crackers per patient request and er md approval.  Plan is for patient to be discharged home after blood infusion  Patient's friend called to updated poc.  BLS ambulance arranged for after blood product completion   Call light within reach    Patient continues to be on nibp cardiac and spo2 monitors. Side railsx2

## 2025-05-27 LAB
ATRIAL RATE: 90 BPM
BLOOD TYPE BARCODE: 600
P AXIS: 87 DEGREES
P-R INTERVAL: 118 MS
Q-T INTERVAL: 350 MS
QRS DURATION: 86 MS
QTC CALCULATION (BEZET): 428 MS
R AXIS: 88 DEGREES
T AXIS: 83 DEGREES
UNIT VOLUME: 350 ML
VENTRICULAR RATE: 90 BPM

## 2025-05-30 ENCOUNTER — TELEPHONE (OUTPATIENT)
Age: 45
End: 2025-05-30

## 2025-05-30 NOTE — TELEPHONE ENCOUNTER
Call attempted to patient to discuss rationale for follow up with Dr. Conn. Writer informed patient in message that we can set up a phone call with patient to talk to Dr. Conn the week of 6/9 to discuss what his goals are. If patient wishes to have his anemia and cancer treated in the outpatient setting, then in person appointments would be needed. If patient does not want to seek treatment in the outpatient setting and will be resorting to ER visits for PRN blood transfusions as he has been, then he does not need to be seen.

## 2025-05-30 NOTE — TELEPHONE ENCOUNTER
Spoke to the patient this morning and he stated that he soul not have to see Dr. Conn every 2 months, that is not good health care and he would like a call back to discuss this matter and he is asking for a zoom visit with Dr. Conn for his visit.

## 2025-05-31 ENCOUNTER — HOSPITAL ENCOUNTER (INPATIENT)
Facility: HOSPITAL | Age: 45
LOS: 8 days | Discharge: HOSPICE | End: 2025-06-08
Attending: EMERGENCY MEDICINE | Admitting: INTERNAL MEDICINE
Payer: MEDICAID

## 2025-05-31 PROBLEM — E87.1 HYPONATREMIA: Status: ACTIVE | Noted: 2025-05-31

## 2025-05-31 PROBLEM — E87.1 HYPONATREMIA: Status: ACTIVE | Noted: 2025-01-01

## 2025-05-31 PROBLEM — G89.3 CANCER ASSOCIATED PAIN: Status: ACTIVE | Noted: 2025-01-01

## 2025-05-31 PROBLEM — K62.5 RECTAL BLEEDING: Status: ACTIVE | Noted: 2025-05-31

## 2025-05-31 PROBLEM — K62.5 RECTAL BLEEDING: Status: ACTIVE | Noted: 2025-01-01

## 2025-05-31 PROBLEM — G89.3 CANCER ASSOCIATED PAIN: Status: ACTIVE | Noted: 2025-05-31

## 2025-05-31 NOTE — ED INITIAL ASSESSMENT (HPI)
Anal cancert, BRB from anus starting today. Pt is pale, cachectic, hypotensive, weak. Reports that he wishes to start palliative care.

## 2025-05-31 NOTE — ED PROVIDER NOTES
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  Patient states that he would like to be palliative care/DO NOT RESUSCITATE.  He states he would like to be comfortable and states \"I am at the end\".  The patient has declined treatment up until this point according to oncology notes.  He was here in the ER on , was found to be anemic and was transfused blood, but notes indicate that he later declined admission.      Objective:     Past Medical History:    Anal cancer (HCC)              History reviewed. No pertinent surgical history.             Social History     Socioeconomic History    Marital status: Single   Tobacco Use    Smoking status: Never    Smokeless tobacco: Never   Substance and Sexual Activity    Alcohol use: Never    Drug use: Never     Social Drivers of Health     Food Insecurity: Not on File (2024)    Received from [employer]    Food Insecurity     Food: 0   Transportation Needs: Not At Risk (2024)    Received from [facility]    Transportation Needs     In the past 12 months, has lack of reliable transportation kept you from medical appointments, meetings, work or from getting things needed for daily living? : No   Housing Stability: Not on File (2024)    Received from [employer]    Housing Stability     Housin                                Physical Exam    ED Triage Vitals   BP 25 1727 (!) 64/48   Pulse 25 1727 113   Resp 25 1727 18   Temp 25 1728 98.9 °F (37.2 °C)   Temp src 25 1728 Temporal   SpO2 25 1730 94 %   O2 Device 25 1730 None (Room air)       Current Vitals:   Vital Signs  BP: (!) 70/43 (md  aware)  Pulse: 73  Resp: 18  Temp: 97.9 °F (36.6 °C)  Temp src: Oral  MAP (mmHg): (!) 52    Oxygen Therapy  SpO2: 99 %  O2 Device: None (Room air)          Physical Exam  Vitals and nursing note reviewed.   Constitutional:       General: He is not in acute distress.     Appearance: Normal appearance. He is well-developed. He is cachectic. He is ill-appearing (chronic ill appearance). He is not toxic-appearing or diaphoretic.   HENT:      Head: Normocephalic and atraumatic.   Eyes:      Conjunctiva/sclera: Conjunctivae normal.      Pupils: Pupils are equal, round, and reactive to light.   Cardiovascular:      Rate and Rhythm: Normal rate and regular rhythm.      Pulses: Normal pulses.      Heart sounds: Normal heart sounds. No murmur heard.  Pulmonary:      Effort: Pulmonary effort is normal. No respiratory distress.      Breath sounds: Normal breath sounds. No wheezing.   Abdominal:      General: There is no distension.      Palpations: Abdomen is soft.      Tenderness: There is no abdominal tenderness. There is no guarding.   Musculoskeletal:         General: No tenderness or deformity. Normal range of motion.      Cervical back: Full passive range of motion without pain, normal range of motion and neck supple. No rigidity. Normal range of motion.      Right lower leg: No edema.      Left lower leg: No edema.   Skin:     General: Skin is warm and dry.      Coloration: Skin is pale.      Findings: No rash.   Neurological:      Mental Status: He is alert and oriented to person, place, and time.      GCS: GCS eye subscore is 4. GCS verbal subscore is 5. GCS motor subscore is 6.      Sensory: Sensation is intact. No sensory deficit.      Motor: Motor function is intact. No weakness.      Coordination: Coordination normal.   Psychiatric:         Attention and Perception: Attention normal.         Mood and Affect: Mood normal.         Behavior: Behavior normal. Behavior is cooperative.               ED Course  Labs  Reviewed   CBC WITH DIFFERENTIAL WITH PLATELET - Abnormal; Notable for the following components:       Result Value    WBC 34.4 (*)     RBC 3.29 (*)     HGB 8.0 (*)     HCT 27.5 (*)     MCH 24.3 (*)     MCHC 29.1 (*)     RDW-SD 55.2 (*)     RDW 18.3 (*)     .0 (*)     Neutrophil Absolute Prelim 29.30 (*)     Neutrophil Absolute 29.30 (*)     Monocyte Absolute 2.07 (*)     All other components within normal limits   BASIC METABOLIC PANEL (8) - Abnormal; Notable for the following components:    Glucose 105 (*)     Sodium 130 (*)     Calcium, Total 11.9 (*)     Calculated Osmolality 272 (*)     All other components within normal limits   SCAN SLIDE   TYPE AND SCREEN    Narrative:     The following orders were created for panel order Type and screen.  Procedure                               Abnormality         Status                     ---------                               -----------         ------                     ABORH (Blood Type)[[id number]]                               Final result               Antibody Screen[[id number]]                                  Final result                 Please view results for these tests on the individual orders.   ABORH (BLOOD TYPE)   ANTIBODY SCREEN                            MDM     Pulse Ox: 99%, Normal, RA      Prior notes reviewed: yes -oncology notes reviewed, indicated patient has been been diagnosed with this cancer since 2022 and has declined treatment      Chronic Medical Conditions Potentially Contributing: Squamous cell carcinoma of the anus        Medications   morphINE PF 2 MG/ML injection 4 mg (4 mg Intravenous Given 5/31/25 2333)   acetaminophen (Tylenol Extra Strength) tab 1,000 mg (has no administration in time range)   sodium chloride 0.9% infusion ( Intravenous New Bag 5/31/25 2334)   sodium chloride 0.9 % IV bolus 1,000 mL (0 mL Intravenous Stopped 5/31/25 1911)   morphINE PF 4 MG/ML injection 4 mg (4 mg Intravenous Given 5/31/25 1742)   morphINE PF  4 MG/ML injection 4 mg (4 mg Intravenous Given 5/31/25 1932)       I discussed with patient, he wishes to be kept comfortable, pain managed, would like to meet with palliative care.  He informs me that he has not ever been introduced to hospice and might be interested.  Patient declines any intensive treatment.  Patient confirms more than once that he would like to be DNR/DNI and does not want any resuscitation efforts.  Will admit, palliative care on consult.  WBC still elevated, suspect this is due to untreated cancer/skin erosion and bleeding. No other focus of infection including no cough/congestion, abd pain, vomiting, diarrhea, or rash.     D/w Dr Ontiveros - will admit  D/w Dr Gregory - will consult    Disposition and Plan     Clinical Impression:  1. Squamous cell cancer, anus (HCC)    2. Rectal bleeding    3. Cancer associated pain    4. Hyponatremia         Disposition:  Admit  5/31/2025  8:50 pm    Follow-up:  No follow-up provider specified.        Medications Prescribed:  Current Discharge Medication List                Supplementary Documentation:         Hospital Problems       Present on Admission  Date Reviewed: 3/13/2025          ICD-10-CM Noted POA    * (Principal) Squamous cell cancer, anus (HCC) C21.0 3/7/2025 Unknown    Cancer associated pain G89.3 5/31/2025 Unknown    Hyponatremia E87.1 5/31/2025 Unknown    Rectal bleeding K62.5 5/31/2025 Unknown

## 2025-06-01 NOTE — PROGRESS NOTES
Patient seen and examined  Patient tells me he pain is better with the Dilaudid.  Patient has not decided about hospice care he wants to think about it.  He wants to meet with the palliative care physician tomorrow.  Patient wants me to transfuse him blood at least 4 to 5 units to bring his hemoglobin up to 14 g.  Patient tells me that he went to [place] in the past and he brought blood transfusion and hemoglobin went up to 9 g and he was feeling much better.  And he also noticed that he had an erection.  After blood was transfused.  I explained to him that the I would transfuse him if his hemoglobin is less than 7.0 the goal would be to bring him up to 7.0 not to 14 g.  He did does not agree with that.  He tells me that he could get the blood from [email or web address] and his friends can donate him blood.  He also does not want me to repeat his CBC or basic metabolic panel now to see what his hemoglobin is he just wants to get a blood transfusion I explained to him again that the goal would be to keep him more than 7.0.  I was reviewing the previous oncology notes he had made the similar request to his oncologist and they have declined that also.

## 2025-06-01 NOTE — CM/SW NOTE
RODRIGO received for IP Hospice consult.    CM notified [facility] liaison Vivi of consult.  [facility] will be reaching out to patient/family to coordinate informational meeting.    / to remain available for support and/or discharge planning.     Katherine Hinson RN, BSN  Nurse   [phone]

## 2025-06-01 NOTE — ED QUICK NOTES
Plan of Care reviewed.   Provided coffee and warm blankets.  Bed is locked and in lowest position. Call light within reach.

## 2025-06-01 NOTE — PROGRESS NOTES
[facility]  part of [facility]    Progress Note    Joseph Angel Patient Status:  Inpatient    1980 MRN [id number]   Location [facility] 4W/SW/SE Attending Lane Ontiveros MD   Hosp Day # 1 PCP None Pcp       SUBJECTIVE:  Complains of anal pain.  Pain is under control with Dilaudid  The patient wants a blood transfusion to hold for greater than 7.0.  Hemoglobin of at least 13 to 14 mg    OBJECTIVE:  Vital signs in last 24 hours:  BP (!) 82/40 (BP Location: Right arm)   Pulse 73   Temp 97.9 °F (36.6 °C) (Oral)   Resp 18   Ht 5' 9\" (1.753 m)   Wt 87 lb (39.5 kg)   SpO2 99%   BMI 12.85 kg/m²     Intake/Output:    Intake/Output Summary (Last 24 hours) at 2025 0729  Last data filed at 2025 0121  Gross per 24 hour   Intake 148.02 ml   Output --   Net 148.02 ml       Wt Readings from Last 3 Encounters:   25 87 lb (39.5 kg)   25 88 lb 2.9 oz (40 kg)   03/10/25 90 lb (40.8 kg)       Exam  Gen: No acute distress, alert and oriented x3,  HEENT: Pallor is noted  Pulm: Lungs clear, normal respiratory effort  CV: Heart with regular rate and rhythm, no peripheral edema  Abd: Abdomen soft, nontender, nondistended, no organomegaly, bowel sounds present  MSK: Full range of motion in extremities, no clubbing, no cyanosis  Skin: no rashes or lesions  CNS: Alert    Data Review:     Labs:   Lab Results   Component Value Date    WBC 34.4 2025    HGB 8.0 2025    HCT 27.5 2025    .0 2025    CREATSERUM 0.85 2025    BUN 17 2025     2025    K 4.4 2025     2025    CO2 24.0 2025     2025    CA 11.9 2025       LABS  Recent Labs   Lab 25  1123 25  1736   RBC 3.13* 3.29*   HGB 7.0* 8.0*   HCT 25.4* 27.5*   MCV 81.2 83.6   MCH 22.4* 24.3*   MCHC 27.6* 29.1*   RDW 18.9* 18.3*   NEPRELIM 21.48* 29.30*   WBC 24.6* 34.4*   .0* 483.0*   AST <8  --    ALT <7*  --    *  105*       Imaging:      Meds:   Current Hospital Medications[1]    Assessment  Problem List[2]    Plan:     Continue pain control.    I had a lengthy discussion with the patient and advised him that RBC transfusion to 13 to 14 g is not recommended.  I recommended that can do blood test if the hemoglobin less than 7 we can transfuse him 1 unit of packed red blood cells    The patient is refusing blood draw.        Active Orders   Microbiology    Clostridium difficile(toxigenic)PCR     Frequency: Once     Number of Occurrences: 1 Occurrences   Diet    Regular/General diet Is Patient on Accuchecks? No     Frequency: Effective Now     Number of Occurrences: Until Specified   Code Status    DNAR/Comfort care Continuous     Frequency: Continuous     Number of Occurrences: Until Specified     Order Comments: Primary goal of maximizing comfort. Relieve pain & suffering through the use of medication by any route as needed; use oxygen, suctioning & manual treatment of airway obstruction.       Consult    Consult Palliative Care     Frequency: Once     Number of Occurrences: 1 Occurrences    ED Consult to Oncology     Frequency: Once     Number of Occurrences: 1 Occurrences   Isolation    Enteric/Contact PLUS Isolation Continuous     Frequency: Continuous     Number of Occurrences: Until Specified   Medications    acetaminophen (Tylenol Extra Strength) tab 1,000 mg     Frequency: Q6H PRN     Dose: 1,000 mg     Route: Oral    HYDROmorphone (Dilaudid) 1 MG/ML injection 0.2 mg     Linked Order: Or     Frequency: Q2H PRN     Dose: 0.2 mg     Route: Intravenous    HYDROmorphone (Dilaudid) 1 MG/ML injection 0.4 mg     Linked Order: Or     Frequency: Q2H PRN     Dose: 0.4 mg     Route: Intravenous    HYDROmorphone (Dilaudid) 1 MG/ML injection 0.8 mg     Linked Order: Or     Frequency: Q2H PRN     Dose: 0.8 mg     Route: Intravenous    LORazepam (Ativan) 2 mg/mL injection 1 mg     Frequency: Q6H PRN     Dose: 1 mg     Route:  Intravenous    morphINE PF 4 MG/ML injection 4 mg     Frequency: Q2H PRN     Dose: 4 mg     Route: Intravenous    sodium chloride 0.9% infusion     Frequency: Continuous     Route: Intravenous       Lane Ontiveros MD  6/1/2025           [1]   Current Facility-Administered Medications   Medication Dose Route Frequency    HYDROmorphone (Dilaudid) 1 MG/ML injection 0.2 mg  0.2 mg Intravenous Q2H PRN    Or    HYDROmorphone (Dilaudid) 1 MG/ML injection 0.4 mg  0.4 mg Intravenous Q2H PRN    Or    HYDROmorphone (Dilaudid) 1 MG/ML injection 0.8 mg  0.8 mg Intravenous Q2H PRN    LORazepam (Ativan) 2 mg/mL injection 1 mg  1 mg Intravenous Q6H PRN    morphINE PF 4 MG/ML injection 4 mg  4 mg Intravenous Q2H PRN    acetaminophen (Tylenol Extra Strength) tab 1,000 mg  1,000 mg Oral Q6H PRN    sodium chloride 0.9% infusion   Intravenous Continuous   [2]   Patient Active Problem List  Diagnosis    Squamous cell cancer, anus (HCC)    Iron deficiency anemia due to chronic blood loss    Rectal bleeding    Cancer associated pain    Hyponatremia

## 2025-06-01 NOTE — HOSPICE RN NOTE
Referral received for Hospice consult. Will meet with patient to discuss Hospice benefits and levels of care today.        Vivi Car RN BSN  [facility] RN Liaison  [phone] (After-hours)

## 2025-06-01 NOTE — ED QUICK NOTES
Orders for admission, patient is aware of plan and ready to go upstairs. Any questions, please call ED RN yonatan at extension [phone].     Patient Covid vaccination status: Unvaccinated     COVID Test Ordered in ED: None    COVID Suspicion at Admission: N/A    Running Infusions: Medication Infusions[1] None    Mental Status/LOC at time of transport: 4    Other pertinent information:   CIWA score: N/A   NIH score:  N/A             [1]

## 2025-06-01 NOTE — PLAN OF CARE
Patient admitted from the ED. Pain is managed with morphine and dilaudid prn. IVF infusing. Voids with urinal. Bear hugger in place. Palliative and onc on consult. Safety precautions in place.     Problem: Patient Centered Care  Goal: Patient preferences are identified and integrated in the patient's plan of care  Description: Interventions:  - What would you like us to know as we care for you?   - Provide timely, complete, and accurate information to patient/family  - Incorporate patient and family knowledge, values, beliefs, and cultural backgrounds into the planning and delivery of care  - Encourage patient/family to participate in care and decision-making at the level they choose  - Honor patient and family perspectives and choices  Outcome: Progressing     Problem: Patient/Family Goals  Goal: Patient/Family Long Term Goal  Description: Patient's Long Term Goal:     Interventions:  - See additional Care Plan goals for specific interventions  Outcome: Progressing

## 2025-06-01 NOTE — H&P
[facility]  part of [facility]    History & Physical    Joseph Angel Patient Status:  Inpatient    1980 MRN [id number]   Location [facility] 4W/SW/SE Attending Lane Ontiveros MD   Hosp Day # 0 PCP None Pcp     Date:  2025  Date of Admission:  2025    History provided by:patient  Chief Complaint:     Chief Complaint   Patient presents with    Anal Problem       HPI:   Joseph Angel is a(n) 45 year old male.  With squamous cell anal cancer, he has a known history of anal cancer dating back to .  Patient has refused the treatment on multiple occasion patient came to the emergency room because of the increased pain and also patient was having some bleeding.  Patient wants to be DNR comfort care.  Patient tells me that he has lost a lot of weight and he is at the end.  He does not want a chemotherapy or radiation.  He also does not want any surgery because patient tells me that he had seen Dr. Beverly in the past and he did not like what he has to say he does not want any colostomy.  Previous oncology notes were reviewed    History   Past Medical History[1]  Past Surgical History[2]  Family History[3]  Social History:  Short Social Hx on File[4]  Allergies/Medications:   Allergies: Allergies[5]  Prescriptions Prior to Admission[6]    Review of Systems:   Pertinent items are noted in HPI.    Physical Exam:   Vital Signs:  Blood pressure (!) 70/43, pulse 73, temperature 97.9 °F (36.6 °C), temperature source Oral, resp. rate 18, height 5' 9\" (1.753 m), weight 87 lb (39.5 kg), SpO2 99%.     45-year-old cachectic male resting in the bed he is in distress secondary to pain.  HEENT: Pallor is noted.  Neck no JVD no carotid bruit.  Heart S1 and S2 regular in rate and rhythm.  The lungs are clear to auscultation  Abdominal soft bowel sounds are present  Extremities no pedal edema.  CNS examination patient is alert no focal deficit.  Anal examination patient has a necrotic mass is  noted.        Results:     Lab Results   Component Value Date    WBC 34.4 (H) 05/31/2025    HGB 8.0 (L) 05/31/2025    HCT 27.5 (L) 05/31/2025    .0 (H) 05/31/2025    CREATSERUM 0.85 05/31/2025    BUN 17 05/31/2025     (L) 05/31/2025    K 4.4 05/31/2025     05/31/2025    CO2 24.0 05/31/2025     (H) 05/31/2025    CA 11.9 (H) 05/31/2025    ALB 3.3 05/26/2025    ALKPHO 108 05/26/2025    BILT 0.3 05/26/2025    TP 6.9 05/26/2025    AST <8 05/26/2025    ALT <7 (L) 05/26/2025    PTT 28.8 10/28/2024    INR 1.05 03/10/2025       No results found.        Assessment/Plan:     Squamous cell cancer, anus (HCC)  With metastasis.  Patient has refused chemotherapy radiation therapy as well as surgical resection in the past.  He wants comfort care now.  Wants to be DNR.  Patient tells me that he is at the end      Rectal bleeding  Secondary to anal cancer.  His hemoglobin is 8.0.      Cancer associated pain  Continue the patient on morphine      Hyponatremia  Most likely related to his cancer.    Severe protein calorie malnutrition from malignancy.        Active Orders   Diet    Regular/General diet Is Patient on Accuchecks? No     Frequency: Effective Now     Number of Occurrences: Until Specified   Nursing    Vital signs     Frequency: Per Unit Routine     Number of Occurrences: 2 Hours     Order Comments: ED holding order only  Cancel if other admission orders exist!       Code Status    DNAR/Comfort care Continuous     Frequency: Continuous     Number of Occurrences: Until Specified     Order Comments: Primary goal of maximizing comfort. Relieve pain & suffering through the use of medication by any route as needed; use oxygen, suctioning & manual treatment of airway obstruction.       Consult    Consult Palliative Care     Frequency: Once     Number of Occurrences: 1 Occurrences    ED Consult to Oncology     Frequency: Once     Number of Occurrences: 1 Occurrences   Medications    acetaminophen (Tylenol  Extra Strength) tab 1,000 mg     Frequency: Q6H PRN     Dose: 1,000 mg     Route: Oral    morphINE PF 2 MG/ML injection 4 mg     Frequency: Q2H PRN     Dose: 4 mg     Route: Intravenous    sodium chloride 0.9% infusion     Frequency: Continuous     Route: Intravenous     Discussed with the ER physician discussed with the nursing staff.  Reviewed personally: current medical record, vital signs info, lab results, cardiac & radiographic films and reports.  Formulated plans for continued care for this patient.  RN to call us for any significant change  Scheduled tests: see orders   Other orders per treatment team.     *The above components were done for the patient encounter: Reviewing the patient's electronic chart, reviewing results, obtaining a medical history, counseling patient and/or family member, ordering medications, tests, or procedures, documenting clinical information in the electronic health record, refer to or communicate with other health care professionals as indicated, independently interpret results and providing care coordination      Lane Ontiveros MD  5/31/2025          [1]   Past Medical History:   Anal cancer (HCC)   [2] History reviewed. No pertinent surgical history.  [3]   Family History  Problem Relation Age of Onset    Other (Hx of breast cancer) Mother    [4]   Social History  Socioeconomic History    Marital status: Single   Tobacco Use    Smoking status: Never    Smokeless tobacco: Never   Substance and Sexual Activity    Alcohol use: Never    Drug use: Never     Social Drivers of Health     Food Insecurity: Not on File (9/26/2024)    Received from [employer]    Food Insecurity     Food: 0   Transportation Needs: Not At Risk (8/1/2024)    Received from [facility]    Transportation Needs     In the past 12 months, has lack of reliable transportation kept you from medical appointments, meetings, work or from getting things needed for daily living? : No   Housing Stability: Not on  File (2024)    Received from [facility]     Housin   [5]   Allergies  Allergen Reactions    Shellfish Allergy HIVES    Shrimp RASH   [6]   Medications Prior to Admission   Medication Sig    acetaminophen 160 MG/5ML Oral Solution Take 500-1,000 mg by mouth every 6 (six) hours as needed.    Naproxen Sodium (ALEVE) 220 MG Oral Cap Take 220 mg by mouth as needed.    MEBENDAZOLE OR Take 222 mg by mouth in the morning and 222 mg before bedtime.

## 2025-06-01 NOTE — HOSPICE RN NOTE
[facility] RN met with Joseph to discuss comfort care. Informational meeting complete. Hospice philosophy, Medicare benefit, and levels of care discussed. All questions answered. Joseph would like to wait and talk to his doctors. [facility] will follow to support the patient and family.     Vivi Car RN BSN  [facility] RN Liaison  [phone] (After-hours)

## 2025-06-01 NOTE — CONSULTS
Hematology/Oncology Initial Consultation Note    Patient Name: Joseph Angel  Medical Record Number: [id number]    YOB: 1980   Date of Consultation: 6/1/2025   PCP: None Pcp   Other providers:      Reason for Consultation:  Joseph Angel was seen today for the diagnosis of Anal cancer    Oncologic History:    ADAPTED from Dr. Conn    Diagnosis: anal SCC  Date of diagnosis: April 19, 2023     Initial presentation: painful anal mass first noted by patient around end of 2021, pt thought was 2/2 animal bite but progressively grew and caused difficulty voiding. Noted intermittent anal spotting beginning Aug 2022  April 2023 CEA 3.1  Aug 2024 HIV nonreactive     4/19/23 CT AP ([facility]): 1. Internal verge nodular hyperdense/enhancing soft tissue, possibly  corresponding to patient's reported primary neoplasm.  2. No evidence of metastatic disease.      4/20/23 CT chest ([facility]): 1. Small left major fissure pleural base nodule, likely benign given  katelynn-fissural location.  2. Otherwise, no evidence of metastatic disease in the chest.      (Was recommended colonoscopy, biopsy, further workup but patient declined after extensive review of risks)     8/3/24 MRI abd ([facility]): 1. Large lesion extending inferiorly from the perineum measures over an  area of 7.1 x 4.4 cm, with some variable enhancement. Findings likely  relate to the known mass, however a degree of superimposed rectal prolapse  is difficult to exclude in the background. PET scan may be considered to  assess extent of uptake.  2. Portions of the lesion are difficult to differentiate from the posterior  aspect of the prostate gland. There is also diffuse ill-defined stranding  and enhancement in the perirectal fat.  3. There may be some smaller peripherally enhancing collections in the  pelvic sidewalls bilaterally, measuring up to 2.2 x 0.8 cm in size on the  right. This could relate to superimposed abscesses, though too  small to  drain.  4. No metastatic disease to the soft tissues of the abdomen within the  confines of a limited exam.  5. Moderate to heavy colonic stool load. No dilated bowel loops to indicate  mechanical obstruction are seen.  6. Additional background chronic appearing findings as described above.      8/2/25 Path ([facility]): Invasive, moderately differentiated squamous cell carcinoma     Immunostain for p16 demonstrates block positivity in the carcinoma, consistent with a HPV driven process.  Case subjected to intradepartmental QA review.     Left AMA, lost to f/u until 10/2024 when presented to [facility] ED for anal bleeding, hgb 6.2, given 2 units pRBC and d/c'd - followed up with ELBERT Barnes, Feb 2025 for anemia - notes he is in \"palliative mode\" possibly wants to be seen for palliative transfusions    ===================================================  History of Present Illness:      45-year-old male with squamous cell carcinoma presented to the ER with rectal bleeding and uncontrolled pain.  Patient was seen in the ER recently for anemia and had blood transfusion..  On presentation, he was noted to have an hemoglobin of 8, with WBC of 34.4.  Patient denies having any fevers or chills.No localizing symptoms.  Patient tells that he is too tired to have a discussion with me currently.  However he is interested in getting blood transfusion to improve his hemoglobin up to 14.  And also would like to be considered for surgical resection of his tumor.  He had informational meeting with hospice      Past Medical History:  Past Medical History[1]    Past Surgical History[2]    Home Medications:  [unfilled]  -------  Medications Ordered Prior to Encounter[3]    Allergies:   Allergies[4]    Psychosocial History:  Social History     Social History Narrative    Not on file     Short Social Hx on File[5]    Family Medical History:  Family History[6]    Review of Systems:  A 10-point ROS was done with  pertinent positives and negative per the HPI    Vital Signs:  Height: 175.3 cm (5' 9\") (05/31 1727)  Weight: 39.5 kg (87 lb) (05/31 2306)  BSA (Calculated - sq m): 1.45 sq meters (05/31 1727)  Pulse: 110 (06/01 1142)  BP: 87/43 (06/01 1142)  Temp: 97.7 °F (36.5 °C) (06/01 1142)  Do Not Use - Resp Rate: --  SpO2: 99 % (06/01 1142)    Wt Readings from Last 6 Encounters:   05/31/25 39.5 kg (87 lb)   05/26/25 40 kg (88 lb 2.9 oz)   03/10/25 40.8 kg (90 lb)   03/07/25 40.8 kg (90 lb)   08/12/24 47.6 kg (105 lb)       ECOG PS: 2-3    Physical Examination:  General: Patient is alert and oriented,     CV: Regular rate and rhythm  Respiratory:Bilateral air entry+  Neurological: Grossly intact   Skin: no rashes or petechiae      Laboratory:  Lab Results   Component Value Date    WBC 34.4 (H) 05/31/2025    WBC 24.6 (H) 05/26/2025    WBC 22.5 (H) 03/10/2025    HGB 8.0 (L) 05/31/2025    HGB 7.0 (L) 05/26/2025    HGB 6.4 (LL) 03/10/2025    HCT 27.5 (L) 05/31/2025    MCV 83.6 05/31/2025    MCH 24.3 (L) 05/31/2025    MCHC 29.1 (L) 05/31/2025    RDW 18.3 (H) 05/31/2025    .0 (H) 05/31/2025    .0 (H) 05/26/2025    .0 (H) 03/10/2025     Lab Results   Component Value Date     (H) 05/31/2025    BUN 17 05/31/2025    BUNCREA 20.0 05/31/2025    CREATSERUM 0.85 05/31/2025    CREATSERUM 0.89 05/26/2025    CREATSERUM 0.50 (L) 03/10/2025    ANIONGAP 6 05/31/2025    CA 11.9 (H) 05/31/2025    OSMOCALC 272 (L) 05/31/2025    ALKPHO 108 05/26/2025    AST <8 05/26/2025    ALT <7 (L) 05/26/2025    BILT 0.3 05/26/2025    TP 6.9 05/26/2025    ALB 3.3 05/26/2025    GLOBULIN 3.6 (H) 05/26/2025     (L) 05/31/2025    K 4.4 05/31/2025     05/31/2025    CO2 24.0 05/31/2025     Lab Results   Component Value Date    PTT 28.8 10/28/2024    INR 1.05 03/10/2025       Imaging:    NA    Impression & Plan:     45-year-old male with squamous cell carcinoma of the anus.    Anus squamous cell carcinoma  Declined treatment in the  past.  Also was not interested in additional imaging.  Mass was unresectable and palliative resection was not recommended.  Patient wants me to read up on autologous blood donation however wants me to consider transfusing him with a target threshold of 10-14.  Discussed with the patient that we do not recommend blood transfusion to target a hemoglobin of 10.  We could consider IV iron to help improve his hemoglobin     Patient is currently not interested in discussing about any of his treatment options.    - supportive management of pain    Anemia  - likely secondary to blood loss.     Dr. Conn will be rounding tomorrow.     Zee Gregory MD  [facility]            This note was prepared using Dragon Medical voice recognition dictation software and as a result, errors may occur. When identified, these errors have been corrected. While every attempt is made to correct errors during dictation, discrepancies may still exist          [1]   Past Medical History:   Anal cancer (HCC)   [2] History reviewed. No pertinent surgical history.  [3]   Current Facility-Administered Medications on File Prior to Encounter   Medication Dose Route Frequency Provider Last Rate Last Admin    [COMPLETED] sodium chloride 0.9 % IV bolus 1,000 mL  1,000 mL Intravenous Once Jeff Townsend MD   Stopped at 05/26/25 1229    [COMPLETED] HYDROcodone-acetaminophen (Norco) 5-325 MG per tab 1 tablet  1 tablet Oral Once Jeff Townsend MD   1 tablet at 05/26/25 1435     Current Outpatient Medications on File Prior to Encounter   Medication Sig Dispense Refill    acetaminophen 160 MG/5ML Oral Solution Take 500-1,000 mg by mouth every 6 (six) hours as needed.      Naproxen Sodium (ALEVE) 220 MG Oral Cap Take 220 mg by mouth as needed.      MEBENDAZOLE OR Take 222 mg by mouth in the morning and 222 mg before bedtime.     [4]   Allergies  Allergen Reactions    Shellfish Allergy HIVES    Shrimp RASH    [5]   Social History  Socioeconomic History    Marital status: Single   Tobacco Use    Smoking status: Never    Smokeless tobacco: Never   Substance and Sexual Activity    Alcohol use: Never    Drug use: Never     Social Drivers of Health     Food Insecurity: No Food Insecurity (6/1/2025)    NCSS - Food Insecurity     Worried About Running Out of Food in the Last Year: No     Ran Out of Food in the Last Year: No   Transportation Needs: No Transportation Needs (6/1/2025)    NCSS - Transportation     Lack of Transportation: No   Housing Stability: Not At Risk (6/1/2025)    NCSS - Housing/Utilities     Has Housing: Yes     Worried About Losing Housing: No     Unable to Get Utilities: No   [6]   Family History  Problem Relation Age of Onset    Other (Hx of breast cancer) Mother

## 2025-06-01 NOTE — PLAN OF CARE
INES called and Joseph stated okay to give her information and send POA documents. Left side lying with support and morphine for pain management. Ice and juice tolerated. Tanesha hugger for warmth.     POA document received and on chart; orders received.     Has a 5 pound service dog Bandar and requests his dog to be able to come visit him if possible; TL informed.      C/o itchiness to skin intermittently.     Problem: Patient Centered Care  Goal: Patient preferences are identified and integrated in the patient's plan of care  Description: Interventions:  - What would you like us to know as we care for you? From home   - Provide timely, complete, and accurate information to patient/family  - Incorporate patient and family knowledge, values, beliefs, and cultural backgrounds into the planning and delivery of care  - Encourage patient/family to participate in care and decision-making at the level they choose  - Honor patient and family perspectives and choices  Outcome: Progressing     Problem: Patient/Family Goals  Goal: Patient/Family Long Term Goal  Description: Patient's Long Term Goal: TBD after speaking with MDs    Interventions:  - Orders obtained  - See additional Care Plan goals for specific interventions  Outcome: Progressing  Goal: Patient/Family Short Term Goal  Description: Patient's Short Term Goal: pain <5/10    Interventions:   - medicated with IV morphine and slight repositioning PRN  - See additional Care Plan goals for specific interventions  Outcome: Progressing

## 2025-06-02 PROBLEM — Z51.5 PALLIATIVE CARE BY SPECIALIST: Status: ACTIVE | Noted: 2025-01-01

## 2025-06-02 PROBLEM — Z51.5 PALLIATIVE CARE BY SPECIALIST: Status: ACTIVE | Noted: 2025-06-02

## 2025-06-02 NOTE — DIETARY NOTE
ADULT NUTRITION INITIAL ASSESSMENT      Pt is at high nutrition risk.  Pt meets severe malnutrition criteria.      RECOMMENDATIONS TO MD: See nutrition intervention for ONS (oral nutrition supplements)    ADMITTING DIAGNOSIS:  Hyponatremia [E87.1]  Rectal bleeding [K62.5]  Cancer associated pain [G89.3]  Squamous cell cancer, anus (HCC) [C21.0]  PERTINENT PAST MEDICAL HISTORY: Past Medical History[1]    PATIENT STATUS: Initial 06/02/25:  Patient (pt) screened at Nutrition risk due to poor po and unintentional wt loss by nursing upon admission. RD identified Severe Underweight based on BMI 12.8 kg/m2.  Diagnosis & PMH above including h/o HIV non reactive.  Other Medical findings:  Per MD notes: Mass unresectable. Palliative Care services following,: pt was interested in Hospice but needed to know 2 medical treatment per notes.   Upon visit, pt was engaging, reports fair appetite except foods were not delivered hot/warm. States could have eaten more if they were not cold. Diet hx/eval:     followed Vegan diet x 24 years or so but reported that he has to let go of this restriction given limited control over his diet when at other places such as hospital, friend's place, etc. Pt reports taking Janelle efw-suhl ONS since introduced in Aug 2024 hospital admit. Wt eval:    40# or 32% severe wt loss in the past 15 months based on 125# wt in Feb 2024, compared to admit  wt of 85#. Nutrition findings:    suspect Cancer Cachexia, modified diet limiting food choices,  Will add  Oral Nutrition Supplement ( ONS ) for now ( [employer]  BID)  to maximize po.  Further nutrition plans pending [facility] final decisions . Code status noted.    FOOD/NUTRITION:  Appetite: Fair  Current meal intake:     25% intake at bfast---unable to finish meals d/t meals were served cold.   Intake Meeting Needs:  No, but oral nutrition supplements (ONS) to maximize    Percent Meals Eaten (last 6 days)       None           Food Allergies: Shrimp--however, pt  verbalized no allergy to shrimps. D/w with RN to clarify with pt and remove if appropriate.   Cultural/Ethnic/Mosque Preferences: None    GASTROINTESTINAL: no BM recorded yet.     MEDICATIONS: reviewed    Medication Infusions[2]   Scheduled Medications[3]  LABS: noted  Hyponatremia and hypokalemia. Two K replacement to be given this pm  Recent Labs     05/31/25  1736 06/02/25  0643   * 94   BUN 17 11   CREATSERUM 0.85 0.66*   CA 11.9* 9.2   * 132*   K 4.4 3.1*    104   CO2 24.0 21.0   OSMOCALC 272* 273*       WEIGHT HISTORY:    Patient Weight(s) for the past 336 hrs:   Weight   05/31/25 2306 39.5 kg (87 lb)   05/31/25 1727 39.5 kg (87 lb)     Wt Readings from Last 10 Encounters:   05/31/25 39.5 kg (87 lb)   05/26/25 40 kg (88 lb 2.9 oz)   03/10/25 40.8 kg (90 lb)   03/07/25 40.8 kg (90 lb)   08/12/24 47.6 kg (105 lb)       ANTHROPOMETRICS:  HT: 175.3 cm (5' 9\")  Wt Readings from Last 1 Encounters:   05/31/25 39.5 kg (87 lb)      Last weight: Likely accurate  BMI: Body mass index is 12.85 kg/m².  Dosing Weight: 39.5 kg - admission weight, utilized for anthropometric calculations  BMI CLASSIFICATION: less than 18.5 kg/m2 - underweight  IBW/lbs (Calculated) Male: 160 lbs                         54% IBW  Usual Body Wt: 125 #  in Feb 2024, 108# in  July 2024 per care everywhere      68% UBW      NUTRITION RELATED PHYSICAL FINDINGS:  - Nutrition Focused Physical Exam (NFPE): severe depletion body fat orbital region and triceps region and severe depletion muscle mass temple region, clavicle region, scapular region, shoulder region, and dorsal arnold region, unable to assess Lower extremities as pt on Tanesha Hugger  - Fluid Accumulation: non-pitting Left Foot , +1 Left Lower extremity, and +2 Right Foot . See RN documentation for details  - Skin Integrity: at risk. See RN documentation for details      CRITERIA FOR MALNUTRITION DIAGNOSIS:   Criteria for severe malnutrition diagnosis: chronic illness  related to wt loss greater than 20% in 1 year, body fat severe depletion, and muscle mass severe depletion.    NUTRITION DIAGNOSIS/PROBLEM:   Severe Malnutrition related to Chronic - Physiological causes increasing nutrient needs due to illness or condition in the setting of Cancer as evidenced by 32% severe wt loss in the past 15 months, severe Muscle mass loss and Adipose tissue depletion, suspect Cancer cachexia, and chronic Underweight status/Low BMI of 12.8 kg/m2.     NUTRITION INTERVENTION:     NUTRITION PRESCRIPTION:   Estimated Nutrition needs: --dosing wt of 39.5 kg - wt taken on 5/31  Calories: 1600+ calories/day (40+ calories per kg Dosing wt)  Protein: 59-79 g protein/day (1.5-2.0 g protein/kg Dosing wt)    - Diet:       Procedures    Regular/General diet Texture Consistency: Chopped / Soft & Bite Sized; Is Patient on Accuchecks? No       - Nutrition Care Plan: Encouraged increased PO intake, Initiated ONS (oral nutritional supplements), and Ordered thiamin  - ONS (Oral Nutrition Supplements)/Meals/Snacks: [employer] (325 calories and 16 g protein each) BID Vanilla or Chocolate   - Vitamin and mineral supplements: thiamin/ RD  - Feeding assistance: meal set up  - Nutrition education: Discussed importance of overall consistent increase nutrition intake to limit wt loss  - Coordination of nutrition care: collaboration with other providers  - Discharge and transfer of nutrition care to new setting or provider: monitor plans      MONITOR AND EVALUATE/NUTRITION GOALS:  - Food and Nutrient Intake:    Monitor: adequacy of PO intake and adequacy of supplement intake  - Food and Nutrient Administration:   Monitor: goc/family wishes regarding nutrition  - Anthropometric Measurement:    Monitor weight  - Nutrition Goals:    allow wt loss due to fluid losses, regain wt as able, PO and supplement greater than 75% of needs, minimize lean body mass loss, prevent skin breakdown, support body systems, and halt true wt  loss      RD to follow up    Heavenly Luz RD, LDN, [facility]  Clinical Dietitian  [phone]               [1]   Past Medical History:   Anal cancer (HCC)   [2]    sodium chloride 83 mL/hr at 06/01/25 2103   [3]    methadone  2.5 mg Oral BID    lactulose  10 g Oral BID    hydrOXYzine HCl  10 mg Oral TID    potassium chloride  40 mEq Intravenous Once    Followed by    potassium chloride  20 mEq Intravenous Once

## 2025-06-02 NOTE — PLAN OF CARE
Pt is alert and oriented on RA. Pt is voiding via urinal. Pt given shower over night. Call light is within reach.    Problem: GASTROINTESTINAL - ADULT  Goal: Maintains or returns to baseline bowel function  Description: INTERVENTIONS:  - Assess bowel function  - Maintain adequate hydration with IV or PO as ordered and tolerated  - Evaluate effectiveness of GI medications  - Encourage mobilization and activity  - Obtain nutritional consult as needed  - Establish a toileting routine/schedule  - Consider collaborating with pharmacy to review patient's medication profile  Outcome: Progressing  Goal: Maintains adequate nutritional intake (undernourished)  Description: INTERVENTIONS:  - Monitor percentage of each meal consumed  - Identify factors contributing to decreased intake, treat as appropriate  - Assist with meals as needed  - Monitor I&O, WT and lab values  - Obtain nutritional consult as needed  - Optimize oral hygiene and moisture  - Encourage food from home; allow for food preferences  - Enhance eating environment  Outcome: Progressing     Problem: Impaired Functional Mobility  Goal: Achieve highest/safest level of mobility/gait  Description: Interventions:  - Assess patient's functional ability and stability  - Promote increasing activity/tolerance for mobility and gait  - Educate and engage patient/family in tolerated activity level and precautions  - Recommend use of  RW for transfers and ambulation  Outcome: Progressing

## 2025-06-02 NOTE — PLAN OF CARE
Assumed care of patient at 0330.  He was given dilaudid at 0545 for pain with good relief.  Attempted to place air pump on mattress to help prevent skin breakdown, but mattress not compatible with pump - will attempt to switch out mattress next time patient is out of bed.  RN requested to see patient's skin, including wound on rectum, but he declines to allow skin assessment.  There is a risk for patient developing pressure sore on left hip due to extreme weight loss and patient's preference for lying on left side; he is aware but declines to reposition or allow staff to assess skin and place foam dressings.  Bed alarm in place, he remains safe in environment at this time.

## 2025-06-02 NOTE — PROGRESS NOTES
[facility]  part of [facility]    Progress Note    Joseph Angel Patient Status:  Inpatient    1980 MRN [id number]   Location [facility] 4W/SW/SE Attending Lane Ontiveros MD   Hosp Day # 2 PCP None Pcp       SUBJECTIVE:  Complains of anal pain.  Pain is under control with Dilaudid    OBJECTIVE:  Vital signs in last 24 hours:  BP (!) 79/41 (BP Location: Right arm)   Pulse 102   Temp 99.5 °F (37.5 °C) (Oral)   Resp 16   Ht 5' 9\" (1.753 m)   Wt 87 lb (39.5 kg)   SpO2 99%   BMI 12.85 kg/m²     Intake/Output:    Intake/Output Summary (Last 24 hours) at 2025 0716  Last data filed at 2025 0600  Gross per 24 hour   Intake 2672 ml   Output 1300 ml   Net 1372 ml       Wt Readings from Last 3 Encounters:   25 87 lb (39.5 kg)   25 88 lb 2.9 oz (40 kg)   03/10/25 90 lb (40.8 kg)       Exam  Gen: No acute distress, alert and oriented x3,  HEENT: Pallor is noted  Pulm: Lungs clear, normal respiratory effort  CV: Heart with regular rate and rhythm, no peripheral edema  Abd: Abdomen soft, nontender, nondistended, no organomegaly, bowel sounds present  MSK: Full range of motion in extremities, no clubbing, no cyanosis  Skin: no rashes or lesions  CNS: Alert    Data Review:     Labs:          LABS  Recent Labs   Lab 25  1123 25  1736   RBC 3.13* 3.29*   HGB 7.0* 8.0*   HCT 25.4* 27.5*   MCV 81.2 83.6   MCH 22.4* 24.3*   MCHC 27.6* 29.1*   RDW 18.9* 18.3*   NEPRELIM 21.48* 29.30*   WBC 24.6* 34.4*   .0* 483.0*   AST <8  --    ALT <7*  --    * 105*       Imaging:      Meds:   Current Hospital Medications[1]    Assessment  Problem List[2]  Acute on chronic blood loss anemia  Plan:   Will transfuse 1 unit of packed red blood cells and hemoglobin is 6.0.  Consulted colorectal surgery  Continue pain control.    I had a lengthy discussion with the patient and advised him that RBC transfusion to 13 to 14 g is not recommended.  I recommended that can  do blood test if the hemoglobin less than 7 we can transfuse him 1 unit of packed red blood cells    The patient is refusing blood draw.        Active Orders   Microbiology    Clostridium difficile(toxigenic)PCR     Frequency: Once     Number of Occurrences: 1 Occurrences   Diet    Regular/General diet Texture Consistency: Chopped / Soft & Bite Sized; Is Patient on Accuchecks? No     Frequency: Effective Now     Number of Occurrences: Until Specified   Code Status    DNAR/Comfort care Continuous     Frequency: Continuous     Number of Occurrences: Until Specified     Order Comments: Primary goal of maximizing comfort. Relieve pain & suffering through the use of medication by any route as needed; use oxygen, suctioning & manual treatment of airway obstruction.       Consult    Consult Palliative Care     Frequency: Once     Number of Occurrences: 1 Occurrences   Isolation    Enteric/Contact PLUS Isolation Continuous     Frequency: Continuous     Number of Occurrences: Until Specified   Medications    acetaminophen (Tylenol Extra Strength) tab 1,000 mg     Frequency: Q6H PRN     Dose: 1,000 mg     Route: Oral    HYDROmorphone (Dilaudid) 1 MG/ML injection 0.2 mg     Linked Order: Or     Frequency: Q2H PRN     Dose: 0.2 mg     Route: Intravenous    HYDROmorphone (Dilaudid) 1 MG/ML injection 0.4 mg     Linked Order: Or     Frequency: Q2H PRN     Dose: 0.4 mg     Route: Intravenous    HYDROmorphone (Dilaudid) 1 MG/ML injection 0.8 mg     Linked Order: Or     Frequency: Q2H PRN     Dose: 0.8 mg     Route: Intravenous    LORazepam (Ativan) 2 mg/mL injection 1 mg     Frequency: Q6H PRN     Dose: 1 mg     Route: Intravenous    morphINE PF 4 MG/ML injection 4 mg     Frequency: Q2H PRN     Dose: 4 mg     Route: Intravenous    sodium chloride 0.9% infusion     Frequency: Continuous     Route: Intravenous       Lane Ontiveros MD  6/1/2025           [1]   Current Facility-Administered Medications   Medication Dose Route  Frequency    HYDROmorphone (Dilaudid) 1 MG/ML injection 0.2 mg  0.2 mg Intravenous Q2H PRN    Or    HYDROmorphone (Dilaudid) 1 MG/ML injection 0.4 mg  0.4 mg Intravenous Q2H PRN    Or    HYDROmorphone (Dilaudid) 1 MG/ML injection 0.8 mg  0.8 mg Intravenous Q2H PRN    LORazepam (Ativan) 2 mg/mL injection 1 mg  1 mg Intravenous Q6H PRN    morphINE PF 4 MG/ML injection 4 mg  4 mg Intravenous Q2H PRN    acetaminophen (Tylenol Extra Strength) tab 1,000 mg  1,000 mg Oral Q6H PRN    sodium chloride 0.9% infusion   Intravenous Continuous   [2]   Patient Active Problem List  Diagnosis    Squamous cell cancer, anus (HCC)    Iron deficiency anemia due to chronic blood loss    Rectal bleeding    Cancer associated pain    Hyponatremia

## 2025-06-02 NOTE — CONSULTS
Palliative Care Initial Inpatient Consult    Joseph Angel Patient Status:  Inpatient    1980 MRN [id number]   Location [facility] 4W/SW/SE Attending Lane Ontiveros MD   Hosp Day # 2 PCP None Pcp     Date of Consult: 2025  Patient seen at: [facility]    Reason for Consultation: Consult requested for goals of care and care coordination.    Subjective     History of Present Illness: 45-year-old male with squamous cell carcinoma presented to the ER with rectal bleeding and uncontrolled pain.  Patient was seen in the ER recently for anemia and had blood transfusion.  On presentation, he was noted to have an hemoglobin of 8, with WBC of 34.4.  Patient denies having any fevers or chills.No localizing symptoms. However he is interested in getting blood transfusion to improve his hemoglobin up to 14.  And also would like to be considered for surgical resection of his tumor.  He had informational meeting with hospice. Our palliative care service was asked to evaluate the patient for a goals of care discussion and symptom management.      Review of Systems: Palliative Care symptom needs assessed:     Denies dypsnea.   Denies cough or lightheadedness/dizziness.   See below for current pain issues.   Minimal appetite  Denies n/v   See below for constipation/diarrhea issues.   No depression or anxiety.      Palliative Care Social History:   Marital Status: Single  Children: No  Living Situation Prior to Admit: Home  Occupational History: Working  Personal:     Spiritual  Joseph Angel [employer]    requested: No    Medical History: obtained from [employer]  Past Medical History[1]  Past Surgical History[2]    Family History: obtained from [facility]  Family History[3]    Allergies:  Allergies[4]    Medications:   Current Hospital Medications[5]  Prior to Admission Medications[6]      Palliative Performance Scale: 30 %  % Ambulation Activity Level Self-Care Intake Consciousness   100 Full  Normal  No  Disease Full Normal Full   90 Full  Normal  Some Disease Full Normal Full   80 Full  Normal w/effort  Some Disease Full Normal or reduced Full   70 Reduced  Can't Perform Job  Some Disease Full Normal or reduced Full   60 Reduced  Can't Perform Hobby   Significant Disease Occ Assist Normal or reduced Full or confused   50 Mainly sit/lie Can't do any work  Extensive Disease Partial Assist Normal or reduced Full or confused   40 Mainly in bed Can't do any work  Extensive Disease Mainly Assist Normal or reduced Full or confused   30 Bed Bound Can't do any work  Extensive Disease Max Assist  Total Care Reduced  Drowsy/confused   20 Bed Bound Can't do any work  Extensive Disease Max Assist  Total Care Minimal  Drowsy/confused   10 Bed Bound Can't do any work  Extensive Disease Max Assist  Total Care Mouth Care  Drowsy/confused   0 Death        Objective      Vital Signs:  Blood pressure (!) 83/48, pulse 98, temperature 99.5 °F (37.5 °C), temperature source Oral, resp. rate 18, height 5' 9\" (1.753 m), weight 87 lb (39.5 kg), SpO2 99%.  Body mass index is 12.85 kg/m².  Non-verbal signs of pain present: No    Physical Exam:  General: Alert, awake and in no  apparent respiratory distress. Emaciated.  HEENT: MMM. No obvious focal deficits.   Cardiac: RRR  Lungs: Normal effort on RA  Abdomen: Soft, non-distended  Extremities:  Large Edema present  Skin: Malodorous anal lesion    Hematology:  Lab Results   Component Value Date    WBC 25.9 (H) 06/02/2025    HGB 6.0 (LL) 06/02/2025    HCT 20.8 (L) 06/02/2025    .0 06/02/2025       Coags:  Lab Results   Component Value Date    INR 1.05 03/10/2025    PTT 28.8 10/28/2024       Chemistry:  Lab Results   Component Value Date    CREATSERUM 0.66 (L) 06/02/2025    BUN 11 06/02/2025     (L) 06/02/2025    K 3.1 (L) 06/02/2025     06/02/2025    CO2 21.0 06/02/2025    GLU 94 06/02/2025    CA 9.2 06/02/2025    ALB 3.3 05/26/2025    ALKPHO 108 05/26/2025    BILT 0.3  05/26/2025    TP 6.9 05/26/2025    AST <8 05/26/2025    ALT <7 (L) 05/26/2025       Imaging:  No results found.    Assessment and Recommendations        Squamous cell cancer, anus (HCC)    Rectal bleeding    Cancer associated pain    Hyponatremia    Symptom Management      Pain:  -IV dilaudid is effective  -will start methadone soln 2.5mg BID  -will transition to oral pain meds asap    Nausea/Cachexia:  -complains of chronic dysphagia  -wants to bring in his own protein/tube feed shakes 'katefarms'     Prevention of Constipation:    - struggles with difficult bms due to tumor   - start lactulose 10g BID    2.     Advanced Care Planning  A voluntarily discussion and explanation regarding Advance Care Planning (ACP) took place today with patient. We discussed the risks vs benefits of life sustaining treatments in the setting of Joseph Angel's comorbid medical conditions. Items addressed: patient preferences , code status , change in health status , end-of-life care plan, and general prognosis . This resulted in a better understanding of ACP documentation , a better understanding of pt's expressed wishes/preferences , and confirmation of code status .   Summary:     Total time dedicated to ACP >46 minutes .       3.     Serious Illness Conversation:   Code Status: DNAR  Patient is essentially estranged from his family  He defers to his friends listed as emergency surrogate decision-makers but he fails to see what they might be needed for, as he states his preference for DNR and that is all they need to know.   He states that he only wants to know about 2 medical treatments and that's it:  If a colorectal surgeon could do a local palliative debulking/resection  If he can get blood transfusions because 'normal is around 10mg/dl. And I haven't had an erection in months, you know, like normal men's health stuff.'  I asked about caregiving if he became weaker: he said he would want his friends to take him to an airbnb.   I  will follow up on Dr. Conn's conversation. I advised him that a resection would not likely be offered as it would most likely not heal or require margins to accomplish. Not to mention his overall frailty.   I think he will need pain control and outpatient/shelter care planning.  He WAS interested in inpatient hospice so I will alert [facility].     Palliative Care Follow Up: Palliative care team will Continue to follow while inpatient        A total of 80 minutes were spent on this visit, which included all of the following: direct face to face contact, history taking, physical examination, and >50% was spent counseling and coordinating care.    Cristiano Jauregui MD  6/2/2025  11:56 AM  Palliative Care Services  [facility] [phone]    Note to patient:  The 21st Century Cures Act makes medical notes like these available to patients in the interest of transparency. However, be advised this is a medical document. It is intended as peer to peer communication. It is written in medical language and may contain abbreviations or verbiage that are unfamiliar. It may appear blunt or direct. Medical documents are intended to carry relevant information, facts as evident, and the clinical opinion of the practitioner.           [1]   Past Medical History:   Anal cancer (HCC)   [2] History reviewed. No pertinent surgical history.  [3]   Family History  Problem Relation Age of Onset    Other (Hx of breast cancer) Mother    [4]   Allergies  Allergen Reactions    Shellfish Allergy HIVES    Shrimp RASH   [5]   Current Facility-Administered Medications:     sodium chloride 0.9% infusion, , Intravenous, Once    methadone (Dolophine) 10 MG/5ML oral solution 2.5 mg, 2.5 mg, Oral, BID    lactulose (CHRONULAC) 10 GM/15ML solution 10 g, 10 g, Oral, BID    hydrOXYzine HCl (ATARAX) 10 MG/5ML syrup 10 mg, 10 mg, Oral, TID    HYDROmorphone (Dilaudid) 1 MG/ML injection 0.2 mg, 0.2 mg, Intravenous, Q2H PRN **OR** HYDROmorphone  (Dilaudid) 1 MG/ML injection 0.4 mg, 0.4 mg, Intravenous, Q2H PRN **OR** HYDROmorphone (Dilaudid) 1 MG/ML injection 0.8 mg, 0.8 mg, Intravenous, Q2H PRN    LORazepam (Ativan) 2 mg/mL injection 1 mg, 1 mg, Intravenous, Q6H PRN    morphINE PF 4 MG/ML injection 4 mg, 4 mg, Intravenous, Q2H PRN    acetaminophen (Tylenol Extra Strength) tab 1,000 mg, 1,000 mg, Oral, Q6H PRN    sodium chloride 0.9% infusion, , Intravenous, Continuous  [6]   No current outpatient medications on file.

## 2025-06-02 NOTE — CONSULTS
[facility]  Report of Consultation    Joseph Angel Patient Status:  Inpatient    1980 MRN [id number]   Location [facility] 4W/SW/SE Attending Lane Ontiveros MD   Hosp Day # 2 PCP None Pcp     Reason for Consultation:  Anal SCC     Chief Complaint:  Rectal pain with bleeding    History of Present Illness:  Joseph Angel is a 45 year old male who presents to [facility] on 2025 with complaints of weakness, rectal bleeding, uncontrolled pain.  He was here in the ER on , was found to be anemic and was transfused blood, but notes indicate that he later declined admission. Patient wishes to be kept comfortable, pain managed, would like to meet with palliative care. Patient declined any intensive treatment.  Patient confirms more than once that he would like to be DNR/DNI and does not want any resuscitation efforts. Patient admitted and palliative and hospice care consulted.    Upon presentation to the hospital, WBC 34.4, possibly related to untreated cancer/skin erosion and bleeding. Hgb 8, Na 130. Supportive care provided with IV fluids and pain management.    Past medical history: neglected squamous cell anal cancer (). Patient has refused the treatment on multiple occasions.     Oncological history:  Initially presented with painful anal mass at the end of  with anal spotting beginning Aug 2022  2023 CEA 3.1; Aug 2024 HIV non reactive  23 CT AP: Internal verge nodular hyperdense/enhancing soft tissue, possibly  corresponding to patient's reported primary neoplasm. No evidence of metastatic disease.   23 CT chest: No evidence of metastatic disease in the chest.   Patient declined colonoscopy and biopsy.  Presented to [facility] 2024 with bleeding anal mass, Hgb 4.9.  24 MRI Abdomen showed a large lesion extending inferiorly from the perineum measures over an area of 7.1 x 4.4 cm, with some variable enhancement. Diffuse  ill-defined stranding and enhancement in the perirectal fat.   8/2/24 bedside biopsy: pathology of anal mass showed invasive,moderately differentiated squamous cell carcinoma   Left [facility], lost to f/u until 10/2024 when presented to [facility] ED for anal bleeding, hgb 6.2, given 2 units pRBC and d/c'd - followed up with ELBERT Barnes, Feb 2025 for anemia - Patient only wanted to be seen for palliative transfusions.    History:  Past Medical History[1]  Past Surgical History[2]  Family History[3]   reports that he has never smoked. He has never used smokeless tobacco. He reports that he does not drink alcohol and does not use drugs.    Allergies:  Allergies[4]    Medications:  Prescriptions Prior to Admission[5]    Current Hospital Medications[6]    Review of Systems:  Review of Systems   Constitutional:  Positive for appetite change and fatigue. Negative for activity change, chills, fever and unexpected weight change.   Respiratory:  Negative for cough, chest tightness, shortness of breath and wheezing.    Cardiovascular:  Negative for chest pain and palpitations.   Gastrointestinal:  Negative for nausea, vomiting, abdominal pain, diarrhea, constipation, blood in stool and abdominal distention.        + Anal pain   Genitourinary:  Negative for dysuria, hematuria and difficulty urinating.   Musculoskeletal:  Negative for myalgias.   Skin:  Negative for color change, pallor and rash.   Neurological:  Negative for weakness and light-headedness.   Psychiatric/Behavioral:  Negative for confusion and depressed mood.        Physical Exam:  BP (!) 82/39 (BP Location: Right arm)   Pulse 94   Temp 98.2 °F (36.8 °C) (Temporal)   Resp 17   Ht 1.753 m (5' 9\")   Wt 39.5 kg (87 lb)   SpO2 98%   BMI 12.85 kg/m²   Physical Exam  Constitutional:       General: He is not in acute distress.     Appearance: He is ill-appearing.      Comments: Appears very emaciated   HENT:      Head: Normocephalic and atraumatic.    Eyes:      General: No scleral icterus.     Conjunctiva/sclera: Conjunctivae normal.      Pupils: Pupils are equal, round, and reactive to light.   Cardiovascular:      Rate and Rhythm: Normal rate and regular rhythm.   Pulmonary:      Effort: Pulmonary effort is normal. No respiratory distress.      Breath sounds: No wheezing.   Chest:      Chest wall: No tenderness.   Abdominal:      Comments: Patient denied exam at present time   Musculoskeletal:         General: Normal range of motion.      Cervical back: Normal range of motion.      Right lower leg: No edema.      Left lower leg: No edema.   Skin:     General: Skin is warm and dry.      Coloration: Skin is pale. Skin is not jaundiced.   Neurological:      Mental Status: He is alert and oriented to person, place, and time. Mental status is at baseline.   Psychiatric:         Mood and Affect: Mood normal.         Behavior: Behavior normal.         Laboratory Data:  Recent Labs   Lab 05/31/25 1736 06/02/25  0643   RBC 3.29* 2.51*   HGB 8.0* 6.0*   HCT 27.5* 20.8*   MCV 83.6 82.9   MCH 24.3* 23.9*   MCHC 29.1* 28.8*   RDW 18.3* 17.5*   NEPRELIM 29.30* 22.66*   WBC 34.4* 25.9*   .0* 294.0       Recent Labs   Lab 05/31/25 1736 06/02/25  0643   * 94   BUN 17 11   CREATSERUM 0.85 0.66*   CA 11.9* 9.2   * 132*   K 4.4 3.1*    104   CO2 24.0 21.0         No results for input(s): \"PTP\", \"INR\", \"PTT\" in the last 168 hours.      No results found.        Medical Decision Making         Impression:  Problem List[7]  Patient does not wish to talk in depth at present time but is very interested in \"palliative surgery\" to remove the mass that is protruding from anus.  States he is too tired and would like to have a cup of coffee then have a discussion about surgery.  Reports rectal pain which has improved with pain medication.  Denies nausea and vomiting.  Has been having bowel movements but they have been very painful.  Patient does not ambulate,  has caretaker at home.  Patient was presented in our multidisciplinary tumor board on 3/11/2025 including review of images and the mass invades into the prostate and inferior sacrum. Ultimately, this mass is unresectable and any sort of a palliative resection of the external protrusion was not recommended.  Will revisit the patient at a later time to discuss tumor board recommendations.  Lisa Wolff, APRN  6/2/2025  2:27 PM          Patient seen  Reviewed pertinent history in chart  Locally advanced anal squamous cell carcinoma  Extremely difficult patient, he is interested in me \"cutting out\" the protruding part of the cancer  I explained why this is not feasible or advisable  I recommend the CT scan, he refused  I explained to him that if we are unable to conduct the recommended workup and establish a relationship of trust, I am unfortunately unable to participate in his treatment  He understands    George Carmona MD  [facility] General Surgical Oncology  System Medical Director of Surgical Services  [place]        [1]   Past Medical History:   Anal cancer (HCC)   [2] History reviewed. No pertinent surgical history.  [3]   Family History  Problem Relation Age of Onset    Other (Hx of breast cancer) Mother    [4]   Allergies  Allergen Reactions    Shellfish Allergy HIVES    Shrimp RASH   [5]   Medications Prior to Admission   Medication Sig    acetaminophen 160 MG/5ML Oral Solution Take 500-1,000 mg by mouth every 6 (six) hours as needed.    Naproxen Sodium (ALEVE) 220 MG Oral Cap Take 220 mg by mouth as needed.    MEBENDAZOLE OR Take 222 mg by mouth in the morning and 222 mg before bedtime.   [6]   Current Facility-Administered Medications:     methadone (Dolophine) 10 MG/5ML oral solution 2.5 mg, 2.5 mg, Oral, BID    lactulose (CHRONULAC) 10 GM/15ML solution 10 g, 10 g, Oral, BID    hydrOXYzine HCl (ATARAX) 10 MG/5ML syrup 10 mg, 10 mg, Oral, TID    potassium chloride (Klor-Con M20) tab  40 mEq, 40 mEq, Oral, Q4H    HYDROmorphone (Dilaudid) 1 MG/ML injection 0.2 mg, 0.2 mg, Intravenous, Q2H PRN **OR** HYDROmorphone (Dilaudid) 1 MG/ML injection 0.4 mg, 0.4 mg, Intravenous, Q2H PRN **OR** HYDROmorphone (Dilaudid) 1 MG/ML injection 0.8 mg, 0.8 mg, Intravenous, Q2H PRN    LORazepam (Ativan) 2 mg/mL injection 1 mg, 1 mg, Intravenous, Q6H PRN    morphINE PF 4 MG/ML injection 4 mg, 4 mg, Intravenous, Q2H PRN    acetaminophen (Tylenol Extra Strength) tab 1,000 mg, 1,000 mg, Oral, Q6H PRN    sodium chloride 0.9% infusion, , Intravenous, Continuous  [7]   Patient Active Problem List  Diagnosis    Squamous cell cancer, anus (HCC)    Iron deficiency anemia due to chronic blood loss    Rectal bleeding    Cancer associated pain    Hyponatremia    Palliative care by specialist

## 2025-06-03 ENCOUNTER — APPOINTMENT (OUTPATIENT)
Dept: CT IMAGING | Facility: HOSPITAL | Age: 45
End: 2025-06-03
Payer: MEDICAID

## 2025-06-03 NOTE — PROGRESS NOTES
[facility]  Progress Note    Joseph Angel Patient Status:  Inpatient    1980 MRN [id number]   Location [facility] 4W/SW/SE Attending Lane Ontiveros MD   Hosp Day # 3 PCP None Pcp     Rectal pain with bleeding  Hx neglected anal SCC  Subjective:  Patient laying bed, comfortable. Denies nausea, vomiting, abdominal pain. Having some rectal discomfort but is well controlled with pain medications.Informed patient that we ordered a CT abdomen pelvis to have current imaging available for review. Patient stated he does not want to complete the scan and has instructed to use the prior CT from 2024 ([facility]). Patient may be agreeable to rectal MRI. Patient declined physical exam stating he needs to shower first then he will allow for exam later in the day. He is requesting a \"local resection\" of the tissue protruding from his anus but does not want a \"total resection\". Would like to discuss further later today with Dr. Carmona.    Objective/Physical Exam:  General: Alert, orientated x3.  Cooperative.  No apparent distress.  Vital Signs:  Blood pressure (!) (P) 89/49, pulse (P) 108, temperature (P) 99 °F (37.2 °C), temperature source (P) Oral, resp. rate (P) 18, height 1.753 m (5' 9\"), weight 39.5 kg (87 lb), SpO2 (P) 100%.  Wt Readings from Last 3 Encounters:   25 39.5 kg (87 lb)   25 40 kg (88 lb 2.9 oz)   03/10/25 40.8 kg (90 lb)     Patient declined PE    Intake/Output:    Intake/Output Summary (Last 24 hours) at 6/3/2025 1322  Last data filed at 6/3/2025 1224  Gross per 24 hour   Intake 1030.81 ml   Output 2400 ml   Net -1369.19 ml     I/O last 3 completed shifts:  In: 1585.8 [P.O.:290; I.V.:575; Blood:370.8; IV PIGGYBACK:350]  Out: 2400 [Urine:2400]  I/O this shift:  In: 100 [P.O.:100]  Out: 800 [Urine:800]    Medications: Scheduled Medications[1]    Labs:  Lab Results   Component Value Date    WBC 26.2 2025    HGB 6.9 2025    HCT 22.4 2025     .0 06/03/2025     Lab Results   Component Value Date     06/03/2025    K 3.7 06/03/2025     06/03/2025    CO2 21.0 06/03/2025    BUN 8 06/03/2025    CREATSERUM 0.52 06/03/2025     06/03/2025    CA 8.5 06/03/2025    ALKPHO 102 06/03/2025    ALT 7 06/03/2025    AST 13 06/03/2025    BILT 0.5 06/03/2025    ALB 2.2 06/03/2025    TP 4.7 06/03/2025     Lab Results   Component Value Date    INR 1.05 03/10/2025    INR 0.95 10/28/2024         Assessment  Problem List[2]    No acute surgical concerns  May proceed with MRI if patient agreeable  Continue medical management  Will follow on standby       HATTIE Fields  Department of Surgical Oncology  [facility]  [address] [place]  [address]  [facility] [address] [place] [address]  T: [phone]  F: [phone]      Quality:  DVT Mechanical Prophylaxis:        DVT Pharmacologic Prophylaxis   Medication   None                Code Status: DNAR/Comfort Care  Vasquez: No urinary catheter in place  Vasquez Duration (in days):   Central line:    RICHI: 6/4/2025        HATTIE Fields  6/3/2025  1:22 PM               [1]    cefepime  2 g Intravenous Q8H    potassium chloride  20 mEq Intravenous Once    morphINE  10 mg Oral q6h    lactulose  10 g Oral BID    hydrOXYzine HCl  10 mg Oral TID    thiamine  100 mg Oral Daily   [2]   Patient Active Problem List  Diagnosis    Squamous cell cancer, anus (HCC)    Iron deficiency anemia due to chronic blood loss    Rectal bleeding    Cancer associated pain    Hyponatremia    Palliative care by specialist

## 2025-06-03 NOTE — PAYOR COMM NOTE
--------------  ADMISSION REVIEW   5/31-6/3  Payor: [facility]  Subscriber #:  [id number]  Authorization Number: [id number]    Admit date: 5/31/25  Admit time: 10:59 PM       REVIEW DOCUMENTATION:     ED Provider Notes        ED Provider Notes signed by Dorina Jeff MD at 6/1/2025  1:17 AM       Author: Dorina Jeff MD Service: Emergency Medicine Author Type: Physician    Filed: 6/1/2025  1:17 AM Date of Service: 5/31/2025  5:38 PM Status: Signed    : Dorina Jeff MD (Physician)           Patient Seen in: [facility] Emergency Department  History  Chief Complaint   Patient presents with    Anal Problem     Stated Complaint: rectal bleed    Subjective:   45 year old male with 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  Patient states that he would like to be palliative care/DO NOT RESUSCITATE.  He states he would like to be comfortable and states \"I am at the end\".  The patient has declined treatment up until this point according to oncology notes.  He was here in the ER on 5/26, was found to be anemic and was transfused blood, but notes indicate that he later declined admission.    Objective:     Past Medical History:    Anal cancer (HCC)     ED Triage Vitals   BP 05/31/25 1727 (!) 64/48   Pulse 05/31/25 1727 113   Resp 05/31/25 1727 18   Temp 05/31/25 1728 98.9 °F (37.2 °C)   Temp src 05/31/25 1728 Temporal   SpO2 05/31/25 1730 94 %   O2 Device 05/31/25 1730 None (Room air)       Current Vitals:   Vital Signs  BP: (!) 70/43 (md aware)  Pulse: 73  Resp: 18  Temp: 97.9 °F (36.6 °C)  Temp src: Oral  MAP (mmHg): (!) 52    Oxygen Therapy  SpO2: 99 %  O2 Device: None (Room air)    Physical Exam  Vitals and nursing note reviewed.   Constitutional:       General: He is not in acute distress.     Appearance: Normal appearance. He is well-developed. He is  cachectic. He is ill-appearing (chronic ill appearance). He is not toxic-appearing or diaphoretic.   HENT:      Head: Normocephalic and atraumatic.   Eyes:      Conjunctiva/sclera: Conjunctivae normal.      Pupils: Pupils are equal, round, and reactive to light.   Cardiovascular:      Rate and Rhythm: Normal rate and regular rhythm.      Pulses: Normal pulses.      Heart sounds: Normal heart sounds. No murmur heard.  Pulmonary:      Effort: Pulmonary effort is normal. No respiratory distress.      Breath sounds: Normal breath sounds. No wheezing.   Abdominal:      General: There is no distension.      Palpations: Abdomen is soft.      Tenderness: There is no abdominal tenderness. There is no guarding.   Musculoskeletal:         General: No tenderness or deformity. Normal range of motion.      Cervical back: Full passive range of motion without pain, normal range of motion and neck supple. No rigidity. Normal range of motion.      Right lower leg: No edema.      Left lower leg: No edema.   Skin:     General: Skin is warm and dry.      Coloration: Skin is pale.      Findings: No rash.   Neurological:      Mental Status: He is alert and oriented to person, place, and time.      GCS: GCS eye subscore is 4. GCS verbal subscore is 5. GCS motor subscore is 6.      Sensory: Sensation is intact. No sensory deficit.      Motor: Motor function is intact. No weakness.      Coordination: Coordination normal.   Psychiatric:         Attention and Perception: Attention normal.         Mood and Affect: Mood normal.         Behavior: Behavior normal. Behavior is cooperative.       ED Course  Labs Reviewed   CBC WITH DIFFERENTIAL WITH PLATELET - Abnormal; Notable for the following components:       Result Value    WBC 34.4 (*)     RBC 3.29 (*)     HGB 8.0 (*)     HCT 27.5 (*)     MCH 24.3 (*)     MCHC 29.1 (*)     RDW-SD 55.2 (*)     RDW 18.3 (*)     .0 (*)     Neutrophil Absolute Prelim 29.30 (*)     Neutrophil Absolute 29.30  (*)     Monocyte Absolute 2.07 (*)     All other components within normal limits   BASIC METABOLIC PANEL (8) - Abnormal; Notable for the following components:    Glucose 105 (*)     Sodium 130 (*)     Calcium, Total 11.9 (*)     Calculated Osmolality 272 (*)     All other components within normal limits   SCAN SLIDE   TYPE AND SCREEN    Narrative:     The following orders were created for panel order Type and screen.  Procedure                               Abnormality         Status                     ---------                               -----------         ------                     ABORH (Blood Type)[[id number]]                               Final result               Antibody Screen[[id number]]                                  inal result                 Please view results for these tests on the individual orders.   ABORH (BLOOD TYPE)   ANTIBODY SCREEN         MDM     Pulse Ox: 99%, Normal, RA      Prior notes reviewed: yes -oncology notes reviewed, indicated patient has been been diagnosed with this cancer since 2022 and has declined treatment      Chronic Medical Conditions Potentially Contributing: Squamous cell carcinoma of the anus        Medications   morphINE PF 2 MG/ML injection 4 mg (4 mg Intravenous Given 5/31/25 2333)   acetaminophen (Tylenol Extra Strength) tab 1,000 mg (has no administration in time range)   sodium chloride 0.9% infusion ( Intravenous New Bag 5/31/25 2334)   sodium chloride 0.9 % IV bolus 1,000 mL (0 mL Intravenous Stopped 5/31/25 1911)   morphINE PF 4 MG/ML injection 4 mg (4 mg Intravenous Given 5/31/25 1742)   morphINE PF 4 MG/ML injection 4 mg (4 mg Intravenous Given 5/31/25 1932)       I discussed with patient, he wishes to be kept comfortable, pain managed, would like to meet with palliative care.  He informs me that he has not ever been introduced to hospice and might be interested.  Patient declines any intensive treatment.  Patient confirms more than once that he would  like to be DNR/DNI and does not want any resuscitation efforts.  Will admit, palliative care on consult.  WBC still elevated, suspect this is due to untreated cancer/skin erosion and bleeding. No other focus of infection including no cough/congestion, abd pain, vomiting, diarrhea, or rash.     D/w Dr Ontiveros - will admit  D/w Dr Gregory - will consult    Disposition and Plan     Clinical Impression:  1. Squamous cell cancer, anus (HCC)    2. Rectal bleeding    3. Cancer associated pain    4. Hyponatremia         Disposition:  Admit  5/31/2025  8:50 pm    Hospital Problems       Present on Admission  Date Reviewed: 3/13/2025          ICD-10-CM Noted POA    * (Principal) Squamous cell cancer, anus (HCC) C21.0 3/7/2025 Unknown    Cancer associated pain G89.3 5/31/2025 Unknown    Hyponatremia E87.1 5/31/2025 Unknown    Rectal bleeding K62.5 5/31/2025 Unknown                 5/31 H&P        Chief Complaint   Patient presents with    Anal Problem         HPI:   Joseph Angel is a(n) 45 year old male.  With squamous cell anal cancer, he has a known history of anal cancer dating back to 2022.  Patient has refused the treatment on multiple occasion patient came to the emergency room because of the increased pain and also patient was having some bleeding.  Patient wants to be DNR comfort care.  Patient tells me that he has lost a lot of weight and he is at the end.  He does not want a chemotherapy or radiation.  He also does not want any surgery because patient tells me that he had seen Dr. Beverly in the past and he did not like what he has to say he does not want any colostomy.  Previous oncology notes were reviewed      45-year-old cachectic male resting in the bed he is in distress secondary to pain.  HEENT: Pallor is noted.    Anal examination patient has a necrotic mass is noted.       Assessment/Plan:     Squamous cell cancer, anus (HCC)  With metastasis.  Patient has refused chemotherapy radiation therapy as well as  surgical resection in the past.  He wants comfort care now.  Wants to be DNR.  Patient tells me that he is at the end       Rectal bleeding  Secondary to anal cancer.  His hemoglobin is 8.0.       Cancer associated pain  Continue the patient on morphine       Hyponatremia  Most likely related to his cancer.     Severe protein calorie malnutrition from malignancy.           6/1 Hem Onc    45-year-old male with squamous cell carcinoma of the anus.     Anus squamous cell carcinoma  Declined treatment in the past.  Also was not interested in additional imaging.  Mass was unresectable and palliative resection was not recommended.  Patient wants me to read up on autologous blood donation however wants me to consider transfusing him with a target threshold of 10-14.  Discussed with the patient that we do not recommend blood transfusion to target a hemoglobin of 10.  We could consider IV iron to help improve his hemoglobin      Patient is currently not interested in discussing about any of his treatment options.     - supportive management of pain     Anemia  - likely secondary to blood loss.           6/1 IM    Patient tells me he pain is better with the Dilaudid.  Patient has not decided about hospice care he wants to think about it.  He wants to meet with the palliative care physician tomorrow.  Patient wants me to transfuse him blood at least 4 to 5 units to bring his hemoglobin up to 14 g.  Patient tells me that he went to [place] in the past and he brought blood transfusion and hemoglobin went up to 9 g and he was feeling much better.  And he also noticed that he had an erection.  After blood was transfused.  I explained to him that the I would transfuse him if his hemoglobin is less than 7.0 the goal would be to bring him up to 7.0 not to 14 g.  He did does not agree with that.  He tells me that he could get the blood from [facility] and his friends can donate him blood.  He also does not want me to repeat his CBC or  basic metabolic panel now to see what his hemoglobin is he just wants to get a blood transfusion I explained to him again that the goal would be to keep him more than 7.0.  I was reviewing the previous oncology notes he had made the similar request to his oncologist and they have declined that also.      Complains of anal pain.  Pain is under control with Dilaudid  The patient wants a blood transfusion to hold for greater than 7.0.  Hemoglobin of at least 13 to 14 mg     OBJECTIVE:  Vital signs in last 24 hours:  BP (!) 82/40 (BP Location: Right arm)   Pulse 73   Temp 97.9 °F (36.6 °C) (Oral)   Resp 18   Ht 5' 9\" (1.753 m)   Wt 87 lb (39.5 kg)   SpO2 99%   BMI 12.85 kg/m²       Gen: No acute distress, alert and oriented x3,  HEENT: Pallor is noted  Pulm: Lungs clear, normal respiratory effort  CV: Heart with regular rate and rhythm, no peripheral edema  Abd: Abdomen soft, nontender, nondistended, no organomegaly, bowel sounds present  MSK: Full range of motion in extremities, no clubbing, no cyanosis  Skin: no rashes or lesions  CNS: Alert    Medication Dose Route Frequency    HYDROmorphone (Dilaudid) 1 MG/ML injection 0.2 mg  0.2 mg Intravenous Q2H PRN     Or    HYDROmorphone (Dilaudid) 1 MG/ML injection 0.4 mg  0.4 mg Intravenous Q2H PRN     Or    HYDROmorphone (Dilaudid) 1 MG/ML injection 0.8 mg  0.8 mg Intravenous Q2H PRN    LORazepam (Ativan) 2 mg/mL injection 1 mg  1 mg Intravenous Q6H PRN    morphINE PF 4 MG/ML injection 4 mg  4 mg Intravenous Q2H PRN    acetaminophen (Tylenol Extra Strength) tab 1,000 mg  1,000 mg Oral Q6H PRN    sodium chloride 0.9% infusion   Intravenous Continuous     Plan:      Continue pain control.     I had a lengthy discussion with the patient and advised him that RBC transfusion to 13 to 14 g is not recommended.  I recommended that can do blood test if the hemoglobin less than 7 we can transfuse him 1 unit of packed red blood cells     The patient is refusing blood  draw.            6/2 IM    SUBJECTIVE:  Complains of anal pain.  Pain is under control with Dilaudid     OBJECTIVE:  Vital signs in last 24 hours:  BP (!) 79/41 (BP Location: Right arm)   Pulse 102   Temp 99.5 °F (37.5 °C) (Oral)   Resp 16   Ht 5' 9\" (1.753 m)   Wt 87 lb (39.5 kg)   SpO2 99%   BMI 12.85 kg/m²       Gen: No acute distress, alert and oriented x3,  HEENT: Pallor is noted  Pulm: Lungs clear, normal respiratory effort  CV: Heart with regular rate and rhythm, no peripheral edema  Abd: Abdomen soft, nontender, nondistended, no organomegaly, bowel sounds present  MSK: Full range of motion in extremities, no clubbing, no cyanosis  Skin: no rashes or lesions  CNS: Alert    Medication Dose Route Frequency    HYDROmorphone (Dilaudid) 1 MG/ML injection 0.2 mg  0.2 mg Intravenous Q2H PRN     Or    HYDROmorphone (Dilaudid) 1 MG/ML injection 0.4 mg  0.4 mg Intravenous Q2H PRN     Or    HYDROmorphone (Dilaudid) 1 MG/ML injection 0.8 mg  0.8 mg Intravenous Q2H PRN    LORazepam (Ativan) 2 mg/mL injection 1 mg  1 mg Intravenous Q6H PRN    morphINE PF 4 MG/ML injection 4 mg  4 mg Intravenous Q2H PRN    acetaminophen (Tylenol Extra Strength) tab 1,000 mg  1,000 mg Oral Q6H PRN    sodium chloride 0.9% infusion   Intravenous Continuous     Acute on chronic blood loss anemia  Plan:   Will transfuse 1 unit of packed red blood cells and hemoglobin is 6.0.  Consulted colorectal surgery  Continue pain control.     I had a lengthy discussion with the patient and advised him that RBC transfusion to 13 to 14 g is not recommended.  I recommended that can do blood test if the hemoglobin less than 7 we can transfuse him 1 unit of packed red blood cells     The patient is refusing blood draw.        6/2 surgical onc  Patient does not wish to talk in depth at present time but is very interested in \"palliative surgery\" to remove the mass that is protruding from anus.  States he is too tired and would like to have a cup of coffee  then have a discussion about surgery.  Reports rectal pain which has improved with pain medication.  Denies nausea and vomiting.  Has been having bowel movements but they have been very painful.  Patient does not ambulate, has caretaker at home.  Patient was presented in our multidisciplinary tumor board on 3/11/2025 including review of images and the mass invades into the prostate and inferior sacrum. Ultimately, this mass is unresectable and any sort of a palliative resection of the external protrusion was not recommended.  Will revisit the patient at a later time to discuss tumor board recommendations.        6/3 Palliative Care    Reason for Consultation: Consult requested for goals of care and care coordination.    S: Pruritus greatly improved. Did not like methadone. Prefers IV morphine.      Review of Systems: Palliative Care symptom needs assessed:      Denies dypsnea.   Denies cough or lightheadedness/dizziness.   See below for current pain issues.   Minimal appetite  Denies n/v   See below for constipation/diarrhea issues.     Current Facility-Administered Medications:     ceFEPIme (Maxipime) 2 g in sodium chloride 0.9% 100mL IVPB-KIARRA, 2 g, Intravenous, Q8H    potassium chloride 20 mEq/100mL IVPB premix 20 mEq, 20 mEq, Intravenous, Once    sodium chloride 0.9% infusion, , Intravenous, Once    ondansetron (Zofran) 4 MG/2ML injection 4 mg, 4 mg, Intravenous, Q4H PRN    acetaminophen (Ofirmev) 10 mg/mL infusion premix 600 mg, 15 mg/kg, Intravenous, Q8H PRN    morphINE 10 MG/5ML oral solution 10 mg, 10 mg, Oral, q6h    lactulose (CHRONULAC) 10 GM/15ML solution 10 g, 10 g, Oral, BID    hydrOXYzine HCl (ATARAX) 10 MG/5ML syrup 10 mg, 10 mg, Oral, TID    thiamine (Vitamin B1) tab 100 mg, 100 mg, Oral, Daily    LORazepam (Ativan) 2 mg/mL injection 1 mg, 1 mg, Intravenous, Q6H PRN    morphINE PF 4 MG/ML injection 4 mg, 4 mg, Intravenous, Q2H PRN    acetaminophen (Tylenol Extra Strength) tab 1,000 mg, 1,000 mg,  Oral, Q6H PRN    sodium chloride 0.9% infusion, , Intravenous, Continuous      Blood pressure (!) 84/34, pulse 104, temperature 99.6 °F (37.6 °C), temperature source Oral, resp. rate 18, height 5' 9\" (1.753 m), weight 87 lb (39.5 kg), SpO2 96%.     Component Value Date     WBC 26.2 (H) 06/03/2025     HGB 6.9 (LL) 06/03/2025     HCT 22.4 (L) 06/03/2025     .0 06/03/2025       Component Value Date     CREATSERUM 0.52 (L) 06/03/2025     BUN 8 (L) 06/03/2025      (L) 06/03/2025     K 3.7 06/03/2025      06/03/2025     CO2 21.0 06/03/2025      (H) 06/03/2025     CA 8.5 (L) 06/03/2025     ALB 2.2 (L) 06/03/2025     ALKPHO 102 06/03/2025     BILT 0.5 06/03/2025     TP 4.7 (L) 06/03/2025     AST 13 06/03/2025     ALT 7 (L) 06/03/2025       Squamous cell cancer, anus (HCC)    Rectal bleeding    Cancer associated pain    Hyponatremia     Symptom Management                  Pain:  -will start morphine liquid soln 10mg q6hr scheduled  -prn IV morphine 4mg will space out to q3  -will transition to oral pain meds asap     Nausea/Cachexia:  -complains of chronic dysphagia  -wants to bring in his own protein/tube feed shakes 'katefarms'     Edema  -complains of large pitting edema in his bilateral LE  -recommend wean or saline lock IVFs                 Prevention of Constipation:                - struggles with difficult bms due to tumor               - lactulose 10g BID      MEDICATIONS ADMINISTERED IN LAST 1 DAY:  Transfuse RBC       Date Action Dose Route User    6/3/2025 1213 New Bag (none) Intravenous Miriam Crum, RN          ceFEPIme (Maxipime) 2 g in sodium chloride 0.9% 100mL IVPB-KIARRA       Date Action Dose Route User    6/3/2025 0611 New Bag 2 g Intravenous Katherine Martin          hydrOXYzine HCl (ATARAX) 10 MG/5ML syrup 10 mg       Date Action Dose Route User    6/3/2025 0725 Given 10 mg Oral Miriam Crum, RN    6/2/2025 2130 Given 10 mg Oral Tootie Cervantes RN    6/2/2025 1343 Given 10 mg Oral  Miriam Crum RN          methadone (Dolophine) 10 MG/5ML oral solution 2.5 mg       Date Action Dose Route User    6/2/2025 1348 Given 2.5 mg Oral Miriam Crum RN          morphINE PF 4 MG/ML injection 4 mg       Date Action Dose Route User    6/3/2025 1247 Given 4 mg Intravenous Miriam Crum RN    6/3/2025 0831 Given 4 mg Intravenous Miriam Crum RN    6/3/2025 0132 Given 4 mg Intravenous Daisy Sue RN    6/2/2025 2131 Given 4 mg Intravenous JacksonTootie Guadarrama RN    6/2/2025 1550 Given 4 mg Intravenous Miriam Crum RN          potassium chloride 20 mEq/100mL IVPB premix 20 mEq       Date Action Dose Route User    6/2/2025 2125 New Bag 20 mEq Intravenous JacksonTootie Guadarrama RN          potassium chloride 40 mEq in 250mL sodium chloride 0.9% IVPB premix       Date Action Dose Route User    6/2/2025 1651 New Bag 40 mEq Intravenous Meenakshi Davis RN          sodium chloride 0.9 % IV bolus 500 mL       Date Action Dose Route User    6/3/2025 0522 New Bag 500 mL Intravenous Katherine Martin          thiamine (Vitamin B1) tab 100 mg       Date Action Dose Route User    6/3/2025 0831 Given 100 mg Oral Miriam Crum RN    6/2/2025 2127 Given 100 mg Oral JacksonTootie Guadarrama RN            Vitals (last day)       Date/Time Temp Pulse Resp BP SpO2 Weight O2 Device O2 Flow Rate (L/min) [facility]    06/03/25 1228 99.9 °F (37.7 °C) 107 18 86/50 100 % -- -- --     06/03/25 1213 99.6 °F (37.6 °C) 105 18 93/56 -- -- -- -- AJ 06/03/25 1212 99.6 °F (37.6 °C) 105 18 93/56 -- -- -- -- AJ 06/03/25 1210 -- -- -- -- 100 % -- None (Room air) --     06/03/25 1106 99.6 °F (37.6 °C) 104 18 100/58 100 % -- -- --     06/03/25 0817 99.6 °F (37.6 °C) -- -- -- -- -- -- --     06/03/25 0614 102.5 °F (39.2 °C) -- -- 84/34 -- -- -- --     06/03/25 0427 102.6 °F (39.2 °C) -- -- 88/41 -- -- -- --     06/03/25 0344 -- -- -- 86/43 -- -- -- --     06/03/25 0340 102 °F (38.9 °C) 104 18 78/37 96 % -- None (Room air) --     06/02/25 2132 -- -- -- 100/47  -- -- -- -- EG    06/02/25 2005 98.2 °F (36.8 °C) 99 18 92/47 98 % -- None (Room air) --     06/02/25 1500 98.2 °F (36.8 °C) 93 18 91/46 99 % -- -- --     06/02/25 1359 98.4 °F (36.9 °C) 95 18 97/54 99 % -- -- --     06/02/25 1304 98.2 °F (36.8 °C) 94 17 82/39 98 % -- None (Room air) -- [place]    06/02/25 1214 98.8 °F (37.1 °C) 104 18 100/54 100 % -- -- --     06/02/25 1159 98.2 °F (36.8 °C) 96 18 97/49 99 % -- -- --     06/02/25 1158 98.2 °F (36.8 °C) 96 18 97/49 99 % -- -- --     06/02/25 1132 -- 98 18 83/48 99 % -- None (Room air) -- NH    06/02/25 0700 -- -- 18 88/50 -- -- -- --     06/02/25 0539 99.5 °F (37.5 °C) 102 16 79/41 99 % -- None (Room air) -- AA          CIWA Scores (since admission)       None          Blood Transfusion Record       Product Unit Status Volume Start End            Transfuse RBC       25  [id number]  [id number] Transfusing  06/03/25 1213        25  [id number]  [id number] Completed 06/02/25 1502 370.83 mL 06/02/25 1159 06/02/25 1500

## 2025-06-03 NOTE — PLAN OF CARE
A&ox4. RA. Prefers PRN morphine for pain. Voiding via bedside urinal. Received 1u prbcs for low hgb 6.0. repeat is 7.3. K replacement in progress. IVF to L forearm PIV.   Problem: Patient Centered Care  Goal: Patient preferences are identified and integrated in the patient's plan of care  Description: Interventions:  - What would you like us to know as we care for you? My family is from [facility].  - Provide timely, complete, and accurate information to patient/family  - Incorporate patient and family knowledge, values, beliefs, and cultural backgrounds into the planning and delivery of care  - Encourage patient/family to participate in care and decision-making at the level they choose  - Honor patient and family perspectives and choices  Outcome: Progressing

## 2025-06-03 NOTE — PROGRESS NOTES
Palliative Care Progress Note    Joseph Angel Patient Status:  Inpatient    1980 MRN [id number]   Location [facility] 4W/SW/SE Attending Lane Ontiveros MD   Hosp Day # 3 PCP None Pcp     Date of Consult: 2025  Patient seen at: [facility]    Reason for Consultation: Consult requested for goals of care and care coordination.    Subjective     S: Pruritus greatly improved. Did not like methadone. Prefers IV morphine.      Review of Systems: Palliative Care symptom needs assessed:     Denies dypsnea.   Denies cough or lightheadedness/dizziness.   See below for current pain issues.   Minimal appetite  Denies n/v   See below for constipation/diarrhea issues.   No depression or anxiety.      Palliative Care Social History:   Marital Status: Single  Children: No  Living Situation Prior to Admit: Home  Occupational History: Working  Personal:     Spiritual  Joseph Angel [employer]    requested: No    Medical History: obtained from [employer]  Past Medical History[1]  Past Surgical History[2]    Family History: obtained from [employer]  Family History[3]    Allergies:  Allergies[4]    Medications:   Current Hospital Medications[5]  Prior to Admission Medications[6]      Palliative Performance Scale: 30 %  % Ambulation Activity Level Self-Care Intake Consciousness   100 Full  Normal  No Disease Full Normal Full   90 Full  Normal  Some Disease Full Normal Full   80 Full  Normal w/effort  Some Disease Full Normal or reduced Full   70 Reduced  Can't Perform Job  Some Disease Full Normal or reduced Full   60 Reduced  Can't Perform Hobby   Significant Disease Occ Assist Normal or reduced Full or confused   50 Mainly sit/lie Can't do any work  Extensive Disease Partial Assist Normal or reduced Full or confused   40 Mainly in bed Can't do any work  Extensive Disease Mainly Assist Normal or reduced Full or confused   30 Bed Bound Can't do any work  Extensive Disease Max Assist  Total Care Reduced   Drowsy/confused   20 Bed Bound Can't do any work  Extensive Disease Max Assist  Total Care Minimal  Drowsy/confused   10 Bed Bound Can't do any work  Extensive Disease Max Assist  Total Care Mouth Care  Drowsy/confused   0 Death        Objective      Vital Signs:  Blood pressure (!) 84/34, pulse 104, temperature 99.6 °F (37.6 °C), temperature source Oral, resp. rate 18, height 5' 9\" (1.753 m), weight 87 lb (39.5 kg), SpO2 96%.  Body mass index is 12.85 kg/m².  Non-verbal signs of pain present: No    Physical Exam:  General: Alert, awake and in no  apparent respiratory distress. Emaciated.  HEENT: MMM. No obvious focal deficits.   Cardiac: RRR  Lungs: Normal effort on RA  Abdomen: Soft, non-distended  Extremities:  Large pitting Edema present  Skin: Malodorous anal lesion    Hematology:  Lab Results   Component Value Date    WBC 26.2 (H) 06/03/2025    HGB 6.9 (LL) 06/03/2025    HCT 22.4 (L) 06/03/2025    .0 06/03/2025       Coags:  Lab Results   Component Value Date    INR 1.05 03/10/2025    PTT 28.8 10/28/2024       Chemistry:  Lab Results   Component Value Date    CREATSERUM 0.52 (L) 06/03/2025    BUN 8 (L) 06/03/2025     (L) 06/03/2025    K 3.7 06/03/2025     06/03/2025    CO2 21.0 06/03/2025     (H) 06/03/2025    CA 8.5 (L) 06/03/2025    ALB 2.2 (L) 06/03/2025    ALKPHO 102 06/03/2025    BILT 0.5 06/03/2025    TP 4.7 (L) 06/03/2025    AST 13 06/03/2025    ALT 7 (L) 06/03/2025       Imaging:  No results found.    Assessment and Recommendations        Squamous cell cancer, anus (HCC)    Rectal bleeding    Cancer associated pain    Hyponatremia    Symptom Management      Pain:  -will start morphine liquid soln 10mg q6hr scheduled  -prn IV morphine 4mg will space out to q3  -will transition to oral pain meds asap    Nausea/Cachexia:  -complains of chronic dysphagia  -wants to bring in his own protein/tube feed shakes 'katefarms'    Edema  -complains of large pitting edema in his bilateral  LE  -recommend wean or saline lock IVFs     Prevention of Constipation:    - struggles with difficult bms due to tumor   - lactulose 10g BID    2.     Advanced Care Planning  A voluntarily discussion and explanation regarding Advance Care Planning (ACP) took place today with patient. We discussed the risks vs benefits of life sustaining treatments in the setting of Joseph Angel's comorbid medical conditions. Items addressed: patient preferences , code status , change in health status , end-of-life care plan, and general prognosis . This resulted in a better understanding of ACP documentation , a better understanding of pt's expressed wishes/preferences , and confirmation of code status .   Summary:     Total time dedicated to ACP >46 minutes .       3.     Serious Illness Conversation:   Code Status: DNAR  Patient is essentially estranged from his family  He defers to his friends listed as emergency surrogate decision-makers but he fails to see what they might be needed for, as he states his preference for DNR and that is all they need to know.   He states that he only wants to know about 2 medical treatments and that's it:  If a colorectal surgeon could do a local palliative debulking/resection  If he can get blood transfusions because 'normal is around 10mg/dl. And I haven't had an erection in months, you know, like normal men's health stuff.'  I asked about caregiving if he became weaker: he said he would want his friends to take him to an airbnb.   I will follow up on Dr. Conn's conversation. I advised him that a resection would not likely be offered as it would most likely not heal or require margins to accomplish. Not to mention his overall frailty.   I think he will need pain control and outpatient/FDC care planning.  He WAS interested in inpatient hospice so I will alert [facility].   He is more comfortable today. He understands that he cannot have IV morphine at home. He is amenable to trial of  liquid morphine today. He is open to discussing home hospice. I reviewed that resection is not possible.    Palliative Care Follow Up: Palliative care team will Continue to follow while inpatient    A total of 50 minutes were spent on this visit, which included all of the following: direct face to face contact, history taking, physical examination, and >50% was spent counseling and coordinating care.    Cristiano Jauregui MD  Palliative Care Services  [facility] [phone]    Note to patient:  The 21st Century Cures Act makes medical notes like these available to patients in the interest of transparency. However, be advised this is a medical document. It is intended as peer to peer communication. It is written in medical language and may contain abbreviations or verbiage that are unfamiliar. It may appear blunt or direct. Medical documents are intended to carry relevant information, facts as evident, and the clinical opinion of the practitioner.           [1]   Past Medical History:   Anal cancer (HCC)   [2] History reviewed. No pertinent surgical history.  [3]   Family History  Problem Relation Age of Onset    Other (Hx of breast cancer) Mother    [4]   Allergies  Allergen Reactions    Shellfish Allergy HIVES    Shrimp RASH   [5]   Current Facility-Administered Medications:     ceFEPIme (Maxipime) 2 g in sodium chloride 0.9% 100mL IVPB-KIARRA, 2 g, Intravenous, Q8H    potassium chloride 20 mEq/100mL IVPB premix 20 mEq, 20 mEq, Intravenous, Once    sodium chloride 0.9% infusion, , Intravenous, Once    ondansetron (Zofran) 4 MG/2ML injection 4 mg, 4 mg, Intravenous, Q4H PRN    acetaminophen (Ofirmev) 10 mg/mL infusion premix 600 mg, 15 mg/kg, Intravenous, Q8H PRN    morphINE 10 MG/5ML oral solution 10 mg, 10 mg, Oral, q6h    lactulose (CHRONULAC) 10 GM/15ML solution 10 g, 10 g, Oral, BID    hydrOXYzine HCl (ATARAX) 10 MG/5ML syrup 10 mg, 10 mg, Oral, TID    thiamine (Vitamin B1) tab 100 mg, 100 mg, Oral, Daily     LORazepam (Ativan) 2 mg/mL injection 1 mg, 1 mg, Intravenous, Q6H PRN    morphINE PF 4 MG/ML injection 4 mg, 4 mg, Intravenous, Q2H PRN    acetaminophen (Tylenol Extra Strength) tab 1,000 mg, 1,000 mg, Oral, Q6H PRN    sodium chloride 0.9% infusion, , Intravenous, Continuous  [6]   No current outpatient medications on file.

## 2025-06-03 NOTE — PROGRESS NOTES
[facility]  part of [facility]    Progress Note    Joseph Angel Patient Status:  Inpatient    1980 MRN [id number]   Location [facility] 4W/SW/SE Attending Lane Ontiveros MD   Hosp Day # 3 PCP None Pcp       SUBJECTIVE:  \"I  I do not need anything from you\"  Patient tells me that he is feeling much better because he is getting the second unit of blood    OBJECTIVE:  Vital signs in last 24 hours:  BP (!) 84/34 (BP Location: Right arm)   Pulse 104   Temp (!) 102.5 °F (39.2 °C) (Oral)   Resp 18   Ht 5' 9\" (1.753 m)   Wt 87 lb (39.5 kg)   SpO2 96%   BMI 12.85 kg/m²     Intake/Output:    Intake/Output Summary (Last 24 hours) at 6/3/2025 0628  Last data filed at 6/3/2025 0521  Gross per 24 hour   Intake 1170.81 ml   Output 1800 ml   Net -629.19 ml       Wt Readings from Last 3 Encounters:   25 87 lb (39.5 kg)   25 88 lb 2.9 oz (40 kg)   03/10/25 90 lb (40.8 kg)       Exam  Gen: No acute distress, alert and oriented x3,  HEENT: Pallor is noted  Pulm: Lungs clear, normal respiratory effort  CV: Heart with regular rate and rhythm, no peripheral edema  Abd: Abdomen soft, nontender, nondistended, no organomegaly, bowel sounds present  Skin: no rashes or lesions  CNS: Alert    Data Review:     Labs:   Lab Results   Component Value Date    WBC 25.9 2025    HGB 7.3 2025    HCT 23.3 2025    .0 2025    CREATSERUM 0.66 2025    BUN 11 2025     2025    K 4.1 2025     2025    CO2 21.0 2025    GLU 94 2025    CA 9.2 2025         LABS  Recent Labs   Lab 25  1736 25  0643 25  1555   RBC 3.29* 2.51*  --    HGB 8.0* 6.0* 7.3*   HCT 27.5* 20.8* 23.3*   MCV 83.6 82.9  --    MCH 24.3* 23.9*  --    MCHC 29.1* 28.8*  --    RDW 18.3* 17.5*  --    NEPRELIM 29.30* 22.66*  --    WBC 34.4* 25.9*  --    .0* 294.0  --    * 94  --        Imaging:      Meds:    Current Hospital Medications[1]    Assessment  Problem List[2]  Acute on chronic blood loss anemia  Plan:   Will transfuse 1 unit of packed red blood cells   Consulted colorectal surgery  Continue pain control.    Advanced anal cancer.  Patient seen seen by colorectal surgery as well as by the oncology.  Patient is not a candidate for any systemic therapy or any surgery at this time.    Hospice is recommended.  Patient has not decided about hospice care.        Discussed with palliative care physician also.  Prognosis is poor.      I had a lengthy discussion with the patient and advised him that RBC transfusion to 13 to 14 g is not recommended.  I recommended that can do blood test if the hemoglobin less than 7 we can transfuse him 1 unit of packed red blood cells    The patient is refusing blood draw.        Active Orders   LAB    CBC With Differential With Platelet     Frequency: Once     Number of Occurrences: 1 Occurrences    Comp Metabolic Panel (14)     Frequency: Once     Number of Occurrences: 1 Occurrences    Urinalysis with Culture Reflex     Frequency: Once     Number of Occurrences: 1 Occurrences   Nourishments    Dietary Nutrition Supplements BID     Frequency: BID     Number of Occurrences: Until Specified     Order Comments: AM: vanilla flavor  PM: zeus flavor     Diet    Regular/General diet Texture Consistency: Chopped / Soft & Bite Sized; Is Patient on Accuchecks? No     Frequency: Effective Now     Number of Occurrences: Until Specified     Order Comments: HALF PORTION MEALS.  Rush trays to be able to serve hot/warm meals to patient.     Nursing    CBC post transfusion (RN to enter order)     Frequency: Once     Number of Occurrences: 1 Occurrences    Hemoglobin post transfusion 45 min after transfusion is completed (RN to enter order)     Frequency: Once     Number of Occurrences: 1 Occurrences    Initiate electrolyte protocol     Frequency: Until Discontinued     Number of Occurrences: Until  Specified    Initiate transfusion reaction protocol: if patient exhibits signs/symptoms of transfusion reaction     Frequency: PRN     Number of Occurrences: Until Specified    Insert/maintain PIV     Frequency: PRN     Number of Occurrences: Until Specified    Notify physician     Frequency: Continuous     Number of Occurrences: Until Specified     Order Comments: Hold transfusion and notify physician if the patient:   1) Complains of chills an/or abdominal/back pain  2) Shortness of breath  3) Chest pain   4) Restlessness   5) Infusion site pain   6) Sudden changes in vital signs ie: decrease in blood pressure or temperature: elevation of one degree Centigrade or 2 degrees fahrenheit from pre-transfusion temperature  7) Nausea/vomiting   8) Shock   9) Change in color of urine   10) Urticaria   11) Edema        Provide patient/family transfusion information     Frequency: Once     Number of Occurrences: 1 Occurrences    Vital signs - Per blood administration policy     Frequency: Per Unit Routine     Number of Occurrences: Until Specified     Order Comments: Vital signs as follows; T, BP, HR, RR one set prior to transfusion, one set at 15 minutes, then one set hourly until transfusion complete. After transufusion complete resume previous VS frequency.       Code Status    DNAR/Comfort care Continuous     Frequency: Continuous     Number of Occurrences: Until Specified     Order Comments: Primary goal of maximizing comfort. Relieve pain & suffering through the use of medication by any route as needed; use oxygen, suctioning & manual treatment of airway obstruction.       Consult    Consult to General Surgery     Frequency: Once     Number of Occurrences: 1 Occurrences    Consult to General Surgery     Frequency: Once     Number of Occurrences: 1 Occurrences   Isolation    Enteric/Contact PLUS Isolation Continuous     Frequency: Continuous     Number of Occurrences: Until Specified   Medications    acetaminophen  (Tylenol Extra Strength) tab 1,000 mg     Frequency: Q6H PRN     Dose: 1,000 mg     Route: Oral    ceFEPIme (Maxipime) 2 g in sodium chloride 0.9% 100mL IVPB-KIARRA     Frequency: Q8H     Dose: 2 g     Route: Intravenous    HYDROmorphone (Dilaudid) 1 MG/ML injection 0.2 mg     Linked Order: Or     Frequency: Q2H PRN     Dose: 0.2 mg     Route: Intravenous    HYDROmorphone (Dilaudid) 1 MG/ML injection 0.4 mg     Linked Order: Or     Frequency: Q2H PRN     Dose: 0.4 mg     Route: Intravenous    HYDROmorphone (Dilaudid) 1 MG/ML injection 0.8 mg     Linked Order: Or     Frequency: Q2H PRN     Dose: 0.8 mg     Route: Intravenous    hydrOXYzine HCl (ATARAX) 10 MG/5ML syrup 10 mg     Frequency: TID     Dose: 10 mg     Route: Oral    lactulose (CHRONULAC) 10 GM/15ML solution 10 g     Frequency: BID     Dose: 10 g     Route: Oral    LORazepam (Ativan) 2 mg/mL injection 1 mg     Frequency: Q6H PRN     Dose: 1 mg     Route: Intravenous    methadone (Dolophine) 10 MG/5ML oral solution 2.5 mg     Frequency: BID     Dose: 2.5 mg     Route: Oral    morphINE PF 4 MG/ML injection 4 mg     Frequency: Q2H PRN     Dose: 4 mg     Route: Intravenous    sodium chloride 0.9% infusion     Frequency: Continuous     Route: Intravenous    thiamine (Vitamin B1) tab 100 mg     Frequency: Daily     Dose: 100 mg     Route: Oral       Lane Ontiveros MD  6/1/2025           [1]   Current Facility-Administered Medications   Medication Dose Route Frequency    ceFEPIme (Maxipime) 2 g in sodium chloride 0.9% 100mL IVPB-KIARRA  2 g Intravenous Q8H    methadone (Dolophine) 10 MG/5ML oral solution 2.5 mg  2.5 mg Oral BID    lactulose (CHRONULAC) 10 GM/15ML solution 10 g  10 g Oral BID    hydrOXYzine HCl (ATARAX) 10 MG/5ML syrup 10 mg  10 mg Oral TID    thiamine (Vitamin B1) tab 100 mg  100 mg Oral Daily    HYDROmorphone (Dilaudid) 1 MG/ML injection 0.2 mg  0.2 mg Intravenous Q2H PRN    Or    HYDROmorphone (Dilaudid) 1 MG/ML injection 0.4 mg  0.4 mg Intravenous  Q2H PRN    Or    HYDROmorphone (Dilaudid) 1 MG/ML injection 0.8 mg  0.8 mg Intravenous Q2H PRN    LORazepam (Ativan) 2 mg/mL injection 1 mg  1 mg Intravenous Q6H PRN    morphINE PF 4 MG/ML injection 4 mg  4 mg Intravenous Q2H PRN    acetaminophen (Tylenol Extra Strength) tab 1,000 mg  1,000 mg Oral Q6H PRN    sodium chloride 0.9% infusion   Intravenous Continuous   [2]   Patient Active Problem List  Diagnosis    Squamous cell cancer, anus (HCC)    Iron deficiency anemia due to chronic blood loss    Rectal bleeding    Cancer associated pain    Hyponatremia    Palliative care by specialist

## 2025-06-03 NOTE — PROGRESS NOTES
Patient was not seen but I have previously discussed best supportive care in clinic and extensive discussion with Palliative Care yesterday. Unfortunately, his disease has progressed to a point where therapy is no longer possible and he is not fit for systemic therapy. I continue to recommend best supportive care/hospice. If Joseph wishes to speak with me I am available, but I will otherwise sign off.     Deyvi Conn, DO

## 2025-06-03 NOTE — CM/SW NOTE
DAVID and SOLO Hart follow up with pt regarding hospice. Pt is not ready to make a hospice decision at this time and kindly dismissed us from his room. Pt still deciding on treatment options. RH will follow peripherally and will remain available if pt is ready to move forward with hospice.      DAVID Rowe  [facility]  [id number]

## 2025-06-03 NOTE — PLAN OF CARE
1u PRBCs today. PO and IVP morphine for pain. Atarax for itching. Refused K replacement. Refused IV abx, stated he wasn't aware the night RN began the infusion and requested it be stopped. Refused CT a&p today. Refused shower. Refused assessment/care of wound. Voiding via bedside urinal. No bm today. Continuous fluids stopped r/t edema in JOCELYN LE. Call light in reach and using appropriately.     Problem: Patient Centered Care  Goal: Patient preferences are identified and integrated in the patient's plan of care  Description: Interventions:  - What would you like us to know as we care for you? 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

## 2025-06-03 NOTE — PLAN OF CARE
Problem: Patient Centered Care  Goal: Patient preferences are identified and integrated in the patient's plan of care  Description: Interventions:  - What would you like us to know as we care for you? From home  - Provide timely, complete, and accurate information to patient/family  - Incorporate patient and family knowledge, values, beliefs, and cultural backgrounds into the planning and delivery of care  - Encourage patient/family to participate in care and decision-making at the level they choose  - Honor patient and family perspectives and choices  Outcome: Progressing     Problem: Patient/Family Goals  Goal: Patient/Family Long Term Goal  Description: Patient's Long Term Goal: to feel better    Interventions:  - Monitor vital signs and labs  - Administer medications per order  - Follow MD orders  - Fall precautions  - Diagnostics as ordered  - Update / inform patient on plan of care  - Discharge planning  - See additional Care Plan goals for specific interventions  Outcome: Progressing  Goal: Patient/Family Short Term Goal  Description: Patient's Short Term Goal: to go home    Interventions:   - Monitor vital signs and labs  - Administer medications per order  - Follow MD orders  - Fall precautions  - Diagnostics as ordered  - Update / inform patient on plan of care  - Discharge planning  - See additional Care Plan goals for specific interventions  Outcome: Progressing     Problem: GASTROINTESTINAL - ADULT  Goal: Maintains or returns to baseline bowel function  Description: INTERVENTIONS:  - Assess bowel function  - Maintain adequate hydration with IV or PO as ordered and tolerated  - Evaluate effectiveness of GI medications  - Encourage mobilization and activity  - Obtain nutritional consult as needed  - Establish a toileting routine/schedule  - Consider collaborating with pharmacy to review patient's medication profile  Outcome: Progressing  Goal: Maintains adequate nutritional intake  (undernourished)  Description: INTERVENTIONS:  - Monitor percentage of each meal consumed  - Identify factors contributing to decreased intake, treat as appropriate  - Assist with meals as needed  - Monitor I&O, WT and lab values  - Obtain nutritional consult as needed  - Optimize oral hygiene and moisture  - Encourage food from home; allow for food preferences  - Enhance eating environment  Outcome: Progressing     Problem: Impaired Functional Mobility  Goal: Achieve highest/safest level of mobility/gait  Description: Interventions:  - Assess patient's functional ability and stability  - Promote increasing activity/tolerance for mobility and gait  - Educate and engage patient/family in tolerated activity level and precautions    Outcome: Progressing

## 2025-06-03 NOTE — PLAN OF CARE
Report given to this RN by Tootie RN overnight. This RN resumed care for AM. B/p low and temp high (d/t bear hugger)- MD notified. IVF continued. Call light in reach and no needs noted at this time  Problem: Patient Centered Care  Goal: Patient preferences are identified and integrated in the patient's plan of care  Description: Interventions:  - What would you like us to know as we care for you?   - Provide timely, complete, and accurate information to patient/family  - Incorporate patient and family knowledge, values, beliefs, and cultural backgrounds into the planning and delivery of care  - Encourage patient/family to participate in care and decision-making at the level they choose  - Honor patient and family perspectives and choices  Outcome: Progressing     Problem: Patient/Family Goals  Goal: Patient/Family Long Term Goal  Description: Patient's Long Term Goal: to feel better    Interventions:  - Monitor vital signs and labs  - Administer medications per order  - Follow MD orders  - Fall precautions  - Diagnostics as ordered  - Update / inform patient on plan of care  - Discharge planning  - See additional Care Plan goals for specific interventions  Outcome: Progressing  Goal: Patient/Family Short Term Goal  Description: Patient's Short Term Goal: to go home    Interventions:   - Monitor vital signs and labs  - Administer medications per order  - Follow MD orders  - Fall precautions  - Diagnostics as ordered  - Update / inform patient on plan of care  - Discharge planning  - See additional Care Plan goals for specific interventions  Outcome: Progressing     Problem: GASTROINTESTINAL - ADULT  Goal: Maintains or returns to baseline bowel function  Description: INTERVENTIONS:  - Assess bowel function  - Maintain adequate hydration with IV or PO as ordered and tolerated  - Evaluate effectiveness of GI medications  - Encourage mobilization and activity  - Obtain nutritional consult as needed  - Establish a toileting  routine/schedule  - Consider collaborating with pharmacy to review patient's medication profile  Outcome: Progressing  Goal: Maintains adequate nutritional intake (undernourished)  Description: INTERVENTIONS:  - Monitor percentage of each meal consumed  - Identify factors contributing to decreased intake, treat as appropriate  - Assist with meals as needed  - Monitor I&O, WT and lab values  - Obtain nutritional consult as needed  - Optimize oral hygiene and moisture  - Encourage food from home; allow for food preferences  - Enhance eating environment  Outcome: Progressing     Problem: Impaired Functional Mobility  Goal: Achieve highest/safest level of mobility/gait  Description: Interventions:  - Assess patient's functional ability and stability  - Promote increasing activity/tolerance for mobility and gait  - Educate and engage patient/family in tolerated activity level and precautions  Outcome: Progressing

## 2025-06-03 NOTE — PROGRESS NOTES
Patient temp elevated at 0340. Patient has the bear hugger in place and RN educated patient on how the bear hugger can make a temperature read falsely. Patient stated they did not want the bear hugger removed d/t feeling okay now. RN stated she would lower the temp of the bear hugger and check back but not remove it completely.     When RN came back at 0427 to recheck b/p and temp b/p was the same and temp climbed to 102.6 from 102. When RN checked the bear hugger temp was back to it's original state. He stated he had someone come and adjust it. He stated he now feels \"the best he has felt in a while\" and doesn't want to mess with anything.

## 2025-06-04 NOTE — PROGRESS NOTES
[facility]  part of [facility]    Progress Note    Joseph Angel Patient Status:  Inpatient    1980 MRN [id number]   Location [facility] 4W/SW/SE Attending Lane Ontiveros MD   Hosp Day # 4 PCP None Pcp       SUBJECTIVE:  Patient tells me that he is feeling better.  When  I told him about the fever patient tells me that he is using the Tanesha hugger blanket, and any keeping it heart which makes him feel better patient tells me that he is using \"hyperthermia\" because cancer does not like it but his body likes it.  Patient told me that his reason his temperature was high  Denies any cough.  No chills.  No chest pain.        OBJECTIVE:  Vital signs in last 24 hours:  BP 94/65   Pulse 97   Temp 98 °F (36.7 °C) (Temporal)   Resp 18   Ht 5' 9\" (1.753 m)   Wt 87 lb (39.5 kg)   SpO2 100%   BMI 12.85 kg/m²     Intake/Output:    Intake/Output Summary (Last 24 hours) at 2025 0704  Last data filed at 6/3/2025 1800  Gross per 24 hour   Intake 401.67 ml   Output 1500 ml   Net -1098.33 ml       Wt Readings from Last 3 Encounters:   25 87 lb (39.5 kg)   25 88 lb 2.9 oz (40 kg)   03/10/25 90 lb (40.8 kg)       Exam  Gen: No acute distress, alert and oriented x3,  HEENT: Pallor is noted  Pulm: Lungs clear, normal respiratory effort  CV: Heart with regular rate and rhythm, no peripheral edema  Abd: Abdomen soft, nontender, nondistended, no organomegaly, bowel sounds present  Skin: no rashes or lesions  CNS: Alert    Data Review:     Labs:   Lab Results   Component Value Date    HGB 8.3 2025    HCT 27.0 2025    K 3.5 2025         LABS  Recent Labs   Lab 25  1736 25  0643 25  1555 25  0631 25  1538   RBC 3.29* 2.51*  --  2.71*  --    HGB 8.0* 6.0* 7.3* 6.9* 8.3*   HCT 27.5* 20.8* 23.3* 22.4* 27.0*   MCV 83.6 82.9  --  82.7  --    MCH 24.3* 23.9*  --  25.5*  --    MCHC 29.1* 28.8*  --  30.8*  --    RDW 18.3* 17.5*  --  17.2*  --     NEPRELIM 29.30* 22.66*  --  22.99*  --    WBC 34.4* 25.9*  --  26.2*  --    .0* 294.0  --  255.0  --    AST  --   --   --  13  --    ALT  --   --   --  7*  --    * 94  --  100*  --        Imaging:      Meds:   Current Hospital Medications[1]    Assessment  Problem List[2]  Acute on chronic blood loss anemia  Plan:   Will transfuse 1 unit of packed red blood cells   Consulted colorectal surgery  Continue pain control.  Patient not a candidate for surgical treatment    Advanced anal cancer.  Patient seen seen by colorectal surgery as well as by the oncology.  Patient is not a candidate for any systemic therapy or any surgery at this time.    Hospice is recommended.  Patient has not decided about hospice care.  Patient tells me that he is considering various aspects for going on hospice including insurance coverage and the payment to his caregivers        Discussed with palliative care physician also.  Prognosis is poor.      I had a lengthy discussion with the patient and advised him that RBC transfusion to 13 to 14 g is not recommended.  I recommended that can do blood test if the hemoglobin less than 7 we can transfuse him 1 unit of packed red blood cells    The patient is refusing blood draw.        Active Orders   Nourishments    Dietary Nutrition Supplements BID     Frequency: BID     Number of Occurrences: Until Specified     Order Comments: AM: vanilla flavor  PM: zeus flavor     Diet    Regular/General diet Texture Consistency: Regular; Is Patient on Accuchecks? No     Frequency: Effective Now     Number of Occurrences: Until Specified   Nursing    CBC post transfusion (RN to enter order)     Frequency: Once     Number of Occurrences: 1 Occurrences    CBC post transfusion (RN to enter order)     Frequency: Once     Number of Occurrences: 1 Occurrences    Hemoglobin post transfusion 45 min after transfusion is completed (RN to enter order)     Frequency: Once     Number of Occurrences: 1  Occurrences    Hemoglobin post transfusion 45 min after transfusion is completed (RN to enter order)     Frequency: Once     Number of Occurrences: 1 Occurrences    Initiate electrolyte protocol     Frequency: Until Discontinued     Number of Occurrences: Until Specified    Initiate transfusion reaction protocol: if patient exhibits signs/symptoms of transfusion reaction     Frequency: PRN     Number of Occurrences: Until Specified    Initiate transfusion reaction protocol: if patient exhibits signs/symptoms of transfusion reaction     Frequency: PRN     Number of Occurrences: Until Specified    Insert/maintain PIV     Frequency: PRN     Number of Occurrences: Until Specified    Insert/maintain PIV     Frequency: PRN     Number of Occurrences: Until Specified    Notify physician     Frequency: Continuous     Number of Occurrences: Until Specified     Order Comments: Hold transfusion and notify physician if the patient:   1) Complains of chills an/or abdominal/back pain  2) Shortness of breath  3) Chest pain   4) Restlessness   5) Infusion site pain   6) Sudden changes in vital signs ie: decrease in blood pressure or temperature: elevation of one degree Centigrade or 2 degrees fahrenheit from pre-transfusion temperature  7) Nausea/vomiting   8) Shock   9) Change in color of urine   10) Urticaria   11) Edema        Notify physician     Frequency: Continuous     Number of Occurrences: Until Specified     Order Comments: Hold transfusion and notify physician if the patient:   1) Complains of chills an/or abdominal/back pain  2) Shortness of breath  3) Chest pain   4) Restlessness   5) Infusion site pain   6) Sudden changes in vital signs ie: decrease in blood pressure or temperature: elevation of one degree Centigrade or 2 degrees fahrenheit from pre-transfusion temperature  7) Nausea/vomiting   8) Shock   9) Change in color of urine   10) Urticaria   11) Edema        Provide patient/family transfusion information      Frequency: Once     Number of Occurrences: 1 Occurrences    Provide patient/family transfusion information     Frequency: Once     Number of Occurrences: 1 Occurrences    Vital signs - Per blood administration policy     Frequency: Per Unit Routine     Number of Occurrences: Until Specified     Order Comments: Vital signs as follows; T, BP, HR, RR one set prior to transfusion, one set at 15 minutes, then one set hourly until transfusion complete. After transufusion complete resume previous VS frequency.        Vital signs - Per blood administration policy     Frequency: Per Unit Routine     Number of Occurrences: Until Specified     Order Comments: Vital signs as follows; T, BP, HR, RR one set prior to transfusion, one set at 15 minutes, then one set hourly until transfusion complete. After transufusion complete resume previous VS frequency.       Code Status    DNAR/Comfort care Continuous     Frequency: Continuous     Number of Occurrences: Until Specified     Order Comments: Primary goal of maximizing comfort. Relieve pain & suffering through the use of medication by any route as needed; use oxygen, suctioning & manual treatment of airway obstruction.       Consult    Consult to General Surgery     Frequency: Once     Number of Occurrences: 1 Occurrences    Consult to General Surgery     Frequency: Once     Number of Occurrences: 1 Occurrences   Isolation    Enteric/Contact PLUS Isolation Continuous     Frequency: Continuous     Number of Occurrences: Until Specified   Medications    acetaminophen (Ofirmev) 10 mg/mL infusion premix 600 mg     Frequency: Q8H PRN     Dose: 15 mg/kg     Route: Intravenous    acetaminophen (Tylenol Extra Strength) tab 1,000 mg     Frequency: Q6H PRN     Dose: 1,000 mg     Route: Oral    ceFEPIme (Maxipime) 2 g in sodium chloride 0.9% 100mL IVPB-KIARRA     Frequency: Q8H     Dose: 2 g     Route: Intravenous    hydrOXYzine HCl (ATARAX) 10 MG/5ML syrup 10 mg     Frequency: TID     Dose: 10  mg     Route: Oral    lactulose (CHRONULAC) 10 GM/15ML solution 10 g     Frequency: BID     Dose: 10 g     Route: Oral    LORazepam (Ativan) 2 mg/mL injection 1 mg     Frequency: Q6H PRN     Dose: 1 mg     Route: Intravenous    morphINE 10 MG/5ML oral solution 10 mg     Frequency: q6h     Dose: 10 mg     Route: Oral    morphINE PF 4 MG/ML injection 4 mg     Frequency: Q2H PRN     Dose: 4 mg     Route: Intravenous    ondansetron (Zofran) 4 MG/2ML injection 4 mg     Frequency: Q4H PRN     Dose: 4 mg     Route: Intravenous    potassium chloride 20 mEq/100mL IVPB premix 20 mEq     Frequency: Once     Dose: 20 mEq     Route: Intravenous     Order Comments: Administer through peripheral line    sodium chloride 0.9% infusion     Frequency: Continuous     Route: Intravenous    thiamine (Vitamin B1) tab 100 mg     Frequency: Daily     Dose: 100 mg     Route: Oral       Lane Ontiveros MD  6/1/2025           [1]   Current Facility-Administered Medications   Medication Dose Route Frequency    ceFEPIme (Maxipime) 2 g in sodium chloride 0.9% 100mL IVPB-KIARRA  2 g Intravenous Q8H    potassium chloride 20 mEq/100mL IVPB premix 20 mEq  20 mEq Intravenous Once    ondansetron (Zofran) 4 MG/2ML injection 4 mg  4 mg Intravenous Q4H PRN    acetaminophen (Ofirmev) 10 mg/mL infusion premix 600 mg  15 mg/kg Intravenous Q8H PRN    morphINE 10 MG/5ML oral solution 10 mg  10 mg Oral q6h    lactulose (CHRONULAC) 10 GM/15ML solution 10 g  10 g Oral BID    hydrOXYzine HCl (ATARAX) 10 MG/5ML syrup 10 mg  10 mg Oral TID    thiamine (Vitamin B1) tab 100 mg  100 mg Oral Daily    LORazepam (Ativan) 2 mg/mL injection 1 mg  1 mg Intravenous Q6H PRN    morphINE PF 4 MG/ML injection 4 mg  4 mg Intravenous Q2H PRN    acetaminophen (Tylenol Extra Strength) tab 1,000 mg  1,000 mg Oral Q6H PRN    sodium chloride 0.9% infusion   Intravenous Continuous   [2]   Patient Active Problem List  Diagnosis    Squamous cell cancer, anus (HCC)    Iron deficiency anemia  due to chronic blood loss    Rectal bleeding    Cancer associated pain    Hyponatremia    Palliative care by specialist

## 2025-06-04 NOTE — PROGRESS NOTES
Palliative Care Progress Note    Joseph Angel Patient Status:  Inpatient    1980 MRN [id number]   Location [facility] 4W/SW/SE Attending Lane Ontiveros MD   Hosp Day # 4 PCP None Pcp     Date of Consult: 2025  Patient seen at: [facility] Inpatient    Reason for Consultation: Consult requested for goals of care and care coordination.    Subjective     S: Pruritus greatly improved. Comfortable. Had been refusing oral morphine.     Review of Systems: Palliative Care symptom needs assessed:     Denies dypsnea.   Denies cough or lightheadedness/dizziness.   See below for current pain issues.   Minimal appetite  Denies n/v   See below for constipation/diarrhea issues.   No depression or anxiety.      Palliative Care Social History:   Marital Status: Single  Children: No  Living Situation Prior to Admit: Home  Occupational History: Working  Personal:     Spiritual  Joseph Angel [employer]    requested: No    Medical History: obtained from [employer]  Past Medical History[1]  Past Surgical History[2]    Family History: obtained from [employer]  Family History[3]    Allergies:  Allergies[4]    Medications:   Current Hospital Medications[5]  Prior to Admission Medications[6]      Palliative Performance Scale: 30 %  % Ambulation Activity Level Self-Care Intake Consciousness   100 Full  Normal  No Disease Full Normal Full   90 Full  Normal  Some Disease Full Normal Full   80 Full  Normal w/effort  Some Disease Full Normal or reduced Full   70 Reduced  Can't Perform Job  Some Disease Full Normal or reduced Full   60 Reduced  Can't Perform Hobby   Significant Disease Occ Assist Normal or reduced Full or confused   50 Mainly sit/lie Can't do any work  Extensive Disease Partial Assist Normal or reduced Full or confused   40 Mainly in bed Can't do any work  Extensive Disease Mainly Assist Normal or reduced Full or confused   30 Bed Bound Can't do any work  Extensive Disease Max Assist  Total Care Reduced   Drowsy/confused   20 Bed Bound Can't do any work  Extensive Disease Max Assist  Total Care Minimal  Drowsy/confused   10 Bed Bound Can't do any work  Extensive Disease Max Assist  Total Care Mouth Care  Drowsy/confused   0 Death        Objective      Vital Signs:  Blood pressure (!) 85/51, pulse 106, temperature 98 °F (36.7 °C), temperature source Oral, resp. rate 18, height 5' 9\" (1.753 m), weight 87 lb (39.5 kg), SpO2 99%.  Body mass index is 12.85 kg/m².  Non-verbal signs of pain present: No    Physical Exam:  General: Alert, awake and in no  apparent respiratory distress. Emaciated.  HEENT: MMM. No obvious focal deficits.   Cardiac: RRR  Lungs: Normal effort on RA  Abdomen: Soft, non-distended  Extremities:  Large pitting Edema present  Skin: Malodorous anal lesion    Hematology:  Lab Results   Component Value Date    WBC 26.2 (H) 06/03/2025    HGB 8.3 (L) 06/03/2025    HCT 27.0 (L) 06/03/2025    .0 06/03/2025       Coags:  Lab Results   Component Value Date    INR 1.05 03/10/2025    PTT 28.8 10/28/2024       Chemistry:  Lab Results   Component Value Date    CREATSERUM 0.52 (L) 06/03/2025    BUN 8 (L) 06/03/2025     (L) 06/03/2025    K 3.5 06/04/2025     06/03/2025    CO2 21.0 06/03/2025     (H) 06/03/2025    CA 8.5 (L) 06/03/2025    ALB 2.2 (L) 06/03/2025    ALKPHO 102 06/03/2025    BILT 0.5 06/03/2025    TP 4.7 (L) 06/03/2025    AST 13 06/03/2025    ALT 7 (L) 06/03/2025       Imaging:  No results found.    Assessment and Recommendations        Squamous cell cancer, anus (HCC)    Rectal bleeding    Cancer associated pain    Hyponatremia    Symptom Management      Pain:  -will start morphine liquid soln 10mg q6hr scheduled  -prn IV morphine 4mg will space out to q3  -will transition to oral pain meds asap    Nausea/Cachexia:  -complains of chronic dysphagia  -wants to bring in his own protein/tube feed shakes 'katefarms'    Edema  -complains of large pitting edema in his bilateral  LE  -recommend wean or saline lock IVFs     Prevention of Constipation:    - struggles with difficult bms due to tumor   - lactulose 10g BID    2.     Advanced Care Planning  A voluntarily discussion and explanation regarding Advance Care Planning (ACP) took place today with patient. We discussed the risks vs benefits of life sustaining treatments in the setting of Joseph Angel's comorbid medical conditions. Items addressed: patient preferences , code status , change in health status , end-of-life care plan, and general prognosis . This resulted in a better understanding of ACP documentation , a better understanding of pt's expressed wishes/preferences , and confirmation of code status .   Summary:     Total time dedicated to ACP >46 minutes .       3.     Serious Illness Conversation:   Code Status: DNAR  Patient is essentially estranged from his family  He defers to his friends listed as emergency surrogate decision-makers but he fails to see what they might be needed for, as he states his preference for DNR and that is all they need to know.   He states that he only wants to know about 2 medical treatments and that's it:  If a colorectal surgeon could do a local palliative debulking/resection  If he can get blood transfusions because 'normal is around 10mg/dl. And I haven't had an erection in months, you know, like normal men's health stuff.'  I asked about caregiving if he became weaker: he said he would want his friends to take him to an airbnb.   I will follow up on Dr. Conn's conversation. I advised him that a resection would not likely be offered as it would most likely not heal or require margins to accomplish. Not to mention his overall frailty.   I think he will need pain control and outpatient/nursing home care planning.  He WAS interested in inpatient hospice so I will alert [facility].   He is more comfortable today. He understands that he cannot have IV morphine at home. He is amenable to trial of  liquid morphine today. He is open to discussing home hospice. I reviewed that resection is not possible.  Spoke with Tiffanie More. She is Joseph's legal POA. She is a [employer] . I updated her on the clinical picture and advised hospice care at home. She agrees and will send referral for [facility] to contact her. She says that between herself and some other friends, they had been arranging caregiving for him. 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   I will follow along.     Palliative Care Follow Up: Palliative care team will Continue to follow while inpatient    A total of 70 minutes were spent on this visit, which included all of the following: direct face to face contact, history taking, physical examination, and >50% was spent counseling and coordinating care.    Cristiano Jauregui MD  Palliative Care Services  [facility] [phone]    Note to patient:  The 21st Century Cures Act makes medical notes like these available to patients in the interest of transparency. However, be advised this is a medical document. It is intended as peer to peer communication. It is written in medical language and may contain abbreviations or verbiage that are unfamiliar. It may appear blunt or direct. Medical documents are intended to carry relevant information, facts as evident, and the clinical opinion of the practitioner.           [1]   Past Medical History:   Anal cancer (HCC)   [2] History reviewed. No pertinent surgical history.  [3]   Family History  Problem Relation Age of Onset    Other (Hx of breast cancer) Mother    [4]   Allergies  Allergen Reactions    Shellfish Allergy HIVES    Shrimp RASH   [5]   Current Facility-Administered Medications:     piperacillin-tazobactam (Zosyn) 3.375 g in dextrose 5% 100 mL IVPB-ADDV, 3.375 g, Intravenous, Q8H    potassium chloride (Klor-Con) 20 MEQ oral powder 40 mEq, 40 mEq, Oral, Q4H     hydrOXYzine HCl (ATARAX) 10 MG/5ML syrup 10 mg, 10 mg, Oral, Q6H PRN    potassium chloride 20 mEq/100mL IVPB premix 20 mEq, 20 mEq, Intravenous, Once    ondansetron (Zofran) 4 MG/2ML injection 4 mg, 4 mg, Intravenous, Q4H PRN    acetaminophen (Ofirmev) 10 mg/mL infusion premix 600 mg, 15 mg/kg, Intravenous, Q8H PRN    morphINE 10 MG/5ML oral solution 10 mg, 10 mg, Oral, q6h    lactulose (CHRONULAC) 10 GM/15ML solution 10 g, 10 g, Oral, BID    thiamine (Vitamin B1) tab 100 mg, 100 mg, Oral, Daily    LORazepam (Ativan) 2 mg/mL injection 1 mg, 1 mg, Intravenous, Q6H PRN    morphINE PF 4 MG/ML injection 4 mg, 4 mg, Intravenous, Q2H PRN    acetaminophen (Tylenol Extra Strength) tab 1,000 mg, 1,000 mg, Oral, Q6H PRN    sodium chloride 0.9% infusion, , Intravenous, Continuous  [6]   No current outpatient medications on file.

## 2025-06-04 NOTE — PLAN OF CARE
Problem: GASTROINTESTINAL - ADULT  Goal: Maintains or returns to baseline bowel function  Description: INTERVENTIONS:  - Assess bowel function  - Maintain adequate hydration with IV or PO as ordered and tolerated  - Evaluate effectiveness of GI medications  - Encourage mobilization and activity  - Obtain nutritional consult as needed  - Establish a toileting routine/schedule  - Consider collaborating with pharmacy to review patient's medication profile  6/4/2025 0349 by Cristiano Hillman RN  Outcome: Progressing  6/4/2025 0349 by Cristiano Hillman RN  Outcome: Progressing  Goal: Maintains adequate nutritional intake (undernourished)  Description: INTERVENTIONS:  - Monitor percentage of each meal consumed  - Identify factors contributing to decreased intake, treat as appropriate  - Assist with meals as needed  - Monitor I&O, WT and lab values  - Obtain nutritional consult as needed  - Optimize oral hygiene and moisture  - Encourage food from home; allow for food preferences  - Enhance eating environment  6/4/2025 0349 by Cristiano Hillman RN  Outcome: Not Progressing  6/4/2025 0349 by Cristiano Hillman RN  Outcome: Not Progressing     Problem: PAIN - ADULT  Goal: Verbalizes/displays adequate comfort level or patient's stated pain goal  Description: INTERVENTIONS:  - Encourage pt to monitor pain and request assistance  - Assess pain using appropriate pain scale  - Administer analgesics based on type and severity of pain and evaluate response  - Implement non-pharmacological measures as appropriate and evaluate response  - Consider cultural and social influences on pain and pain management  - Manage/alleviate anxiety  - Utilize distraction and/or relaxation techniques  - Monitor for opioid side effects  - Notify MD/LIP if interventions unsuccessful or patient reports new pain  - Anticipate increased pain with activity and pre-medicate as appropriate  Outcome: Progressing

## 2025-06-04 NOTE — PLAN OF CARE
A&Ox4, poor appetite/tolerating liquids, refusing antibiotics/potassium replacement/reposition, scd, iv morphine, refused oral morphine, plan for [facility]/home with family member.    Problem: Patient Centered Care  Goal: Patient preferences are identified and integrated in the patient's plan of care  Description: Interventions:  - What would you like us to know as we care for you? -  - Provide timely, complete, and accurate information to patient/family  - Incorporate patient and family knowledge, values, beliefs, and cultural backgrounds into the planning and delivery of care  - Encourage patient/family to participate in care and decision-making at the level they choose  - Honor patient and family perspectives and choices  Outcome: Progressing     Problem: Patient/Family Goals  Goal: Patient/Family Long Term Goal  Description: Patient's Long Term Goal: to feel better    Interventions:  - Monitor vital signs and labs  - Administer medications per order  - Follow MD orders  - Fall precautions  - Diagnostics as ordered  - Update / inform patient on plan of care  - Discharge planning  - See additional Care Plan goals for specific interventions  Outcome: Progressing  Goal: Patient/Family Short Term Goal  Description: Patient's Short Term Goal: to go home    Interventions:   - Monitor vital signs and labs  - Administer medications per order  - Follow MD orders  - Fall precautions  - Diagnostics as ordered  - Update / inform patient on plan of care  - Discharge planning  - See additional Care Plan goals for specific interventions  Outcome: Progressing     Problem: GASTROINTESTINAL - ADULT  Goal: Maintains or returns to baseline bowel function  Description: INTERVENTIONS:  - Assess bowel function  - Maintain adequate hydration with IV or PO as ordered and tolerated  - Evaluate effectiveness of GI medications  - Encourage mobilization and activity  - Obtain nutritional consult as needed  - Establish a toileting  routine/schedule  - Consider collaborating with pharmacy to review patient's medication profile  Outcome: Progressing  Goal: Maintains adequate nutritional intake (undernourished)  Description: INTERVENTIONS:  - Monitor percentage of each meal consumed  - Identify factors contributing to decreased intake, treat as appropriate  - Assist with meals as needed  - Monitor I&O, WT and lab values  - Obtain nutritional consult as needed  - Optimize oral hygiene and moisture  - Encourage food from home; allow for food preferences  - Enhance eating environment  Outcome: Progressing     Problem: Impaired Functional Mobility  Goal: Achieve highest/safest level of mobility/gait  Description: Interventions:  - Assess patient's functional ability and stability  - Promote increasing activity/tolerance for mobility and gait  - Educate and engage patient/family in tolerated activity level and precautions  -  Outcome: Progressing     Problem: PAIN - ADULT  Goal: Verbalizes/displays adequate comfort level or patient's stated pain goal  Description: INTERVENTIONS:  - Encourage pt to monitor pain and request assistance  - Assess pain using appropriate pain scale  - Administer analgesics based on type and severity of pain and evaluate response  - Implement non-pharmacological measures as appropriate and evaluate response  - Consider cultural and social influences on pain and pain management  - Manage/alleviate anxiety  - Utilize distraction and/or relaxation techniques  - Monitor for opioid side effects  - Notify MD/LIP if interventions unsuccessful or patient reports new pain  - Anticipate increased pain with activity and pre-medicate as appropriate  Outcome: Progressing

## 2025-06-04 NOTE — CM/SW NOTE
Patient discussed in RN DC rounds. SW was informed that there is POA and patient/POA wishes to have referral sent to [facility]. SW asked DSC to send a referral to [facility] via [facility].       SW/CM to remain available for support and/or discharge planning.     Arielle Longo, MSW, LSW   x [phone]

## 2025-06-04 NOTE — CM/SW NOTE
Department  notified of request for Hospice, [facility] referrals started. Assigned CM/SW to follow up with pt/family on further discharge planning.     Ruby Hoff, DSC

## 2025-06-05 NOTE — PROGRESS NOTES
Palliative Care Progress Note    Joseph Angel Patient Status:  Inpatient    1980 MRN [id number]   Location [facility] 4W/SW/SE Attending Lane Ontiveros MD   Hosp Day # 5 PCP None Pcp     Date of Consult: 2025  Patient seen at: [facility]    Reason for Consultation: Consult requested for goals of care and care coordination.    Subjective     S: Said he did not tolerate oral morphine (stuck in throat). Asking for just prn IV. Nauseated. Pain with trying to move his legs.     Review of Systems: Palliative Care symptom needs assessed:     Denies dypsnea.   Denies cough or lightheadedness/dizziness.   See below for current pain issues.   Minimal appetite  Denies n/v   See below for constipation/diarrhea issues.   No depression or anxiety.      Palliative Care Social History:   Marital Status: Single  Children: No  Living Situation Prior to Admit: Home  Occupational History: Working  Personal:     Spiritual  Joseph Angel [employer]    requested: No    Medical History: obtained from [place]  Past Medical History[1]  Past Surgical History[2]    Family History: obtained from [place]  Family History[3]    Allergies:  Allergies[4]    Medications:   Current Hospital Medications[5]  Prior to Admission Medications[6]      Palliative Performance Scale: 30 %  % Ambulation Activity Level Self-Care Intake Consciousness   100 Full  Normal  No Disease Full Normal Full   90 Full  Normal  Some Disease Full Normal Full   80 Full  Normal w/effort  Some Disease Full Normal or reduced Full   70 Reduced  Can't Perform Job  Some Disease Full Normal or reduced Full   60 Reduced  Can't Perform Hobby   Significant Disease Occ Assist Normal or reduced Full or confused   50 Mainly sit/lie Can't do any work  Extensive Disease Partial Assist Normal or reduced Full or confused   40 Mainly in bed Can't do any work  Extensive Disease Mainly Assist Normal or reduced Full or confused   30 Bed Bound Can't do any  work  Extensive Disease Max Assist  Total Care Reduced  Drowsy/confused   20 Bed Bound Can't do any work  Extensive Disease Max Assist  Total Care Minimal  Drowsy/confused   10 Bed Bound Can't do any work  Extensive Disease Max Assist  Total Care Mouth Care  Drowsy/confused   0 Death        Objective      Vital Signs:  Blood pressure 97/59, pulse 90, temperature 98.9 °F (37.2 °C), temperature source Oral, resp. rate 14, height 5' 9\" (1.753 m), weight 87 lb (39.5 kg), SpO2 99%.  Body mass index is 12.85 kg/m².  Non-verbal signs of pain present: No    Physical Exam:  General: Alert, awake and in no  apparent respiratory distress. Emaciated.  HEENT: MMM. No obvious focal deficits.   Cardiac: RRR  Lungs: Normal effort on RA  Abdomen: Soft, non-distended  Extremities:  Large pitting Edema present  Skin: Malodorous anal lesion    Hematology:  Lab Results   Component Value Date    WBC 20.6 (H) 06/05/2025    HGB 8.9 (L) 06/05/2025    HCT 29.4 (L) 06/05/2025    .0 06/05/2025       Coags:  Lab Results   Component Value Date    INR 1.05 03/10/2025    PTT 28.8 10/28/2024       Chemistry:  Lab Results   Component Value Date    CREATSERUM 0.63 (L) 06/05/2025    BUN 9 06/05/2025     (L) 06/05/2025    K 3.5 06/05/2025     06/05/2025    CO2 21.0 06/05/2025     (H) 06/05/2025    CA 9.2 06/05/2025    ALB 2.2 (L) 06/03/2025    ALKPHO 102 06/03/2025    BILT 0.5 06/03/2025    TP 4.7 (L) 06/03/2025    AST 13 06/03/2025    ALT 7 (L) 06/03/2025       Imaging:  No results found.    Assessment and Recommendations        Squamous cell cancer, anus (HCC)    Rectal bleeding    Cancer associated pain    Hyponatremia    Symptom Management      Pain:  -prn IV morphine 4mg   -I offered fentanyl patch, he does not want that  -may need cadd pump for morphine    Nausea/Cachexia:  -complains of chronic dysphagia  -wants to bring in his own protein/tube feed shakes 'katefarms'  -zofran odt 8mg prn    Edema  -complains of large  pitting edema in his bilateral LE  -recommend wean or saline lock IVFs     Prevention of Constipation:    - struggles with difficult bms due to tumor   - lactulose 10g BID    2.     Advanced Care Planning  A voluntarily discussion and explanation regarding Advance Care Planning (ACP) took place today with patient. We discussed the risks vs benefits of life sustaining treatments in the setting of Joseph Angel's comorbid medical conditions. Items addressed: patient preferences , code status , change in health status , end-of-life care plan, and general prognosis . This resulted in a better understanding of ACP documentation , a better understanding of pt's expressed wishes/preferences , and confirmation of code status .   Summary:     Total time dedicated to ACP >46 minutes .       3.     Serious Illness Conversation:   Code Status: DNAR  Patient is essentially estranged from his family  He defers to his friends listed as emergency surrogate decision-makers but he fails to see what they might be needed for, as he states his preference for DNR and that is all they need to know.   He states that he only wants to know about 2 medical treatments and that's it:  If a colorectal surgeon could do a local palliative debulking/resection  If he can get blood transfusions because 'normal is around 10mg/dl. And I haven't had an erection in months, you know, like normal men's health stuff.'  I asked about caregiving if he became weaker: he said he would want his friends to take him to an airbnb.   I will follow up on Dr. Conn's conversation. I advised him that a resection would not likely be offered as it would most likely not heal or require margins to accomplish. Not to mention his overall frailty.   I think he will need pain control and outpatient/long term care planning.  He WAS interested in inpatient hospice so I will alert [facility].   He is more comfortable today. He understands that he cannot have IV morphine at home.  He is amenable to trial of liquid morphine today. He is open to discussing home hospice. I reviewed that resection is not possible.  Spoke with Tiffanie More. She is Joseph's legal POA. She is a [place] . I updated her on the clinical picture and advised hospice care at home. She agrees and will send referral for Renny to contact her. She says that between herself and some other friends, they had been arranging caregiving for him. 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   I will follow along.   Renny to contact Tiffanie to discuss discharge options.    Palliative Care Follow Up: Palliative care team will Continue to follow while inpatient    A total of 80 minutes were spent on this visit, which included all of the following: direct face to face contact, history taking, physical examination, and >50% was spent counseling and coordinating care.    Cristiano Jauregui MD  Palliative Care Services  [facility] [phone]    Note to patient:  The 21st Century Cures Act makes medical notes like these available to patients in the interest of transparency. However, be advised this is a medical document. It is intended as peer to peer communication. It is written in medical language and may contain abbreviations or verbiage that are unfamiliar. It may appear blunt or direct. Medical documents are intended to carry relevant information, facts as evident, and the clinical opinion of the practitioner.           [1]   Past Medical History:   Anal cancer (HCC)   [2] History reviewed. No pertinent surgical history.  [3]   Family History  Problem Relation Age of Onset    Other (Hx of breast cancer) Mother    [4]   Allergies  Allergen Reactions    Shellfish Allergy HIVES    Shrimp RASH   [5]   Current Facility-Administered Medications:     ondansetron (Zofran-ODT) disintegrating tab 8 mg, 8 mg, Oral, Q6H PRN    morphINE PF 2 MG/ML injection 2 mg,  2 mg, Intravenous, Once    hydrOXYzine HCl (ATARAX) 10 MG/5ML syrup 10 mg, 10 mg, Oral, Q6H PRN    acetaminophen (Ofirmev) 10 mg/mL infusion premix 600 mg, 15 mg/kg, Intravenous, Q8H PRN    lactulose (CHRONULAC) 10 GM/15ML solution 10 g, 10 g, Oral, BID    thiamine (Vitamin B1) tab 100 mg, 100 mg, Oral, Daily    LORazepam (Ativan) 2 mg/mL injection 1 mg, 1 mg, Intravenous, Q6H PRN    morphINE PF 4 MG/ML injection 4 mg, 4 mg, Intravenous, Q2H PRN    acetaminophen (Tylenol Extra Strength) tab 1,000 mg, 1,000 mg, Oral, Q6H PRN  [6]   No current outpatient medications on file.

## 2025-06-05 NOTE — PLAN OF CARE
A&Ox4, poor appetite, iv morphine for pain control, no complaints of nausea, patient refused assessment/care of wound, voiding per urinal, refused iv abx, friend came in and massaged legs, POA at bedside, plan to have [facility] and discharge home.      Problem: Patient Centered Care  Goal: Patient preferences are identified and integrated in the patient's plan of care  Description: Interventions:  - What would you like us to know as we care for you? -  - Provide timely, complete, and accurate information to patient/family  - Incorporate patient and family knowledge, values, beliefs, and cultural backgrounds into the planning and delivery of care  - Encourage patient/family to participate in care and decision-making at the level they choose  - Honor patient and family perspectives and choices  Outcome: Progressing     Problem: Patient/Family Goals  Goal: Patient/Family Long Term Goal  Description: Patient's Long Term Goal: to feel better    Interventions:  - Monitor vital signs and labs  - Administer medications per order  - Follow MD orders  - Fall precautions  - Diagnostics as ordered  - Update / inform patient on plan of care  - Discharge planning  - See additional Care Plan goals for specific interventions  Outcome: Progressing  Goal: Patient/Family Short Term Goal  Description: Patient's Short Term Goal: to go home    Interventions:   - Monitor vital signs and labs  - Administer medications per order  - Follow MD orders  - Fall precautions  - Diagnostics as ordered  - Update / inform patient on plan of care  - Discharge planning  - See additional Care Plan goals for specific interventions  Outcome: Progressing     Problem: GASTROINTESTINAL - ADULT  Goal: Maintains or returns to baseline bowel function  Description: INTERVENTIONS:  - Assess bowel function  - Maintain adequate hydration with IV or PO as ordered and tolerated  - Evaluate effectiveness of GI medications  - Encourage mobilization and activity  -  Obtain nutritional consult as needed  - Establish a toileting routine/schedule  - Consider collaborating with pharmacy to review patient's medication profile  Outcome: Progressing  Goal: Maintains adequate nutritional intake (undernourished)  Description: INTERVENTIONS:  - Monitor percentage of each meal consumed  - Identify factors contributing to decreased intake, treat as appropriate  - Assist with meals as needed  - Monitor I&O, WT and lab values  - Obtain nutritional consult as needed  - Optimize oral hygiene and moisture  - Encourage food from home; allow for food preferences  - Enhance eating environment  Outcome: Progressing     Problem: Impaired Functional Mobility  Goal: Achieve highest/safest level of mobility/gait  Description: Interventions:  - Assess patient's functional ability and stability  - Promote increasing activity/tolerance for mobility and gait  - Educate and engage patient/family in tolerated activity level and precautions  -  Outcome: Progressing     Problem: PAIN - ADULT  Goal: Verbalizes/displays adequate comfort level or patient's stated pain goal  Description: INTERVENTIONS:  - Encourage pt to monitor pain and request assistance  - Assess pain using appropriate pain scale  - Administer analgesics based on type and severity of pain and evaluate response  - Implement non-pharmacological measures as appropriate and evaluate response  - Consider cultural and social influences on pain and pain management  - Manage/alleviate anxiety  - Utilize distraction and/or relaxation techniques  - Monitor for opioid side effects  - Notify MD/LIP if interventions unsuccessful or patient reports new pain  - Anticipate increased pain with activity and pre-medicate as appropriate  Outcome: Progressing

## 2025-06-05 NOTE — PROGRESS NOTES
[facility]  part of [facility]    Progress Note    Joseph Angel Patient Status:  Inpatient    1980 MRN [id number]   Location [facility] 4W/SW/SE Attending Lane Ontiveros MD   Hosp Day # 5 PCP None Pcp       SUBJECTIVE:  Feeling okay.  Patient is considering hospice care.  But tells me that it is not set in cement  Patient's power of  Ms. Moreno is in the room also        OBJECTIVE:  Vital signs in last 24 hours:  BP 93/51 (BP Location: Right arm)   Pulse 94   Temp 98.3 °F (36.8 °C) (Oral)   Resp 14   Ht 5' 9\" (1.753 m)   Wt 87 lb (39.5 kg)   SpO2 100%   BMI 12.85 kg/m²     Intake/Output:    Intake/Output Summary (Last 24 hours) at 2025 1741  Last data filed at 2025 1100  Gross per 24 hour   Intake --   Output 550 ml   Net -550 ml       Wt Readings from Last 3 Encounters:   25 87 lb (39.5 kg)   25 88 lb 2.9 oz (40 kg)   03/10/25 90 lb (40.8 kg)       Exam  Gen: No acute distress, alert and oriented x3,  HEENT: Pallor is noted  Pulm: Lungs clear, normal respiratory effort  CV: Heart with regular rate and rhythm, no peripheral edema  Abd: Abdomen soft, nontender, nondistended, no organomegaly, bowel sounds present  Skin: no rashes or lesions  CNS: Alert    Data Review:     Labs:   Lab Results   Component Value Date    WBC 20.6 2025    HGB 8.9 2025    HCT 29.4 2025    .0 2025    CREATSERUM 0.63 2025    BUN 9 2025     2025    K 3.5 2025     2025    CO2 21.0 2025     2025    CA 9.2 2025         LABS  Recent Labs   Lab 25  0643 25  1555 25  0631 25  1538 25  0839 25  0840   RBC 2.51*  --  2.71*  --   --  3.59*   HGB 6.0*   < > 6.9* 8.3*  --  8.9*   HCT 20.8*   < > 22.4* 27.0*  --  29.4*   MCV 82.9  --  82.7  --   --  81.9   MCH 23.9*  --  25.5*  --   --  24.8*   MCHC 28.8*  --  30.8*  --   --  30.3*   RDW 17.5*  --   17.2*  --   --  17.7*   NEPRELIM 22.66*  --  22.99*  --   --  18.32*   WBC 25.9*  --  26.2*  --   --  20.6*   .0  --  255.0  --   --  261.0   AST  --   --  13  --   --   --    ALT  --   --  7*  --   --   --    GLU 94  --  100*  --  148*  --     < > = values in this interval not displayed.       Imaging:      Meds:   Current Hospital Medications[1]    Assessment  Problem List[2]  Acute on chronic blood loss anemia  Plan:   Will transfuse 1 unit of packed red blood cells     Advanced anal cancer  Consulted colorectal surgery  Continue pain control.  Patient not a candidate for surgical treatment    Advanced anal cancer.  Patient seen seen by colorectal surgery as well as by the oncology.  Patient is not a candidate for any systemic therapy or any surgery at this time.    Hospice is recommended.  Patient has not decided about hospice care.  Patient tells me that he is considering various aspects for going on hospice including insurance coverage and the payment to his caregivers        Discussed with palliative care physician also.  Prognosis is poor.    I discussed with the nursing staff.  Discussed with the patient power of     My nurse called me and informed me that patient does not want to sign the DNR form but he wants to be DNR comfort care.  And according to the palliative care physician patient wants to be DNR.  And the palliative care physician wants to keep him DNR comfort care only.  Patient will also be evaluated by hospice.    Active Orders   Nourishments    Dietary Nutrition Supplements BID     Frequency: BID     Number of Occurrences: Until Specified     Order Comments: AM: vanilla flavor  PM: zeus flavor     Diet    Regular/General diet Texture Consistency: Regular; Is Patient on Accuchecks? No     Frequency: Effective Now     Number of Occurrences: Until Specified   Nursing    Hemoglobin post transfusion 45 min after transfusion is completed (RN to enter order)     Frequency: Once      Number of Occurrences: 1 Occurrences    Hemoglobin post transfusion 45 min after transfusion is completed (RN to enter order)     Frequency: Once     Number of Occurrences: 1 Occurrences    Initiate transfusion reaction protocol: if patient exhibits signs/symptoms of transfusion reaction     Frequency: PRN     Number of Occurrences: Until Specified    Initiate transfusion reaction protocol: if patient exhibits signs/symptoms of transfusion reaction     Frequency: PRN     Number of Occurrences: Until Specified    Insert/maintain PIV     Frequency: PRN     Number of Occurrences: Until Specified    Insert/maintain PIV     Frequency: PRN     Number of Occurrences: Until Specified    Notify physician     Frequency: Continuous     Number of Occurrences: Until Specified     Order Comments: Hold transfusion and notify physician if the patient:   1) Complains of chills an/or abdominal/back pain  2) Shortness of breath  3) Chest pain   4) Restlessness   5) Infusion site pain   6) Sudden changes in vital signs ie: decrease in blood pressure or temperature: elevation of one degree Centigrade or 2 degrees fahrenheit from pre-transfusion temperature  7) Nausea/vomiting   8) Shock   9) Change in color of urine   10) Urticaria   11) Edema        Notify physician     Frequency: Continuous     Number of Occurrences: Until Specified     Order Comments: Hold transfusion and notify physician if the patient:   1) Complains of chills an/or abdominal/back pain  2) Shortness of breath  3) Chest pain   4) Restlessness   5) Infusion site pain   6) Sudden changes in vital signs ie: decrease in blood pressure or temperature: elevation of one degree Centigrade or 2 degrees fahrenheit from pre-transfusion temperature  7) Nausea/vomiting   8) Shock   9) Change in color of urine   10) Urticaria   11) Edema        Provide patient/family transfusion information     Frequency: Once     Number of Occurrences: 1 Occurrences    Provide patient/family  transfusion information     Frequency: Once     Number of Occurrences: 1 Occurrences    Vital signs - Per blood administration policy     Frequency: Per Unit Routine     Number of Occurrences: Until Specified     Order Comments: Vital signs as follows; T, BP, HR, RR one set prior to transfusion, one set at 15 minutes, then one set hourly until transfusion complete. After transufusion complete resume previous VS frequency.        Vital signs - Per blood administration policy     Frequency: Per Unit Routine     Number of Occurrences: Until Specified     Order Comments: Vital signs as follows; T, BP, HR, RR one set prior to transfusion, one set at 15 minutes, then one set hourly until transfusion complete. After transufusion complete resume previous VS frequency.       Code Status    DNAR/Comfort care Continuous     Frequency: Continuous     Number of Occurrences: Until Specified     Order Comments: Primary goal of maximizing comfort. Relieve pain & suffering through the use of medication by any route as needed; use oxygen, suctioning & manual treatment of airway obstruction.       Consult    Consult to General Surgery     Frequency: Once     Number of Occurrences: 1 Occurrences    Consult to General Surgery     Frequency: Once     Number of Occurrences: 1 Occurrences    Consult to Hospice     Frequency: Once     Number of Occurrences: 1 Occurrences   Isolation    Enteric/Contact PLUS Isolation Continuous     Frequency: Continuous     Number of Occurrences: Until Specified   Medications    acetaminophen (Ofirmev) 10 mg/mL infusion premix 600 mg     Frequency: Q8H PRN     Dose: 15 mg/kg     Route: Intravenous    acetaminophen (Tylenol Extra Strength) tab 1,000 mg     Frequency: Q6H PRN     Dose: 1,000 mg     Route: Oral    hydrOXYzine HCl (ATARAX) 10 MG/5ML syrup 10 mg     Frequency: Q6H PRN     Dose: 10 mg     Route: Oral    lactulose (CHRONULAC) 10 GM/15ML solution 10 g     Frequency: BID     Dose: 10 g     Route:  Oral    LORazepam (Ativan) 2 mg/mL injection 1 mg     Frequency: Q6H PRN     Dose: 1 mg     Route: Intravenous    morphINE PF 4 MG/ML injection 4 mg     Frequency: Q2H PRN     Dose: 4 mg     Route: Intravenous    ondansetron (Zofran-ODT) disintegrating tab 8 mg     Frequency: Q6H PRN     Dose: 8 mg     Route: Oral    thiamine (Vitamin B1) tab 100 mg     Frequency: Daily     Dose: 100 mg     Route: Oral       Lane Ontiveros MD  6/1/2025           [1]   Current Facility-Administered Medications   Medication Dose Route Frequency    ondansetron (Zofran-ODT) disintegrating tab 8 mg  8 mg Oral Q6H PRN    hydrOXYzine HCl (ATARAX) 10 MG/5ML syrup 10 mg  10 mg Oral Q6H PRN    acetaminophen (Ofirmev) 10 mg/mL infusion premix 600 mg  15 mg/kg Intravenous Q8H PRN    lactulose (CHRONULAC) 10 GM/15ML solution 10 g  10 g Oral BID    thiamine (Vitamin B1) tab 100 mg  100 mg Oral Daily    LORazepam (Ativan) 2 mg/mL injection 1 mg  1 mg Intravenous Q6H PRN    morphINE PF 4 MG/ML injection 4 mg  4 mg Intravenous Q2H PRN    acetaminophen (Tylenol Extra Strength) tab 1,000 mg  1,000 mg Oral Q6H PRN   [2]   Patient Active Problem List  Diagnosis    Squamous cell cancer, anus (HCC)    Iron deficiency anemia due to chronic blood loss    Rectal bleeding    Cancer associated pain    Hyponatremia    Palliative care by specialist

## 2025-06-05 NOTE — PROGRESS NOTES
[facility]  Progress Note    Joseph Angel Patient Status:  Inpatient    1980 MRN [id number]   Location [facility] 4W/SW/SE Attending Lane Ontiveros MD   Hosp Day # 5 PCP None Pcp     Rectal pain with bleeding  Hx neglected anal SCC  Subjective:  VSS  Patient states he does not want a CT scan. Lying comfortably in bed at present time.    Objective/Physical Exam:  General: Alert, orientated x3.  Cooperative.  No apparent distress.  Vital Signs:  Blood pressure 93/51, pulse 94, temperature 98.3 °F (36.8 °C), temperature source Oral, resp. rate 14, height 1.753 m (5' 9\"), weight 39.5 kg (87 lb), SpO2 100%.  Wt Readings from Last 3 Encounters:   25 39.5 kg (87 lb)   25 40 kg (88 lb 2.9 oz)   03/10/25 40.8 kg (90 lb)     Patient declined PE    Intake/Output:    Intake/Output Summary (Last 24 hours) at 2025 1606  Last data filed at 2025 1100  Gross per 24 hour   Intake --   Output 550 ml   Net -550 ml     I/O last 3 completed shifts:  In: 100 [P.O.:100]  Out: 840 [Urine:840]  I/O this shift:  In: -   Out: 250 [Urine:250]    Medications: Scheduled Medications[1]    Labs:  Lab Results   Component Value Date    WBC 20.6 2025    HGB 8.9 2025    HCT 29.4 2025    .0 2025     Lab Results   Component Value Date     2025    K 3.5 2025     2025    CO2 21.0 2025    BUN 9 2025    CREATSERUM 0.63 2025     2025    CA 9.2 2025     Lab Results   Component Value Date    INR 1.05 03/10/2025    INR 0.95 10/28/2024         Assessment  Problem List[2]    Patient has declined further imaging or discussion of surgical options  Palliative and hospice teams working patient at present time  Surgical oncology will sign off  Please reach out with questions or concerns    Discussed with HATTIE Jerome  Department of Surgical Oncology  [address]  [place]  [address]  [facility]  [address] [place] [address]  T: [phone]  F: [phone]      Quality:  DVT Mechanical Prophylaxis:        DVT Pharmacologic Prophylaxis   Medication   None                Code Status: DNAR/Comfort Care  Vasquez: No urinary catheter in place  Vasquez Duration (in days):   Central line:    RICHI: 6/6/2025                       [1]    lactulose  10 g Oral BID    thiamine  100 mg Oral Daily   [2]   Patient Active Problem List  Diagnosis    Squamous cell cancer, anus (HCC)    Iron deficiency anemia due to chronic blood loss    Rectal bleeding    Cancer associated pain    Hyponatremia    Palliative care by specialist

## 2025-06-05 NOTE — PLAN OF CARE
Problem: GASTROINTESTINAL - ADULT  Goal: Maintains or returns to baseline bowel function  Description: INTERVENTIONS:  - Assess bowel function  - Maintain adequate hydration with IV or PO as ordered and tolerated  - Evaluate effectiveness of GI medications  - Encourage mobilization and activity  - Obtain nutritional consult as needed  - Establish a toileting routine/schedule  - Consider collaborating with pharmacy to review patient's medication profile  Outcome: Progressing     Problem: PAIN - ADULT  Goal: Verbalizes/displays adequate comfort level or patient's stated pain goal  Description: INTERVENTIONS:  - Encourage pt to monitor pain and request assistance  - Assess pain using appropriate pain scale  - Administer analgesics based on type and severity of pain and evaluate response  - Implement non-pharmacological measures as appropriate and evaluate response  - Consider cultural and social influences on pain and pain management  - Manage/alleviate anxiety  - Utilize distraction and/or relaxation techniques  - Monitor for opioid side effects  - Notify MD/LIP if interventions unsuccessful or patient reports new pain  - Anticipate increased pain with activity and pre-medicate as appropriate  Outcome: Progressing

## 2025-06-05 NOTE — PAYOR COMM NOTE
--------------  CONTINUED STAY REVIEW  6/4  Payor: [facility]  Subscriber #:  [id number]  Authorization Number: [id number]    Admit date: 5/31/25  Admit time: 10:59 PM    REVIEW DOCUMENTATION:  6/4    When  I told him about the fever patient tells me that he is using the Tanesha hugger blanket, and any keeping it heart which makes him feel better patient tells me that he is using \"hyperthermia\" because cancer does not like it but his body likes it.  Patient told me that his reason his temperature was high       BP 94/65   Pulse 97   Temp 98 °F (36.7 °C) (Temporal)   Resp 18   Ht 5' 9\" (1.753 m)   Wt 87 lb (39.5 kg)   SpO2 100%   BMI 12.85 kg/m²        Acute on chronic blood loss anemia  Plan:   Will transfuse 1 unit of packed red blood cells   Consulted colorectal surgery  Continue pain control.  Patient not a candidate for surgical treatment     Advanced anal cancer.  Patient seen seen by colorectal surgery as well as by the oncology.  Patient is not a candidate for any systemic therapy or any surgery at this time.     Hospice is recommended.  Patient has not decided about hospice care.  Patient tells me that he is considering various aspects for going on hospice including insurance coverage and the payment to his caregivers           Discussed with palliative care physician also.  Prognosis is poor.        I had a lengthy discussion with the patient and advised him that RBC transfusion to 13 to 14 g is not recommended.  I recommended that can do blood test if the hemoglobin less than 7 we can transfuse him 1 unit of packed red blood cells     The patient is refusing blood draw.        6/4 Palliative care      Squamous cell cancer, anus (HCC)    Rectal bleeding    Cancer associated pain    Hyponatremia     Symptom Management                  Pain:  -will start morphine liquid soln 10mg q6hr scheduled  -prn IV morphine 4mg will space out to q3  -will transition to oral pain meds  asap     Nausea/Cachexia:  -complains of chronic dysphagia  -wants to bring in his own protein/tube feed shakes 'katefarms'     Edema  -complains of large pitting edema in his bilateral LE  -recommend wean or saline lock IVFs                 Prevention of Constipation:                - struggles with difficult bms due to tumor               - lactulose 10g BID          MEDICATIONS ADMINISTERED IN LAST 1 DAY:  morphINE PF 2 MG/ML injection 2 mg       Date Action Dose Route User    6/5/2025 1134 Given 2 mg Intravenous Pippa Lnae RN          morphINE PF 4 MG/ML injection 4 mg       Date Action Dose Route User    6/5/2025 0059 Given 4 mg Intravenous Cristiano Hillman RN    6/4/2025 2029 Given 4 mg Intravenous Cristiano Hillman RN    6/4/2025 1807 Given 4 mg Intravenous Pippa Lane RN          morphINE 10 MG/5ML oral solution 10 mg       Date Action Dose Route User    6/4/2025 2339 Given 10 mg Oral Cristiano Hillman RN            Vitals (last day)       Date/Time Temp Pulse Resp BP SpO2 Weight O2 Device O2 Flow Rate (L/min) Who    06/05/25 1213 98.3 °F (36.8 °C) 94 14 93/51 100 % -- None (Room air) --     06/04/25 2032 98.9 °F (37.2 °C) 90 14 97/59 99 % -- None (Room air) -- MD    06/04/25 1230 98 °F (36.7 °C) 106 18 85/51 99 % -- None (Room air) -- KA    06/04/25 0523 98 °F (36.7 °C) 97 18 94/65 100 % -- None (Room air) -- MD          CIWA Scores (since admission)       None          Blood Transfusion Record       Product Unit Status Volume Start End            Transfuse RBC       25  [id number]  [id number] Completed 06/03/25 1516 301.67 mL 06/03/25 1213 06/03/25 1514       25 [id number]  [id number] Completed 06/02/25 1502 370.83 mL 06/02/25 1159 06/02/25 1500

## 2025-06-06 NOTE — CM/SW NOTE
RODRIGO received for IP Hospice consult.    CM called [facility] liaison Alondra - confirmed INES Moreno will be signing consents with [facility] at 11:30 AM today.  Per Alondra, hospice equipment should be delivered today once consents are signed.  Alondra requesting follow up early this afternoon to confirm.    CM notified Dr. Jauregui of above.  CM will update Dr. Ontiveros once [facility] consents have been signed to coordinate discharge.    1459 CM received call from [facility] liaison Alondra - hospice consents have been signed.  [facility] will deliver equipment to patient's home by tomorrow at 4 PM.    CM placed [facility] on will call through Sunday, 6/8.    CM notified Dr. Ontiveros of above via [employer] at 1458.    CM notified Dr. Jauregui and SOLO Fernandez of above.    [place] [address]  [place] [address]  Phone: [phone]  Fax: [phone]    / to remain available for support and/or discharge planning.     Plan: Home with [facility] - pending delivery of equipment 6/7    Katherine Hinson RN, BSN  Nurse   [phone]

## 2025-06-06 NOTE — PLAN OF CARE
Patient DNAR/comfort. Alert & oriented x4. On room air. PRN morphine for pain. PRN zofran given x1. Incontinent, changed multiple times. Repositioning as needed. Comfort measures maintained. Will continue to monitor       Problem: Patient Centered Care  Goal: Patient preferences are identified and integrated in the patient's plan of care  Description: Interventions:  - What would you like us to know as we care for you?   - Provide timely, complete, and accurate information to patient/family  - Incorporate patient and family knowledge, values, beliefs, and cultural backgrounds into the planning and delivery of care  - Encourage patient/family to participate in care and decision-making at the level they choose  - Honor patient and family perspectives and choices  Outcome: Progressing     Problem: Patient/Family Goals  Goal: Patient/Family Long Term Goal  Description: Patient's Long Term Goal: to feel better    Interventions:  - Monitor vital signs and labs  - Administer medications per order  - Follow MD orders  - Fall precautions  - Diagnostics as ordered  - Update / inform patient on plan of care  - Discharge planning  - See additional Care Plan goals for specific interventions  Outcome: Progressing  Goal: Patient/Family Short Term Goal  Description: Patient's Short Term Goal: to go home    Interventions:   - Monitor vital signs and labs  - Administer medications per order  - Follow MD orders  - Fall precautions  - Diagnostics as ordered  - Update / inform patient on plan of care  - Discharge planning  - See additional Care Plan goals for specific interventions  Outcome: Progressing     Problem: GASTROINTESTINAL - ADULT  Goal: Maintains or returns to baseline bowel function  Description: INTERVENTIONS:  - Assess bowel function  - Maintain adequate hydration with IV or PO as ordered and tolerated  - Evaluate effectiveness of GI medications  - Encourage mobilization and activity  - Obtain nutritional consult as  needed  - Establish a toileting routine/schedule  - Consider collaborating with pharmacy to review patient's medication profile  Outcome: Progressing  Goal: Maintains adequate nutritional intake (undernourished)  Description: INTERVENTIONS:  - Monitor percentage of each meal consumed  - Identify factors contributing to decreased intake, treat as appropriate  - Assist with meals as needed  - Monitor I&O, WT and lab values  - Obtain nutritional consult as needed  - Optimize oral hygiene and moisture  - Encourage food from home; allow for food preferences  - Enhance eating environment  Outcome: Progressing     Problem: Impaired Functional Mobility  Goal: Achieve highest/safest level of mobility/gait  Description: Interventions:  - Assess patient's functional ability and stability  - Promote increasing activity/tolerance for mobility and gait  - Educate and engage patient/family in tolerated activity level and precautions  Outcome: Progressing     Problem: PAIN - ADULT  Goal: Verbalizes/displays adequate comfort level or patient's stated pain goal  Description: INTERVENTIONS:  - Encourage pt to monitor pain and request assistance  - Assess pain using appropriate pain scale  - Administer analgesics based on type and severity of pain and evaluate response  - Implement non-pharmacological measures as appropriate and evaluate response  - Consider cultural and social influences on pain and pain management  - Manage/alleviate anxiety  - Utilize distraction and/or relaxation techniques  - Monitor for opioid side effects  - Notify MD/LIP if interventions unsuccessful or patient reports new pain  - Anticipate increased pain with activity and pre-medicate as appropriate  Outcome: Progressing

## 2025-06-06 NOTE — PROGRESS NOTES
[facility]  part of [facility]    Progress Note    Joseph Angel Patient Status:  Inpatient    1980 MRN [id number]   Location [facility] 4W/SW/SE Attending Lane Ontiveros MD   Hosp Day # 6 PCP None Pcp       SUBJECTIVE:  Feeling okay.   reports patient has been accepted to hospice and patient is willing to go on hospice care.  The plan is for the equipment to be delivered tomorrow and to be discharged tomorrow.      OBJECTIVE:  Vital signs in last 24 hours:  BP 95/57 (BP Location: Right arm)   Pulse 91   Temp 97.9 °F (36.6 °C) (Oral)   Resp 16   Ht 5' 9\" (1.753 m)   Wt 87 lb (39.5 kg)   SpO2 98%   BMI 12.85 kg/m²     Intake/Output:    Intake/Output Summary (Last 24 hours) at 2025 1252  Last data filed at 2025 1216  Gross per 24 hour   Intake 600 ml   Output 300 ml   Net 300 ml       Wt Readings from Last 3 Encounters:   25 87 lb (39.5 kg)   25 88 lb 2.9 oz (40 kg)   03/10/25 90 lb (40.8 kg)       Exam  Gen: No acute distress, alert and oriented x3,  HEENT: Pallor is noted  Pulm: Lungs clear, normal respiratory effort  CV: Heart with regular rate and rhythm, no peripheral edema  Abd: Abdomen soft, nontender, nondistended, no organomegaly, bowel sounds present  Skin: no rashes or lesions  CNS: Alert  Extremities pedal edema noted    Data Review:     Labs:            LABS  Recent Labs   Lab 25  0643 25  1555 25  0631 25  1538 25  0839 25  0840   RBC 2.51*  --  2.71*  --   --  3.59*   HGB 6.0*   < > 6.9* 8.3*  --  8.9*   HCT 20.8*   < > 22.4* 27.0*  --  29.4*   MCV 82.9  --  82.7  --   --  81.9   MCH 23.9*  --  25.5*  --   --  24.8*   MCHC 28.8*  --  30.8*  --   --  30.3*   RDW 17.5*  --  17.2*  --   --  17.7*   NEPRELIM 22.66*  --  22.99*  --   --  18.32*   WBC 25.9*  --  26.2*  --   --  20.6*   .0  --  255.0  --   --  261.0   AST  --   --  13  --   --   --    ALT  --   --  7*  --   --   --    GLU  94  --  100*  --  148*  --     < > = values in this interval not displayed.       Imaging:      Meds:   Current Hospital Medications[1]    Assessment  Problem List[2]  Acute on chronic blood loss anemia      Advanced anal cancer  Consulted colorectal surgery  Continue pain control.  Patient not a candidate for surgical treatment    Advanced anal cancer.  Patient seen seen by colorectal surgery as well as by the oncology.  Patient is not a candidate for any systemic therapy or any surgery at this time.    Hospice is recommended.  Patient has not decided about hospice care.  Patient tells me that he is considering various aspects for going on hospice including insurance coverage and the payment to his caregivers        Discussed with palliative care physician also.  Prognosis is poor.    discussed with the nursing staff.    Discharge planning to hospice care tomorrow         Active Orders   Nourishments    Dietary Nutrition Supplements BID     Frequency: BID     Number of Occurrences: Until Specified     Order Comments: AM: vanilla flavor  PM: zeus flavor     Diet    Regular/General diet Texture Consistency: Regular; Is Patient on Accuchecks? No     Frequency: Effective Now     Number of Occurrences: Until Specified   Nursing    Hemoglobin post transfusion 45 min after transfusion is completed (RN to enter order)     Frequency: Once     Number of Occurrences: 1 Occurrences    Hemoglobin post transfusion 45 min after transfusion is completed (RN to enter order)     Frequency: Once     Number of Occurrences: 1 Occurrences    Initiate transfusion reaction protocol: if patient exhibits signs/symptoms of transfusion reaction     Frequency: PRN     Number of Occurrences: Until Specified    Initiate transfusion reaction protocol: if patient exhibits signs/symptoms of transfusion reaction     Frequency: PRN     Number of Occurrences: Until Specified    Insert/maintain PIV     Frequency: PRN     Number of Occurrences: Until  Specified    Insert/maintain PIV     Frequency: PRN     Number of Occurrences: Until Specified    Notify physician     Frequency: Continuous     Number of Occurrences: Until Specified     Order Comments: Hold transfusion and notify physician if the patient:   1) Complains of chills an/or abdominal/back pain  2) Shortness of breath  3) Chest pain   4) Restlessness   5) Infusion site pain   6) Sudden changes in vital signs ie: decrease in blood pressure or temperature: elevation of one degree Centigrade or 2 degrees fahrenheit from pre-transfusion temperature  7) Nausea/vomiting   8) Shock   9) Change in color of urine   10) Urticaria   11) Edema        Notify physician     Frequency: Continuous     Number of Occurrences: Until Specified     Order Comments: Hold transfusion and notify physician if the patient:   1) Complains of chills an/or abdominal/back pain  2) Shortness of breath  3) Chest pain   4) Restlessness   5) Infusion site pain   6) Sudden changes in vital signs ie: decrease in blood pressure or temperature: elevation of one degree Centigrade or 2 degrees fahrenheit from pre-transfusion temperature  7) Nausea/vomiting   8) Shock   9) Change in color of urine   10) Urticaria   11) Edema        Provide patient/family transfusion information     Frequency: Once     Number of Occurrences: 1 Occurrences    Provide patient/family transfusion information     Frequency: Once     Number of Occurrences: 1 Occurrences    Vital signs - Per blood administration policy     Frequency: Per Unit Routine     Number of Occurrences: Until Specified     Order Comments: Vital signs as follows; T, BP, HR, RR one set prior to transfusion, one set at 15 minutes, then one set hourly until transfusion complete. After transufusion complete resume previous VS frequency.        Vital signs - Per blood administration policy     Frequency: Per Unit Routine     Number of Occurrences: Until Specified     Order Comments: Vital signs as  follows; T, BP, HR, RR one set prior to transfusion, one set at 15 minutes, then one set hourly until transfusion complete. After transufusion complete resume previous VS frequency.       Code Status    DNAR/Comfort care Continuous     Frequency: Continuous     Number of Occurrences: Until Specified     Order Comments: Primary goal of maximizing comfort. Relieve pain & suffering through the use of medication by any route as needed; use oxygen, suctioning & manual treatment of airway obstruction.       Consult    Consult to General Surgery     Frequency: Once     Number of Occurrences: 1 Occurrences    Consult to General Surgery     Frequency: Once     Number of Occurrences: 1 Occurrences   Isolation    Enteric/Contact PLUS Isolation Continuous     Frequency: Continuous     Number of Occurrences: Until Specified   Medications    acetaminophen (Ofirmev) 10 mg/mL infusion premix 600 mg     Frequency: Q8H PRN     Dose: 15 mg/kg     Route: Intravenous    acetaminophen (Tylenol Extra Strength) tab 1,000 mg     Frequency: Q6H PRN     Dose: 1,000 mg     Route: Oral    hydrOXYzine HCl (ATARAX) 10 MG/5ML syrup 10 mg     Frequency: Q6H PRN     Dose: 10 mg     Route: Oral    lactulose (CHRONULAC) 10 GM/15ML solution 10 g     Frequency: BID     Dose: 10 g     Route: Oral    LORazepam (Ativan) 2 mg/mL injection 1 mg     Frequency: Q6H PRN     Dose: 1 mg     Route: Intravenous    morphINE PF 4 MG/ML injection 4 mg     Frequency: Q2H PRN     Dose: 4 mg     Route: Intravenous    ondansetron (Zofran-ODT) disintegrating tab 8 mg     Frequency: Q6H PRN     Dose: 8 mg     Route: Oral    thiamine (Vitamin B1) tab 100 mg     Frequency: Daily     Dose: 100 mg     Route: Oral       Lane Ontiveros MD  6/1/2025           [1]   Current Facility-Administered Medications   Medication Dose Route Frequency    ondansetron (Zofran-ODT) disintegrating tab 8 mg  8 mg Oral Q6H PRN    hydrOXYzine HCl (ATARAX) 10 MG/5ML syrup 10 mg  10 mg Oral Q6H PRN     acetaminophen (Ofirmev) 10 mg/mL infusion premix 600 mg  15 mg/kg Intravenous Q8H PRN    lactulose (CHRONULAC) 10 GM/15ML solution 10 g  10 g Oral BID    thiamine (Vitamin B1) tab 100 mg  100 mg Oral Daily    LORazepam (Ativan) 2 mg/mL injection 1 mg  1 mg Intravenous Q6H PRN    morphINE PF 4 MG/ML injection 4 mg  4 mg Intravenous Q2H PRN    acetaminophen (Tylenol Extra Strength) tab 1,000 mg  1,000 mg Oral Q6H PRN   [2]   Patient Active Problem List  Diagnosis    Squamous cell cancer, anus (HCC)    Iron deficiency anemia due to chronic blood loss    Rectal bleeding    Cancer associated pain    Hyponatremia    Palliative care by specialist

## 2025-06-06 NOTE — PAYOR COMM NOTE
--------------  CONTINUED STAY REVIEW FOR 6/5   Payor: [facility]  Subscriber #:  [id number]  Authorization Number: [id number]    Admit date: 5/31/25  Admit time: 10:59 PM    6/5             Date of Service: 6/5/2025  5:40 PM     Signed            [facility]  part of [facility]     Progress Note             SUBJECTIVE:  Feeling okay.  Patient is considering hospice care.  But tells me that it is not set in cement  Patient's power of  Ms. Moreno is in the room also           OBJECTIVE:  Vital signs in last 24 hours:  BP 93/51 (BP Location: Right arm)   Pulse 94   Temp 98.3 °F (36.8 °C) (Oral)   Resp 14   Ht 5' 9\" (1.753 m)   Wt 87 lb (39.5 kg)   SpO2 100%   BMI 12.85 kg/m²      Intake/Output:     Intake/Output Summary (Last 24 hours) at 6/5/2025 1741  Last data filed at 6/5/2025 1100      Gross per 24 hour   Intake --   Output 550 ml   Net -550 ml             Wt Readings from Last 3 Encounters:   05/31/25 87 lb (39.5 kg)   05/26/25 88 lb 2.9 oz (40 kg)   03/10/25 90 lb (40.8 kg)         Exam  Gen: No acute distress, alert and oriented x3,  HEENT: Pallor is noted  Pulm: Lungs clear, normal respiratory effort  CV: Heart with regular rate and rhythm, no peripheral edema  Abd: Abdomen soft, nontender, nondistended, no organomegaly, bowel sounds present  Skin: no rashes or lesions  CNS: Alert     Data Review:      Labs:         Lab Results   Component Value Date     WBC 20.6 06/05/2025     HGB 8.9 06/05/2025     HCT 29.4 06/05/2025     .0 06/05/2025     CREATSERUM 0.63 06/05/2025     BUN 9 06/05/2025      06/05/2025     K 3.5 06/05/2025      06/05/2025     CO2 21.0 06/05/2025      06/05/2025     CA 9.2 06/05/2025            LABS           Recent Labs   Lab 06/02/25  0643 06/02/25  1555 06/03/25  0631 06/03/25  1538 06/05/25  0839 06/05/25  0840   RBC 2.51*  --  2.71*  --   --  3.59*   HGB 6.0*   < > 6.9* 8.3*  --  8.9*   HCT 20.8*    < > 22.4* 27.0*  --  29.4*   MCV 82.9  --  82.7  --   --  81.9   MCH 23.9*  --  25.5*  --   --  24.8*   MCHC 28.8*  --  30.8*  --   --  30.3*   RDW 17.5*  --  17.2*  --   --  17.7*   NEPRELIM 22.66*  --  22.99*  --   --  18.32*   WBC 25.9*  --  26.2*  --   --  20.6*   .0  --  255.0  --   --  261.0   AST  --   --  13  --   --   --    ALT  --   --  7*  --   --   --    GLU 94  --  100*  --  148*  --     < > = values in this interval not displayed.         Imaging:        Meds:   [Current Hospital Medications]    [Current Hospital Medications]         Current Facility-Administered Medications   Medication Dose Route Frequency    ondansetron (Zofran-ODT) disintegrating tab 8 mg  8 mg Oral Q6H PRN    hydrOXYzine HCl (ATARAX) 10 MG/5ML syrup 10 mg  10 mg Oral Q6H PRN    acetaminophen (Ofirmev) 10 mg/mL infusion premix 600 mg  15 mg/kg Intravenous Q8H PRN    lactulose (CHRONULAC) 10 GM/15ML solution 10 g  10 g Oral BID    thiamine (Vitamin B1) tab 100 mg  100 mg Oral Daily    LORazepam (Ativan) 2 mg/mL injection 1 mg  1 mg Intravenous Q6H PRN    morphINE PF 4 MG/ML injection 4 mg  4 mg Intravenous Q2H PRN    acetaminophen (Tylenol Extra Strength) tab 1,000 mg  1,000 mg Oral Q6H PRN        Assessment  [Problem List]    [Problem List]  Patient Active Problem List      Diagnosis    Squamous cell cancer, anus (HCC)    Iron deficiency anemia due to chronic blood loss    Rectal bleeding    Cancer associated pain    Hyponatremia    Palliative care by specialist     Acute on chronic blood loss anemia  Plan:   Will transfuse 1 unit of packed red blood cells      Advanced anal cancer  Consulted colorectal surgery  Continue pain control.  Patient not a candidate for surgical treatment     Advanced anal cancer.  Patient seen seen by colorectal surgery as well as by the oncology.  Patient is not a candidate for any systemic therapy or any surgery at this time.     Hospice is recommended.  Patient has not decided about hospice  care.  Patient tells me that he is considering various aspects for going on hospice including insurance coverage and the payment to his caregivers           Discussed with palliative care physician also.  Prognosis is poor.     I discussed with the nursing staff.  Discussed with the patient power of      My nurse called me and informed me that patient does not want to sign the DNR form but he wants to be DNR comfort care.  And according to the palliative care physician patient wants to be DNR.  And the palliative care physician wants to keep him DNR comfort care only.  Patient will also be evaluated by hospice.               Lane Ontiveros MD  6/1/2025                               MEDICATIONS ADMINISTERED IN LAST 1 DAY:  morphINE PF 4 MG/ML injection 4 mg       Date Action Dose Route User    6/6/2025 1544 Given 4 mg Intravenous Rebecca Doherty RN    6/6/2025 0902 Given 4 mg Intravenous Rebecca Doherty RN    6/6/2025 0622 Given 4 mg Intravenous Karson Vance RN    6/6/2025 0321 Given 4 mg Intravenous Karson Vance RN    6/5/2025 2029 Given 4 mg Intravenous Karson Vance RN    6/5/2025 1637 Given 4 mg Intravenous Pippa Lane RN          ondansetron (Zofran-ODT) disintegrating tab 8 mg       Date Action Dose Route User    6/6/2025 0254 Given 8 mg Oral Karson Vance RN            Vitals (last day)       Date/Time Temp Pulse Resp BP SpO2 Weight O2 Device O2 Flow Rate (L/min) Who    06/06/25 1328 98.1 °F (36.7 °C) 91 16 92/62 100 % -- None (Room air) -- KB    06/06/25 0333 97.9 °F (36.6 °C) 91 16 95/57 98 % -- None (Room air) -- DA    06/05/25 1939 97.8 °F (36.6 °C) 91 16 98/65 93 % -- None (Room air) -- DA    06/05/25 1213 98.3 °F (36.8 °C) 94 14 93/51 100 % -- None (Room air) -- RH          CIWA Scores (since admission)       None          Blood Transfusion Record       Product Unit Status Volume Start End            Transfuse RBC       25  [id number]  [id number] Completed 06/03/25 9897  301.67 mL 06/03/25 1213 06/03/25 1514       25 [id number]  [id number] Completed 06/02/25 1502 370.83 mL 06/02/25 1159 06/02/25 1500

## 2025-06-06 NOTE — PROGRESS NOTES
Palliative Care Progress Note    Joseph Angel Patient Status:  Inpatient    1980 MRN [id number]   Location [facility] 4W/SW/SE Attending Lane Ontiveros MD   Hosp Day # 6 PCP None Pcp     Date of Consult: 2025  Patient seen at: [facility] Inpatient    Reason for Consultation: Consult requested for goals of care and care coordination.    Subjective     S: Said he did not tolerate oral morphine (stuck in throat). Asking for just prn IV. Incontinent.  Pain with trying to move his legs.     Review of Systems: Palliative Care symptom needs assessed:     Denies dypsnea.   Denies cough or lightheadedness/dizziness.   See below for current pain issues.   Minimal appetite  Denies n/v   See below for constipation/diarrhea issues.   No depression or anxiety.      Palliative Care Social History:   Marital Status: Single  Children: No  Living Situation Prior to Admit: Home  Occupational History: Working  Personal:     Spiritual  Joseph Angel [employer]    requested: No    Medical History: obtained from [employer]  Past Medical History[1]  Past Surgical History[2]    Family History: obtained from [employer]  Family History[3]    Allergies:  Allergies[4]    Medications:   Current Hospital Medications[5]  Prior to Admission Medications[6]      Palliative Performance Scale: 30 %  % Ambulation Activity Level Self-Care Intake Consciousness   100 Full  Normal  No Disease Full Normal Full   90 Full  Normal  Some Disease Full Normal Full   80 Full  Normal w/effort  Some Disease Full Normal or reduced Full   70 Reduced  Can't Perform Job  Some Disease Full Normal or reduced Full   60 Reduced  Can't Perform Hobby   Significant Disease Occ Assist Normal or reduced Full or confused   50 Mainly sit/lie Can't do any work  Extensive Disease Partial Assist Normal or reduced Full or confused   40 Mainly in bed Can't do any work  Extensive Disease Mainly Assist Normal or reduced Full or confused   30 Bed Bound Can't do any  work  Extensive Disease Max Assist  Total Care Reduced  Drowsy/confused   20 Bed Bound Can't do any work  Extensive Disease Max Assist  Total Care Minimal  Drowsy/confused   10 Bed Bound Can't do any work  Extensive Disease Max Assist  Total Care Mouth Care  Drowsy/confused   0 Death        Objective      Vital Signs:  Blood pressure 92/62, pulse 91, temperature 98.1 °F (36.7 °C), temperature source Oral, resp. rate 16, height 5' 9\" (1.753 m), weight 87 lb (39.5 kg), SpO2 100%.  Body mass index is 12.85 kg/m².  Non-verbal signs of pain present: No    Physical Exam:  General: Alert, awake and in no  apparent respiratory distress. Emaciated.  HEENT: MMM. No obvious focal deficits.   Cardiac: RRR  Lungs: Normal effort on RA  Abdomen: Soft, non-distended  Extremities:  Large pitting Edema present  Skin: Malodorous anal lesion    Hematology:  Lab Results   Component Value Date    WBC 20.6 (H) 06/05/2025    HGB 8.9 (L) 06/05/2025    HCT 29.4 (L) 06/05/2025    .0 06/05/2025       Coags:  Lab Results   Component Value Date    INR 1.05 03/10/2025    PTT 28.8 10/28/2024       Chemistry:  Lab Results   Component Value Date    CREATSERUM 0.63 (L) 06/05/2025    BUN 9 06/05/2025     (L) 06/05/2025    K 3.5 06/05/2025     06/05/2025    CO2 21.0 06/05/2025     (H) 06/05/2025    CA 9.2 06/05/2025    ALB 2.2 (L) 06/03/2025    ALKPHO 102 06/03/2025    BILT 0.5 06/03/2025    TP 4.7 (L) 06/03/2025    AST 13 06/03/2025    ALT 7 (L) 06/03/2025       Imaging:  No results found.    Assessment and Recommendations        Squamous cell cancer, anus (HCC)    Rectal bleeding    Cancer associated pain    Hyponatremia    Symptom Management      Pain:  -prn IV morphine 4mg   -I offered fentanyl patch, he does not want that  -may need cadd pump for morphine    Nausea/Cachexia:  -complains of chronic dysphagia  -wants to bring in his own protein/tube feed shakes 'katefarms'  -zofran odt 8mg prn    Edema  -complains of large  pitting edema in his bilateral LE  -recommend wean or saline lock IVFs  -trial of ace wraps or compression socks     Prevention of Constipation:    - struggles with difficult bms due to tumor   - make laxative PRN    2.     Advanced Care Planning  A voluntarily discussion and explanation regarding Advance Care Planning (ACP) took place today with patient. We discussed the risks vs benefits of life sustaining treatments in the setting of Joseph Angel's comorbid medical conditions. Items addressed: patient preferences , code status , change in health status , end-of-life care plan, and general prognosis . This resulted in a better understanding of ACP documentation , a better understanding of pt's expressed wishes/preferences , and confirmation of code status .   Summary:     Total time dedicated to ACP >46 minutes .       3.     Serious Illness Conversation:   Code Status: DNAR  Patient is essentially estranged from his family  He defers to his friends listed as emergency surrogate decision-makers but he fails to see what they might be needed for, as he states his preference for DNR and that is all they need to know.   He states that he only wants to know about 2 medical treatments and that's it:  If a colorectal surgeon could do a local palliative debulking/resection  If he can get blood transfusions because 'normal is around 10mg/dl. And I haven't had an erection in months, you know, like normal men's health stuff.'  I asked about caregiving if he became weaker: he said he would want his friends to take him to an airbnb.   I will follow up on Dr. Conn's conversation. I advised him that a resection would not likely be offered as it would most likely not heal or require margins to accomplish. Not to mention his overall frailty.   I think he will need pain control and outpatient/intermediate care planning.  He WAS interested in inpatient hospice so I will alert [facility].   He is more comfortable today. He understands  that he cannot have IV morphine at home. He is amenable to trial of liquid morphine today. He is open to discussing home hospice. I reviewed that resection is not possible.  Spoke with Tiffanie More. She is Joseph's legal POA. She is a [place] . I updated her on the clinical picture and advised hospice care at home. She agrees and will send referral for Renny to contact her. She says that between herself and some other friends, they had been arranging caregiving for him. 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   I will follow along.   [place] to contact Tiffanie to discuss discharge options.  Discharge planning with Renny on going. I think consents have been signed.    Palliative Care Follow Up: Palliative care team will Continue to follow while inpatient    A total of 60 minutes were spent on this visit, which included all of the following: direct face to face contact, history taking, physical examination, and >50% was spent counseling and coordinating care.    Cristiano Jauregui MD  Palliative Care Services  [facility], [place] [phone]    Note to patient:  The 21st Century Cures Act makes medical notes like these available to patients in the interest of transparency. However, be advised this is a medical document. It is intended as peer to peer communication. It is written in medical language and may contain abbreviations or verbiage that are unfamiliar. It may appear blunt or direct. Medical documents are intended to carry relevant information, facts as evident, and the clinical opinion of the practitioner.           [1]   Past Medical History:   Anal cancer (HCC)   [2] History reviewed. No pertinent surgical history.  [3]   Family History  Problem Relation Age of Onset    Other (Hx of breast cancer) Mother    [4]   Allergies  Allergen Reactions    Shellfish Allergy HIVES    Shrimp RASH   [5]   Current Facility-Administered  Medications:     lactulose (CHRONULAC) 10 GM/15ML solution 10 g, 10 g, Oral, TID PRN    ondansetron (Zofran-ODT) disintegrating tab 8 mg, 8 mg, Oral, Q6H PRN    hydrOXYzine HCl (ATARAX) 10 MG/5ML syrup 10 mg, 10 mg, Oral, Q6H PRN    acetaminophen (Ofirmev) 10 mg/mL infusion premix 600 mg, 15 mg/kg, Intravenous, Q8H PRN    LORazepam (Ativan) 2 mg/mL injection 1 mg, 1 mg, Intravenous, Q6H PRN    morphINE PF 4 MG/ML injection 4 mg, 4 mg, Intravenous, Q2H PRN    acetaminophen (Tylenol Extra Strength) tab 1,000 mg, 1,000 mg, Oral, Q6H PRN  [6]   No current outpatient medications on file.

## 2025-06-06 NOTE — PLAN OF CARE
Patient alert, oriented, complete care, medicated for pain as ordered, family/friends at bedside for moral support, plan to discharge home with hospice tomorrow.  Problem: Patient Centered Care  Goal: Patient preferences are identified and integrated in the patient's plan of care  Description: Interventions:  - What would you like us to know as we care for you?   - Provide timely, complete, and accurate information to patient/family  - Incorporate patient and family knowledge, values, beliefs, and cultural backgrounds into the planning and delivery of care  - Encourage patient/family to participate in care and decision-making at the level they choose  - Honor patient and family perspectives and choices  Outcome: Progressing     Problem: Patient/Family Goals  Goal: Patient/Family Long Term Goal  Description: Patient's Long Term Goal: to feel better    Interventions:  - Monitor vital signs and labs  - Administer medications per order  - Follow MD orders  - Fall precautions  - Diagnostics as ordered  - Update / inform patient on plan of care  - Discharge planning  - See additional Care Plan goals for specific interventions  Outcome: Progressing  Goal: Patient/Family Short Term Goal  Description: Patient's Short Term Goal: to go home    Interventions:   - Monitor vital signs and labs  - Administer medications per order  - Follow MD orders  - Fall precautions  - Diagnostics as ordered  - Update / inform patient on plan of care  - Discharge planning  - See additional Care Plan goals for specific interventions  Outcome: Progressing     Problem: PAIN - ADULT  Goal: Verbalizes/displays adequate comfort level or patient's stated pain goal  Description: INTERVENTIONS:  - Encourage pt to monitor pain and request assistance  - Assess pain using appropriate pain scale  - Administer analgesics based on type and severity of pain and evaluate response  - Implement non-pharmacological measures as appropriate and evaluate response  -  Consider cultural and social influences on pain and pain management  - Manage/alleviate anxiety  - Utilize distraction and/or relaxation techniques  - Monitor for opioid side effects  - Notify MD/LIP if interventions unsuccessful or patient reports new pain  - Anticipate increased pain with activity and pre-medicate as appropriate  Outcome: Progressing

## 2025-06-07 NOTE — PROGRESS NOTES
Palliative Care Progress Note    Joseph Angel Patient Status:  Inpatient    1980 MRN [id number]   Location [facility] 4W/SW/SE Attending Lane Ontiveros MD   Hosp Day # 7 PCP None Pcp     Date of Consult: 2025  Patient seen at: [facility] In[facility]    Reason for Consultation: Consult requested for goals of care and care coordination.    Subjective     S: Lethargic. In and out of consciousness.     Review of Systems: Palliative Care symptom needs assessed:     Denies dypsnea.   Denies cough or lightheadedness/dizziness.   See below for current pain issues.   Minimal appetite  Denies n/v   See below for constipation/diarrhea issues.   No depression or anxiety.      Palliative Care Social History:   Marital Status: Single  Children: No  Living Situation Prior to Admit: Home  Occupational History: Working  Personal:     Spiritual  Joseph Angel [employer]    requested: No    Medical History: obtained from [facility]  Past Medical History[1]  Past Surgical History[2]    Family History: obtained from [facility]  Family History[3]    Allergies:  Allergies[4]    Medications:   Current Hospital Medications[5]  Prior to Admission Medications[6]      Palliative Performance Scale: 30 %  % Ambulation Activity Level Self-Care Intake Consciousness   100 Full  Normal  No Disease Full Normal Full   90 Full  Normal  Some Disease Full Normal Full   80 Full  Normal w/effort  Some Disease Full Normal or reduced Full   70 Reduced  Can't Perform Job  Some Disease Full Normal or reduced Full   60 Reduced  Can't Perform Hobby   Significant Disease Occ Assist Normal or reduced Full or confused   50 Mainly sit/lie Can't do any work  Extensive Disease Partial Assist Normal or reduced Full or confused   40 Mainly in bed Can't do any work  Extensive Disease Mainly Assist Normal or reduced Full or confused   30 Bed Bound Can't do any work  Extensive Disease Max Assist  Total Care Reduced  Drowsy/confused   20 Bed Bound Can't do  any work  Extensive Disease Max Assist  Total Care Minimal  Drowsy/confused   10 Bed Bound Can't do any work  Extensive Disease Max Assist  Total Care Mouth Care  Drowsy/confused   0 Death        Objective      Vital Signs:  Blood pressure (!) 89/57, pulse 80, temperature 97.7 °F (36.5 °C), temperature source Oral, resp. rate 14, height 5' 9\" (1.753 m), weight 87 lb (39.5 kg), SpO2 100%.  Body mass index is 12.85 kg/m².  Non-verbal signs of pain present: No    Physical Exam:  General: Alert, awake and in no  apparent respiratory distress. Emaciated.  HEENT: MMM. No obvious focal deficits.   Cardiac: RRR  Lungs: Normal effort on RA  Abdomen: Soft, non-distended  Extremities:  Large pitting Edema present  Skin: Malodorous anal lesion    Hematology:  Lab Results   Component Value Date    WBC 20.6 (H) 06/05/2025    HGB 8.9 (L) 06/05/2025    HCT 29.4 (L) 06/05/2025    .0 06/05/2025       Coags:  Lab Results   Component Value Date    INR 1.05 03/10/2025    PTT 28.8 10/28/2024       Chemistry:  Lab Results   Component Value Date    CREATSERUM 0.63 (L) 06/05/2025    BUN 9 06/05/2025     (L) 06/05/2025    K 3.5 06/05/2025     06/05/2025    CO2 21.0 06/05/2025     (H) 06/05/2025    CA 9.2 06/05/2025    ALB 2.2 (L) 06/03/2025    ALKPHO 102 06/03/2025    BILT 0.5 06/03/2025    TP 4.7 (L) 06/03/2025    AST 13 06/03/2025    ALT 7 (L) 06/03/2025       Imaging:  No results found.    Assessment and Recommendations        Squamous cell cancer, anus (HCC)    Rectal bleeding    Cancer associated pain    Hyponatremia    Symptom Management      Pain:  -prn IV morphine 4mg   -start hydromorphone infusion 0.2mg/hr    Nausea/Cachexia:  -complains of chronic dysphagia  -wants to bring in his own protein/tube feed shakes 'katefarms'  -zofran odt 8mg prn    Edema  -complains of large pitting edema in his bilateral LE  -recommend wean or saline lock IVFs  -trial of ace wraps or compression socks     Prevention of  Constipation:    - struggles with difficult bms due to tumor   - make laxative PRN    2.     Advanced Care Planning  A voluntarily discussion and explanation regarding Advance Care Planning (ACP) took place today with patient. We discussed the risks vs benefits of life sustaining treatments in the setting of Joseph Angel's comorbid medical conditions. Items addressed: patient preferences , code status , change in health status , end-of-life care plan, and general prognosis . This resulted in a better understanding of ACP documentation , a better understanding of pt's expressed wishes/preferences , and confirmation of code status .   Summary:     Total time dedicated to ACP >46 minutes .       3.     Serious Illness Conversation:   Code Status: DNAR  Patient is essentially estranged from his family  He defers to his friends listed as emergency surrogate decision-makers but he fails to see what they might be needed for, as he states his preference for DNR and that is all they need to know.   He states that he only wants to know about 2 medical treatments and that's it:  If a colorectal surgeon could do a local palliative debulking/resection  If he can get blood transfusions because 'normal is around 10mg/dl. And I haven't had an erection in months, you know, like normal men's health stuff.'  I asked about caregiving if he became weaker: he said he would want his friends to take him to an airbnb.   I will follow up on Dr. Conn's conversation. I advised him that a resection would not likely be offered as it would most likely not heal or require margins to accomplish. Not to mention his overall frailty.   I think he will need pain control and outpatient/shelter care planning.  He WAS interested in inpatient hospice so I will alert [facility].   He is more comfortable today. He understands that he cannot have IV morphine at home. He is amenable to trial of liquid morphine today. He is open to discussing home hospice. I  reviewed that resection is not possible.  Spoke with Tiffanie Argenis. She is Joseph's legal POA. She is a [employer] . I updated her on the clinical picture and advised hospice care at home. She agrees and will send referral for Renny to contact her. She says that between herself and some other friends, they had been arranging caregiving for him. 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   I will follow along.   Renny to contact Tiffanie to discuss discharge options.  Discharge planning with Renny on going. I think consents have been signed.  See brief note.    Palliative Care Follow Up: Palliative care team will Continue to follow while inpatient    A total of 80 minutes were spent on this visit, which included all of the following: direct face to face contact, history taking, physical examination, and >50% was spent counseling and coordinating care.    Cristiano Jauregui MD  Palliative Care Services  [facility] [phone]    Note to patient:  The 21st Century Cures Act makes medical notes like these available to patients in the interest of transparency. However, be advised this is a medical document. It is intended as peer to peer communication. It is written in medical language and may contain abbreviations or verbiage that are unfamiliar. It may appear blunt or direct. Medical documents are intended to carry relevant information, facts as evident, and the clinical opinion of the practitioner.           [1]   Past Medical History:   Anal cancer (HCC)   [2] History reviewed. No pertinent surgical history.  [3]   Family History  Problem Relation Age of Onset    Other (Hx of breast cancer) Mother    [4]   Allergies  Allergen Reactions    Shellfish Allergy HIVES    Shrimp RASH   [5]   Current Facility-Administered Medications:     HYDROmorphone in sodium chloride 0.9% (Dilaudid) 20 mg/100mL infusion premix, 0.2 mg/hr, Intravenous,  Continuous    lactulose (CHRONULAC) 10 GM/15ML solution 10 g, 10 g, Oral, TID PRN    ondansetron (Zofran-ODT) disintegrating tab 8 mg, 8 mg, Oral, Q6H PRN    hydrOXYzine HCl (ATARAX) 10 MG/5ML syrup 10 mg, 10 mg, Oral, Q6H PRN    acetaminophen (Ofirmev) 10 mg/mL infusion premix 600 mg, 15 mg/kg, Intravenous, Q8H PRN    LORazepam (Ativan) 2 mg/mL injection 1 mg, 1 mg, Intravenous, Q6H PRN    morphINE PF 4 MG/ML injection 4 mg, 4 mg, Intravenous, Q2H PRN    acetaminophen (Tylenol Extra Strength) tab 1,000 mg, 1,000 mg, Oral, Q6H PRN  [6]   Prior to Admission Medications   Medication Sig    hydrOXYzine HCl 10 MG/5ML Oral Syrup Take 5 mL (10 mg total) by mouth every 6 (six) hours as needed for Itching.    lactulose 10 GM/15ML Oral Solution Take 15 mL (10 g total) by mouth 3 (three) times daily as needed.    ondansetron 8 MG Oral Tablet Dispersible Take 1 tablet (8 mg total) by mouth every 6 (six) hours as needed.

## 2025-06-07 NOTE — DISCHARGE SUMMARY
[facility]  part of [facility]    Discharge Summary    Joseph Angel Patient Status:  Inpatient    1980 MRN [id number]   Location [facility] 4W/SW/SE Attending Lane Ontiveros MD   Hosp Day # 7 PCP None Pcp                Date of Admission: 2025   Date of Discharge: 2025    Admitting Diagnosis: Hyponatremia [E87.1]  Rectal bleeding [K62.5]  Cancer associated pain [G89.3]  Squamous cell cancer, anus (HCC) [C21.0]    Disposition: hospice     Discharge Diagnosis: .Principal Problem:    Squamous cell cancer, anus (HCC)  Active Problems:    Rectal bleeding    Cancer associated pain    Hyponatremia    Palliative care by specialist  Acute blood loss anemia    Hospital Course:   Reason for Admission: Rectal bleeding    Discharge Physical Exam:  Vital Signs:  Blood pressure 92/57, pulse 88, temperature 97.3 °F (36.3 °C), temperature source Axillary, resp. rate 16, height 5' 9\" (1.753 m), weight 87 lb (39.5 kg), SpO2 99%.     General: No acute distress. Alert  HEENT: Moist mucous membranes. EOM-I. PERRL  Neck: No lymphadenopathy.  No JVD. No carotid bruits.  Respiratory: Clear to auscultation bilaterally.  No wheezes. No rhonchi.  Cardiovascular: S1, S2.  Regular rate and rhythm.  No murmurs. Equal pulses   Abdomen: Soft, nontender, nondistended.  Positive bowel sounds. No rebound tenderness  Neurologic: No focal neurological deficits.    Integument: No lesions. No erythema.  Psychiatric: Alert    Hospital Course: Joseph Angel is a(n) 45 year old male.  With squamous cell anal cancer, he has a known history of anal cancer dating back to .  Patient has refused the treatment on multiple occasion patient came to the emergency room because of the increased pain and also patient was having some bleeding.  Patient wants to be DNR comfort care.  Patient tells me that he has lost a lot of weight and he is at the end.  He does not want a chemotherapy or radiation.  He also does not want  any surgery because patient tells me that he had seen Dr. Beverly in the past and he did not like what he has to say he does not want any colostomy.  Previous oncology notes were reviewed    Patient was admitted to hospital.  Patient was started on IV pain medications patient was seen by palliative care.  Patient was made comfort care.  Patient also seen by colorectal surgeon in the did not recommend any surgery for him as the tumor is unresectable now.  Oncology service also does not have any treatment offered to him at this time.  Patient also not in any condition to get any systemic treatment.  Patient was also given blood transfusion for acute blood loss anemia.  Patient agreed for hospice care.  And today the patient will be transferred to hospice care he does not want to go to inpatient hospice unit.  I called and spoke to his POA Tiffanie.    Complications:     Consultants         Provider   Role Specialty     George Carmona MD      Consulting Physician Surgical Oncology     Mann Scott MD      Consulting Physician SURGERY, GENERAL     Deyvi Conn DO      Consulting Physician Hematology and Oncology     Zee Gregory MD      Consulting Physician Hematology and Oncology            Pending Labs       Order Current Status    Blood Culture Preliminary result            Discharge Plan:   Discharge Condition: Serious    Current Discharge Medication List        New Orders    Details   hydrOXYzine HCl 10 MG/5ML Oral Syrup Take 5 mL (10 mg total) by mouth every 6 (six) hours as needed for Itching.      lactulose 10 GM/15ML Oral Solution Take 15 mL (10 g total) by mouth 3 (three) times daily as needed.      ondansetron 8 MG Oral Tablet Dispersible Take 1 tablet (8 mg total) by mouth every 6 (six) hours as needed.           Home Meds - Unchanged    Details   acetaminophen 160 MG/5ML Oral Solution Take 500-1,000 mg by mouth every 6 (six) hours as needed.                 Discharge Diet: As tolerated and  General diet    Discharge Activity: As tolerated    Follow up:       Follow up Labs:        Lane Ontiveros MD   6/7/2025    I spent 35 minutes in discharge planning for this patient, including extensive counseling regarding the patient's condition, recommendations regarding follow-up care, discussing with any applicable consultants, and arranging follow-up as indicated.

## 2025-06-07 NOTE — PROGRESS NOTES
[facility]  part of [facility]    Progress Note    Joseph Angel Patient Status:  Inpatient    1980 MRN [id number]   Location [facility] 4W/SW/SE Attending Lane Ontiveros MD   Hosp Day # 7 PCP None Pcp       SUBJECTIVE:  Feeling weak.  Nursing staff reported that patient is complaining of severe pain whenever he is moved.  OBJECTIVE:  Vital signs in last 24 hours:  BP 92/57 (BP Location: Right arm)   Pulse 88   Temp 97.3 °F (36.3 °C) (Axillary)   Resp 16   Ht 5' 9\" (1.753 m)   Wt 87 lb (39.5 kg)   SpO2 99%   BMI 12.85 kg/m²     Intake/Output:    Intake/Output Summary (Last 24 hours) at 2025 0925  Last data filed at 2025 1216  Gross per 24 hour   Intake 360 ml   Output --   Net 360 ml       Wt Readings from Last 3 Encounters:   25 87 lb (39.5 kg)   25 88 lb 2.9 oz (40 kg)   03/10/25 90 lb (40.8 kg)       Exam  Gen: No acute distress, alert  HEENT: Pallor is noted  Pulm: Lungs clear, normal respiratory effort  CV: Heart with regular rate and rhythm, no peripheral edema  Abd: Abdomen soft, nontender, nondistended, no organomegaly, bowel sounds present  Skin: no rashes or lesions  CNS: Alert  Extremities pedal edema noted    Data Review:     Labs:            LABS  Recent Labs   Lab 25  0643 25  1555 25  0631 25  1538 25  0839 25  0840   RBC 2.51*  --  2.71*  --   --  3.59*   HGB 6.0*   < > 6.9* 8.3*  --  8.9*   HCT 20.8*   < > 22.4* 27.0*  --  29.4*   MCV 82.9  --  82.7  --   --  81.9   MCH 23.9*  --  25.5*  --   --  24.8*   MCHC 28.8*  --  30.8*  --   --  30.3*   RDW 17.5*  --  17.2*  --   --  17.7*   NEPRELIM 22.66*  --  22.99*  --   --  18.32*   WBC 25.9*  --  26.2*  --   --  20.6*   .0  --  255.0  --   --  261.0   AST  --   --  13  --   --   --    ALT  --   --  7*  --   --   --    GLU 94  --  100*  --  148*  --     < > = values in this interval not displayed.       Imaging:      Meds:   Current Hospital  Medications[1]    Assessment  Problem List[2]  Acute on chronic blood loss anemia      Advanced anal cancer  Consulted colorectal surgery  Continue pain control.  Patient not a candidate for surgical treatment    Advanced anal cancer.  Patient seen seen by colorectal surgery as well as by the oncology.  Patient is not a candidate for any systemic therapy or any surgery at this time.    Prognosis is poor.    discussed with the nursing staff.    Discharge planning to hospice care  Patient may need to go to inpatient hospice unit.         Active Orders   Nourishments    Dietary Nutrition Supplements BID     Frequency: BID     Number of Occurrences: Until Specified     Order Comments: AM: vanilla flavor  PM: zeus flavor     General Supply    Compression stockings     Frequency: Once     Number of Occurrences: 1 Occurrences   Diet    Regular/General diet Texture Consistency: Regular; Is Patient on Accuchecks? No     Frequency: Effective Now     Number of Occurrences: Until Specified   Nursing    Hemoglobin post transfusion 45 min after transfusion is completed (RN to enter order)     Frequency: Once     Number of Occurrences: 1 Occurrences    Hemoglobin post transfusion 45 min after transfusion is completed (RN to enter order)     Frequency: Once     Number of Occurrences: 1 Occurrences    Initiate transfusion reaction protocol: if patient exhibits signs/symptoms of transfusion reaction     Frequency: PRN     Number of Occurrences: Until Specified    Initiate transfusion reaction protocol: if patient exhibits signs/symptoms of transfusion reaction     Frequency: PRN     Number of Occurrences: Until Specified    Insert/maintain PIV     Frequency: PRN     Number of Occurrences: Until Specified    Insert/maintain PIV     Frequency: PRN     Number of Occurrences: Until Specified    Notify physician     Frequency: Continuous     Number of Occurrences: Until Specified     Order Comments: Hold transfusion and notify physician  if the patient:   1) Complains of chills an/or abdominal/back pain  2) Shortness of breath  3) Chest pain   4) Restlessness   5) Infusion site pain   6) Sudden changes in vital signs ie: decrease in blood pressure or temperature: elevation of one degree Centigrade or 2 degrees fahrenheit from pre-transfusion temperature  7) Nausea/vomiting   8) Shock   9) Change in color of urine   10) Urticaria   11) Edema        Notify physician     Frequency: Continuous     Number of Occurrences: Until Specified     Order Comments: Hold transfusion and notify physician if the patient:   1) Complains of chills an/or abdominal/back pain  2) Shortness of breath  3) Chest pain   4) Restlessness   5) Infusion site pain   6) Sudden changes in vital signs ie: decrease in blood pressure or temperature: elevation of one degree Centigrade or 2 degrees fahrenheit from pre-transfusion temperature  7) Nausea/vomiting   8) Shock   9) Change in color of urine   10) Urticaria   11) Edema        Provide patient/family transfusion information     Frequency: Once     Number of Occurrences: 1 Occurrences    Provide patient/family transfusion information     Frequency: Once     Number of Occurrences: 1 Occurrences    Vital signs - Per blood administration policy     Frequency: Per Unit Routine     Number of Occurrences: Until Specified     Order Comments: Vital signs as follows; T, BP, HR, RR one set prior to transfusion, one set at 15 minutes, then one set hourly until transfusion complete. After transufusion complete resume previous VS frequency.        Vital signs - Per blood administration policy     Frequency: Per Unit Routine     Number of Occurrences: Until Specified     Order Comments: Vital signs as follows; T, BP, HR, RR one set prior to transfusion, one set at 15 minutes, then one set hourly until transfusion complete. After transufusion complete resume previous VS frequency.       Code Status    DNAR/Comfort care Continuous     Frequency:  Continuous     Number of Occurrences: Until Specified     Order Comments: Primary goal of maximizing comfort. Relieve pain & suffering through the use of medication by any route as needed; use oxygen, suctioning & manual treatment of airway obstruction.       Consult    Consult to General Surgery     Frequency: Once     Number of Occurrences: 1 Occurrences    Consult to General Surgery     Frequency: Once     Number of Occurrences: 1 Occurrences   Isolation    Enteric/Contact PLUS Isolation Continuous     Frequency: Continuous     Number of Occurrences: Until Specified   Medications    acetaminophen (Ofirmev) 10 mg/mL infusion premix 600 mg     Frequency: Q8H PRN     Dose: 15 mg/kg     Route: Intravenous    acetaminophen (Tylenol Extra Strength) tab 1,000 mg     Frequency: Q6H PRN     Dose: 1,000 mg     Route: Oral    hydrOXYzine HCl (ATARAX) 10 MG/5ML syrup 10 mg     Frequency: Q6H PRN     Dose: 10 mg     Route: Oral    lactulose (CHRONULAC) 10 GM/15ML solution 10 g     Frequency: TID PRN     Dose: 10 g     Route: Oral    LORazepam (Ativan) 2 mg/mL injection 1 mg     Frequency: Q6H PRN     Dose: 1 mg     Route: Intravenous    morphINE PF 4 MG/ML injection 4 mg     Frequency: Q2H PRN     Dose: 4 mg     Route: Intravenous    ondansetron (Zofran-ODT) disintegrating tab 8 mg     Frequency: Q6H PRN     Dose: 8 mg     Route: Oral       Lane Ontiveros MD  6/1/2025           [1]   Current Facility-Administered Medications   Medication Dose Route Frequency    lactulose (CHRONULAC) 10 GM/15ML solution 10 g  10 g Oral TID PRN    ondansetron (Zofran-ODT) disintegrating tab 8 mg  8 mg Oral Q6H PRN    hydrOXYzine HCl (ATARAX) 10 MG/5ML syrup 10 mg  10 mg Oral Q6H PRN    acetaminophen (Ofirmev) 10 mg/mL infusion premix 600 mg  15 mg/kg Intravenous Q8H PRN    LORazepam (Ativan) 2 mg/mL injection 1 mg  1 mg Intravenous Q6H PRN    morphINE PF 4 MG/ML injection 4 mg  4 mg Intravenous Q2H PRN    acetaminophen (Tylenol Extra  Strength) tab 1,000 mg  1,000 mg Oral Q6H PRN   [2]   Patient Active Problem List  Diagnosis    Squamous cell cancer, anus (HCC)    Iron deficiency anemia due to chronic blood loss    Rectal bleeding    Cancer associated pain    Hyponatremia    Palliative care by specialist

## 2025-06-07 NOTE — PLAN OF CARE
Patient is on room air and on a general diet. Patient is bedbound. Patient reported severe pain, prn ivp morphine 4mg x2 was administered. Patient also requested prn ivp ativan 1mg x2. Medications were effective. Patient is voiding freely. Safety measures are in place.       Problem: Patient Centered Care  Goal: Patient preferences are identified and integrated in the patient's plan of care  Description: Interventions:  - What would you like us to know as we care for you? I am from home.   - Provide timely, complete, and accurate information to patient/family  - Incorporate patient and family knowledge, values, beliefs, and cultural backgrounds into the planning and delivery of care  - Encourage patient/family to participate in care and decision-making at the level they choose  - Honor patient and family perspectives and choices  Outcome: Progressing     Problem: Patient/Family Goals  Goal: Patient/Family Long Term Goal  Description: Patient's Long Term Goal: Discharge home    Interventions:  - Monitor vitals  - Monitor labs  - Manage pain   - See additional Care Plan goals for specific interventions  Outcome: Progressing  Goal: Patient/Family Short Term Goal  Description: Patient's Short Term Goal: Manage pain     Interventions:   - Frequent pain assessments  - Non-pharmacological interventions  - Pain medications as needed/indicated  - See additional Care Plan goals for specific interventions  Outcome: Progressing     Problem: GASTROINTESTINAL - ADULT  Goal: Maintains or returns to baseline bowel function  Description: INTERVENTIONS:  - Assess bowel function  - Maintain adequate hydration with IV or PO as ordered and tolerated  - Evaluate effectiveness of GI medications  - Encourage mobilization and activity  - Obtain nutritional consult as needed  - Establish a toileting routine/schedule  - Consider collaborating with pharmacy to review patient's medication profile  Outcome: Progressing  Goal: Maintains adequate  nutritional intake (undernourished)  Description: INTERVENTIONS:  - Monitor percentage of each meal consumed  - Identify factors contributing to decreased intake, treat as appropriate  - Assist with meals as needed  - Monitor I&O, WT and lab values  - Obtain nutritional consult as needed  - Optimize oral hygiene and moisture  - Encourage food from home; allow for food preferences  - Enhance eating environment  Outcome: Progressing     Problem: PAIN - ADULT  Goal: Verbalizes/displays adequate comfort level or patient's stated pain goal  Description: INTERVENTIONS:  - Encourage pt to monitor pain and request assistance  - Assess pain using appropriate pain scale  - Administer analgesics based on type and severity of pain and evaluate response  - Implement non-pharmacological measures as appropriate and evaluate response  - Consider cultural and social influences on pain and pain management  - Manage/alleviate anxiety  - Utilize distraction and/or relaxation techniques  - Monitor for opioid side effects  - Notify MD/LIP if interventions unsuccessful or patient reports new pain  - Anticipate increased pain with activity and pre-medicate as appropriate  Outcome: Progressing     Problem: SAFETY ADULT - FALL  Goal: Free from fall injury  Description: INTERVENTIONS:  - Assess pt frequently for physical needs  - Identify cognitive and physical deficits and behaviors that affect risk of falls.  - [place] fall precautions as indicated by assessment.  - Educate pt/family on patient safety including physical limitations  - Instruct pt to call for assistance with activity based on assessment  - Modify environment to reduce risk of injury  - Provide assistive devices as appropriate  - Consider OT/PT consult to assist with strengthening/mobility  - Encourage toileting schedule  Outcome: Progressing     Problem: DISCHARGE PLANNING  Goal: Discharge to home or other facility with appropriate resources  Description: INTERVENTIONS:  -  Identify barriers to discharge w/pt and caregiver  - Include patient/family/discharge partner in discharge planning  - Arrange for needed discharge resources and transportation as appropriate  - Identify discharge learning needs (meds, wound care, etc)  - Arrange for interpreters to assist at discharge as needed  - Consider post-discharge preferences of patient/family/discharge partner  - Complete POLST form as appropriate  - Assess patient's ability to be responsible for managing their own health  - Refer to Case Management Department for coordinating discharge planning if the patient needs post-hospital services based on physician/LIP order or complex needs related to functional status, cognitive ability or social support system  Outcome: Progressing     Problem: SKIN/TISSUE INTEGRITY - ADULT  Goal: Skin integrity remains intact  Description: INTERVENTIONS  - Assess and document risk factors for pressure ulcer development  - Assess and document skin integrity  - Monitor for areas of redness and/or skin breakdown  - Initiate interventions, skin care algorithm/standards of care as needed  Outcome: Progressing  Goal: Incision(s), wounds(s) or drain site(s) healing without S/S of infection  Description: INTERVENTIONS:  - Assess and document risk factors for pressure ulcer development  - Assess and document skin integrity  - Assess and document dressing/incision, wound bed, drain sites and surrounding tissue  - Implement wound care per orders  - Initiate isolation precautions as appropriate  - Initiate Pressure Ulcer prevention bundle as indicated  Outcome: Progressing  Goal: Oral mucous membranes remain intact  Description: INTERVENTIONS  - Assess oral mucosa and hygiene practices  - Implement preventative oral hygiene regimen  - Implement oral medicated treatments as ordered  Outcome: Progressing

## 2025-06-07 NOTE — PALLIATIVE CARE NOTE
Brief Palliative Care Note    Patient increasingly lethargic. He will arouse to voice but quickly sleeps. Getting IV prn pushes of morphine. POA is trying to honor his wish to be home, working with [facility].     I explained that it is unlikely that he will be well enough to benefit from transfer, and may need to consider GIP. POA is understanding. She says that she is not ready for discharge ANYWAY, since there is no caregiving set up yet. Apparently Joseph's half-sister is agreeing to pay for caregiving, but this is being coordinated through his mother.     I spoke to [facility], they use dilaudid in their CADD pumps, so I will initiate dilaudid infusion. In total doses, he used 28mg of morphine over 24hr which equals roughly 1.16mg/hr, or 0.23 mg of IV dilaudid. Start 0.2mg/hr.    I will again communicate to POA that he will most likely pass in the next several hours to short days, and probably will benefit from GIP via [facility].     PLAN:    -dilaudid infusion   -continue prn morphine but unlikely to request unless severe pain   -if he is well enough, plan for home discharge once caregiving arranged   -unfortunately I do not expect this to be successful so pivot to GIP tomorrow am most likely    Cristiano Jauregui MD  Palliative Care Services  [facility] [phone]

## 2025-06-07 NOTE — PROGRESS NOTES
I called POA   and updated  her about patient  condition  She informed me that patient does not want to go to inpatient unit.  And all the hospice staff is going to be dropped off at home.  Patient wants to die at home.  She feels that he will pass away.  Shortly.  Discussed with the nursing staff.

## 2025-06-08 ENCOUNTER — HOSPITAL ENCOUNTER (INPATIENT)
Facility: HOSPITAL | Age: 45
End: 2025-06-08
Attending: INTERNAL MEDICINE | Admitting: INTERNAL MEDICINE
Payer: OTHER MISCELLANEOUS

## 2025-06-08 NOTE — PLAN OF CARE
Very lethargic. Most of shift could barely speak above a whisper. RA. Had 2 episodes of incontinence, cried out in pain during linen change and was premedicated w/ IVP morphine. Per POA, equipment for home hospice was delivered to pt's home, but no caregiver set up yet. Pt lives alone. Dilaudid drip was ordered, endorsing to PM RN.     Problem: Patient Centered Care  Goal: Patient preferences are identified and integrated in the patient's plan of care  Description: Interventions:  - What would you like us to know as we care for you? I miss my dog.  - Provide timely, complete, and accurate information to patient/family  - Incorporate patient and family knowledge, values, beliefs, and cultural backgrounds into the planning and delivery of care  - Encourage patient/family to participate in care and decision-making at the level they choose  - Honor patient and family perspectives and choices  Outcome: Progressing

## 2025-06-08 NOTE — HOSPICE RN NOTE
[facility] RN admitted the patient to general inpatient hospice per Dr. Ontiveros and Dr. Bull. Patient is eligible for general inpatient hospice care for symptom management of Pain and agitation requiring frequent nursing assessments and interventions including titration of IV medications.    Chart flipped.    All information is to go through [facility] only. No family is allowed to receive any information. Friends can be told to get info from [facility].    Comfort order set placed. Medications: Comfort kit    Regular diet with pleasure feeding.    Fall and safety precautions in place.    PPS: 20%    POC discussed with [facility], Dr. Arias, Dr. Bull and Miriam BANDA. All in agreement. [facility] to follow daily to support the patient and family.      Nadja Chase BSN, RN  Transitional Nurse Liaison  [facility]  [phone] (After-hours)

## 2025-06-08 NOTE — PLAN OF CARE
Chart flipped to hospice GIP. lethargic.  KHURRAM Laura gtt titrated up this am.  IVP morphine for bedding change after episode of incontinence. Family at bedside.

## 2025-06-08 NOTE — PROGRESS NOTES
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 rate 14, height 5' 9\" (1.753 m), weight 87 lb (39.5 kg), SpO2 100%.     General: No acute distress. Alert  HEENT: Moist mucous membranes. EOM-I. PERRL  Neck: No lymphadenopathy.  No JVD. No carotid bruits.  Respiratory: Clear to auscultation bilaterally.  No wheezes. No rhonchi.  Cardiovascular: S1, S2.  Regular rate and rhythm.  No murmurs. Equal pulses   Abdomen: Soft, nontender, nondistended.  Positive bowel sounds. No rebound tenderness  Neurologic: No focal neurological deficits.    Integument: No lesions. No erythema.  Psychiatric: Alert    Hospital Course: Joseph Angel is a(n) 45 year old male.  With squamous cell anal cancer, he has a known history of anal cancer dating back to .  Patient has refused the treatment on multiple occasion patient came to the emergency room because of the increased pain and also patient was having some bleeding.  Patient wants to be DNR comfort care.  Patient tells  me that he has lost a lot of weight and he is at the end.  He does not want a chemotherapy or radiation.  He also does not want any surgery because patient tells me that he had seen Dr. Beverly in the past and he did not like what he has to say he does not want any colostomy.  Previous oncology notes were reviewed    Patient was admitted to hospital.  Patient was started on IV pain medications patient was seen by palliative care.  Patient was made comfort care.  Patient also seen by colorectal surgeon in the did not recommend any surgery for him as the tumor is unresectable now.  Oncology service also does not have any treatment offered to him at this time.  Patient also not in any condition to get any systemic treatment.  Patient was also given blood transfusion for acute blood loss anemia.  Patient agreed for hospice care.  And today the patient will be transferred to hospice care he does not want to go to inpatient hospice unit.  I called and spoke to his [facility].    Complications:     Consultants         Provider   Role Specialty     George Carmona MD      Consulting Physician Surgical Oncology     Mann Scott MD      Consulting Physician SURGERY, GENERAL     Deyvi Conn DO      Consulting Physician Hematology and Oncology     Zee Gregory MD      Consulting Physician Hematology and Oncology                  Discharge Plan:   Discharge Condition: Serious    Current Discharge Medication List        New Orders    Details   hydrOXYzine HCl 10 MG/5ML Oral Syrup Take 5 mL (10 mg total) by mouth every 6 (six) hours as needed for Itching.      lactulose 10 GM/15ML Oral Solution Take 15 mL (10 g total) by mouth 3 (three) times daily as needed.      ondansetron 8 MG Oral Tablet Dispersible Take 1 tablet (8 mg total) by mouth every 6 (six) hours as needed.           Home Meds - Unchanged    Details   acetaminophen 160 MG/5ML Oral Solution Take 500-1,000 mg by mouth every 6 (six) hours as needed.                  Discharge Diet: As tolerated and General diet    Discharge Activity: As tolerated    Follow up:       Follow up Labs:        Lane Ontiveros MD   6/7/2025    I spent 35 minutes in discharge planning for this patient, including extensive counseling regarding the patient's condition, recommendations regarding follow-up care, discussing with any applicable consultants, and arranging follow-up as indicated.

## 2025-06-08 NOTE — SPIRITUAL CARE NOTE
Spiritual Care Visit Note    Patient Name: Joseph Angel Date of Spiritual Care Visit: 25   : 1980 Primary Dx: Squamous cell cancer, anus (HCC)       Referred By: Referral From: , Hospice    Spiritual Care Taxonomy:    Intended Effects: Meaning-Making    Methods: Assist with spiritual/Taoist practices, Collaborate with care team member, Offer spiritual/Taoist support    Interventions: Acknowledge current situation, Acknowledge response to difficult experience, Active listening, Ask guided questions, Ask guided questions about cultural and Taoist values, Ask guided questions about anh, [place], Provide hospitality, Incorporate cultural and Taoist needs in plan of care, Connect someone with their anh community/clergy, Facilitate preparing for end of life, Explain  role    Visit Type/Summary:     - Spiritual Care: Responded to a request for spiritual care and assessed patient and loved ones for spiritual care needs. Consulted with RN prior to visit. Offered empathic listening and emotional support. Loved ones expressed appreciation for  visit. Provided support for Patient spiritual/Taoist requests. Offered prayer. Coordinated  visit for Sacrament of the Sick. Provided information regarding how to contact Spiritual Care and left a Spiritual Care information card. Prayed for a peaceful death.  remains available as needed for follow up.    Spiritual Care support can be requested via an Epic consult. For urgent/immediate needs, please contact the On Call  at: [place]: ext [phone]    Chaplain Resident, Faith Landry, PhD

## 2025-06-08 NOTE — PLAN OF CARE
Joseph is A&Ox4, lethargic. Dilaudid gtt initiated for pain control. Given morphine IVP PRN. Repositioning as tolerated. On primofit overnight for incontinence. Friends and family at bedside throughout night    Problem: Patient Centered Care  Goal: Patient preferences are identified and integrated in the patient's plan of care  Description: Interventions:  - What would you like us to know as we care for you? I am from home.   - Provide timely, complete, and accurate information to patient/family  - Incorporate patient and family knowledge, values, beliefs, and cultural backgrounds into the planning and delivery of care  - Encourage patient/family to participate in care and decision-making at the level they choose  - Honor patient and family perspectives and choices  Outcome: Progressing     Problem: Patient/Family Goals  Goal: Patient/Family Long Term Goal  Description: Patient's Long Term Goal: Discharge home    Interventions:  - Monitor vitals  - Monitor labs  - Manage pain   - See additional Care Plan goals for specific interventions  Outcome: Progressing  Goal: Patient/Family Short Term Goal  Description: Patient's Short Term Goal: Manage pain     Interventions:   - Frequent pain assessments  - Non-pharmacological interventions  - Pain medications as needed/indicated  - See additional Care Plan goals for specific interventions  Outcome: Progressing     Problem: GASTROINTESTINAL - ADULT  Goal: Maintains or returns to baseline bowel function  Description: INTERVENTIONS:  - Assess bowel function  - Maintain adequate hydration with IV or PO as ordered and tolerated  - Evaluate effectiveness of GI medications  - Encourage mobilization and activity  - Obtain nutritional consult as needed  - Establish a toileting routine/schedule  - Consider collaborating with pharmacy to review patient's medication profile  Outcome: Progressing  Goal: Maintains adequate nutritional intake (undernourished)  Description: INTERVENTIONS:  -  Monitor percentage of each meal consumed  - Identify factors contributing to decreased intake, treat as appropriate  - Assist with meals as needed  - Monitor I&O, WT and lab values  - Obtain nutritional consult as needed  - Optimize oral hygiene and moisture  - Encourage food from home; allow for food preferences  - Enhance eating environment  Outcome: Progressing     Problem: PAIN - ADULT  Goal: Verbalizes/displays adequate comfort level or patient's stated pain goal  Description: INTERVENTIONS:  - Encourage pt to monitor pain and request assistance  - Assess pain using appropriate pain scale  - Administer analgesics based on type and severity of pain and evaluate response  - Implement non-pharmacological measures as appropriate and evaluate response  - Consider cultural and social influences on pain and pain management  - Manage/alleviate anxiety  - Utilize distraction and/or relaxation techniques  - Monitor for opioid side effects  - Notify MD/LIP if interventions unsuccessful or patient reports new pain  - Anticipate increased pain with activity and pre-medicate as appropriate  Outcome: Progressing     Problem: SAFETY ADULT - FALL  Goal: Free from fall injury  Description: INTERVENTIONS:  - Assess pt frequently for physical needs  - Identify cognitive and physical deficits and behaviors that affect risk of falls.  - [place] fall precautions as indicated by assessment.  - Educate pt/family on patient safety including physical limitations  - Instruct pt to call for assistance with activity based on assessment  - Modify environment to reduce risk of injury  - Provide assistive devices as appropriate  - Consider OT/PT consult to assist with strengthening/mobility  - Encourage toileting schedule  Outcome: Progressing     Problem: DISCHARGE PLANNING  Goal: Discharge to home or other facility with appropriate resources  Description: INTERVENTIONS:  - Identify barriers to discharge w/pt and caregiver  - Include  patient/family/discharge partner in discharge planning  - Arrange for needed discharge resources and transportation as appropriate  - Identify discharge learning needs (meds, wound care, etc)  - Arrange for interpreters to assist at discharge as needed  - Consider post-discharge preferences of patient/family/discharge partner  - Complete POLST form as appropriate  - Assess patient's ability to be responsible for managing their own health  - Refer to Case Management Department for coordinating discharge planning if the patient needs post-hospital services based on physician/LIP order or complex needs related to functional status, cognitive ability or social support system  Outcome: Progressing     Problem: SKIN/TISSUE INTEGRITY - ADULT  Goal: Skin integrity remains intact  Description: INTERVENTIONS  - Assess and document risk factors for pressure ulcer development  - Assess and document skin integrity  - Monitor for areas of redness and/or skin breakdown  - Initiate interventions, skin care algorithm/standards of care as needed  Outcome: Progressing  Goal: Incision(s), wounds(s) or drain site(s) healing without S/S of infection  Description: INTERVENTIONS:  - Assess and document risk factors for pressure ulcer development  - Assess and document skin integrity  - Assess and document dressing/incision, wound bed, drain sites and surrounding tissue  - Implement wound care per orders  - Initiate isolation precautions as appropriate  - Initiate Pressure Ulcer prevention bundle as indicated  Outcome: Progressing  Goal: Oral mucous membranes remain intact  Description: INTERVENTIONS  - Assess oral mucosa and hygiene practices  - Implement preventative oral hygiene regimen  - Implement oral medicated treatments as ordered  Outcome: Progressing

## 2025-06-08 NOTE — CM/SW NOTE
MDO [facility]ospice GIP    Called and notified [facility] liaison of order.       Katherine Huang, MSW, LSW

## 2025-06-09 NOTE — SPIRITUAL CARE NOTE
Provided brief introduction. Several visitors in the room and mood in the room was upbeat. No questions, aware how to contact  if needed.

## 2025-06-09 NOTE — H&P
[facility]  part of [facility]    History & Physical    Joseph Angel Patient Status:  Inpatient    1980 MRN [id number]   Location [facility] 4W/SW/SE Attending Lane Ontiveros MD   Hosp Day # 1 PCP None Pcp     Date:  2025  Date of Admission:  2025    History provided by:patient and review of the medical records  Chief Complaint:   Pain in the anal area  HPI:   Joseph Angel is a(n) 45 year old male.  Advanced squamous cell anal carcinoma, severe protein calorie malnutrition, who is now admitted to inpatient hospice care.  Patient complaining of having pain in the anal area.  Otherwise no chest pain or shortness of breath.  Nursing staff reports no complaints.  Hospice nurse wants to add fentanyl and also need to transition the patient to oral Dilaudid    History   Past Medical History[1]  Past Surgical History[2]  Family History[3]  Social History:  Short Social Hx on File[4]  Allergies/Medications:   Allergies: Allergies[5]  Prescriptions Prior to Admission[6]    Review of Systems:   Pertinent items are noted in HPI.    Physical Exam:   Vital Signs:  Blood pressure 96/65, pulse 110, temperature 98.8 °F (37.1 °C), temperature source Oral, resp. rate 16, height 5' 9.02\" (1.753 m), weight 87 lb 1.3 oz (39.5 kg), SpO2 99%.  Patient is lying comfortably in bed.  HEENT pallor is noted  Neck no JVD  Heart S1 and S2 regular in rate and rhythm  The lungs are clear to auscultation  Abdomen is soft bowel sounds are present  Extremities no pedal edema  Rectal examination patient has anal cancer.  CNS examination patient is alert.            Results:     Lab Results   Component Value Date    WBC 20.6 (H) 2025    HGB 8.9 (L) 2025    HCT 29.4 (L) 2025    .0 2025    CREATSERUM 0.63 (L) 2025    BUN 9 2025     (L) 2025    K 3.5 2025     2025    CO2 21.0 2025     (H) 2025    CA 9.2 2025    ALB  2.2 (L) 06/03/2025    ALKPHO 102 06/03/2025    BILT 0.5 06/03/2025    TP 4.7 (L) 06/03/2025    AST 13 06/03/2025    ALT 7 (L) 06/03/2025    PTT 28.8 10/28/2024    INR 1.05 03/10/2025       No results found.        Assessment/Plan:     Squamous cell cancer, anus (HCC)  With metastasis.  Continue pain control.  Continue hospice care.    Severe protein calorie malnutrition.      Anemia of the malignancy.    I discussed with the nursing staff.  Discussed with the hospice nursing staff.        Active Orders   Respiratory Care    Oxygen administration (adult) PRN     Frequency: PRN     Number of Occurrences: Until Specified     Order Comments: Pre-oxygenate with 100% for 30 minutes prior to prone/supine turn     Microbiology    Clostridium difficile(toxigenic)PCR     Frequency: Once     Number of Occurrences: 1 Occurrences   Diet    Regular/General diet Is Patient on Accuchecks? No; Misc Restriction: Pleasure Feed     Frequency: Effective Now     Number of Occurrences: Until Specified   Nursing    Activity as tolerated Until Discontinued     Frequency: Until Discontinued     Number of Occurrences: Until Specified    End of life care, comfort measures     Frequency: Until Discontinued     Number of Occurrences: Until Specified    Oral care     Frequency: PRN     Number of Occurrences: Until Specified    Oral suction     Frequency: PRN     Number of Occurrences: Until Specified     Order Comments: minimize deep suctioning, clear upper airway secretions as appropriate      Place/ Maintain PIV     Frequency: Once     Number of Occurrences: 1 Occurrences    Straight cath     Frequency: PRN     Number of Occurrences: Until Specified    Vital signs     Frequency: Q Shift     Number of Occurrences: Until Specified    Wound care     Frequency: PRN     Number of Occurrences: Until Specified     Order Comments: May use  Transparent, Foam, Hydrocolloid and/or Hydrofiber dressing as needed.     Code Status    DNAR/Comfort care  Continuous     Frequency: Continuous     Number of Occurrences: Until Specified     Order Comments: Primary goal of maximizing comfort. Relieve pain & suffering through the use of medication by any route as needed; use oxygen, suctioning & manual treatment of airway obstruction.       Isolation    Enteric/Contact PLUS Isolation Continuous     Frequency: Continuous     Number of Occurrences: Until Specified   Medications    acetaminophen (Ofirmev) 10 mg/mL infusion premix 600 mg     Frequency: Q6H PRN     Dose: 15 mg/kg     Route: Intravenous    atropine (Atropine-Care) 1 % ophthalmic solution 2 drop     Frequency: Q2H PRN     Dose: 2 drop     Route: Oral    furosemide (Lasix) 10 mg/mL injection 40 mg     Frequency: Q8H PRN     Dose: 40 mg     Route: Intravenous    glycerin-hypromellose- (Artificial Tears) 0.2-0.2-1 % ophthalmic solution 2 drop     Frequency: Q1H PRN     Dose: 2 drop     Route: Both Eyes    glycopyrrolate (Robinul) 0.2 MG/ML injection 0.4 mg     Frequency: Q3H PRN     Dose: 0.4 mg     Route: Intravenous    haloperidol lactate (Haldol) 5 MG/ML injection 1 mg     Linked Order: Or     Frequency: Q1H PRN     Dose: 1 mg     Route: Intravenous    haloperidol lactate (Haldol) 5 MG/ML injection 2 mg     Linked Order: Or     Frequency: Q1H PRN     Dose: 2 mg     Route: Intravenous    HYDROmorphone (Dilaudid) 1 MG/ML injection 0.5 mg     Linked Order: Or     Frequency: Q2H PRN     Dose: 0.5 mg     Route: Intravenous    HYDROmorphone (Dilaudid) 1 MG/ML injection 1 mg     Linked Order: Or     Frequency: Q2H PRN     Dose: 1 mg     Route: Intravenous    HYDROmorphone (Dilaudid) 1 MG/ML injection 2 mg     Linked Order: Or     Frequency: Q2H PRN     Dose: 2 mg     Route: Intravenous    HYDROmorphone in sodium chloride 0.9% (Dilaudid) 20 mg/100mL infusion premix     Frequency: Continuous     Dose: 0.2-4 mg/hr     Route: Intravenous    LORazepam (Ativan) 2 mg/mL injection 1 mg     Linked Order: Or      Frequency: Q4H PRN     Dose: 1 mg     Route: Intravenous    LORazepam (Ativan) 2 mg/mL injection 2 mg     Linked Order: Or     Frequency: Q4H PRN     Dose: 2 mg     Route: Intravenous    ondansetron (Zofran) 4 MG/2ML injection 4 mg     Frequency: Q6H PRN     Dose: 4 mg     Route: Intravenous    scopolamine (Transderm-Scop) 1 MG/3DAYS patch 1 patch     Frequency: Q72H     Dose: 1 patch     Route: Transdermal    sodium chloride 0.9% 0.9% flush injection 10 mL     Frequency: PRN     Dose: 10 mL     Route: Intravenous       Lane Ontiveros MD  6/9/2025          [1]   Past Medical History:   Anal cancer (HCC)   [2] No past surgical history on file.  [3]   Family History  Problem Relation Age of Onset    Other (Hx of breast cancer) Mother    [4]   Social History  Socioeconomic History    Marital status: Single   Tobacco Use    Smoking status: Never    Smokeless tobacco: Never   Substance and Sexual Activity    Alcohol use: Never    Drug use: Never     Social Drivers of Health     Food Insecurity: No Food Insecurity (6/1/2025)    NCSS - Food Insecurity     Worried About Running Out of Food in the Last Year: No     Ran Out of Food in the Last Year: No   Transportation Needs: No Transportation Needs (6/1/2025)    NCSS - Transportation     Lack of Transportation: No   Housing Stability: Not At Risk (6/1/2025)    NCSS - Housing/Utilities     Has Housing: Yes     Worried About Losing Housing: No     Unable to Get Utilities: No   [5]   Allergies  Allergen Reactions    Shellfish Allergy HIVES    Shrimp RASH   [6]   Medications Prior to Admission   Medication Sig    hydrOXYzine HCl 10 MG/5ML Oral Syrup Take 5 mL (10 mg total) by mouth every 6 (six) hours as needed for Itching.    lactulose 10 GM/15ML Oral Solution Take 15 mL (10 g total) by mouth 3 (three) times daily as needed.    ondansetron 8 MG Oral Tablet Dispersible Take 1 tablet (8 mg total) by mouth every 6 (six) hours as needed.    acetaminophen 160 MG/5ML Oral Solution  Take 500-1,000 mg by mouth every 6 (six) hours as needed.

## 2025-06-09 NOTE — DISCHARGE SUMMARY
[facility]  part of [facility]    Discharge Summary    Joseph Angel Patient Status:  Inpatient    1980 MRN [id number]   Location [facility] 4W/SW/SE Attending Lane Ontiveros MD   Hosp Day # 0 PCP None Pcp                Date of Admission: 2025   Date of Discharge: 2025    Admitting Diagnosis: Hyponatremia [E87.1]  Rectal bleeding [K62.5]  Cancer associated pain [G89.3]  Squamous cell cancer, anus (HCC) [C21.0]    Disposition: hospice     Discharge Diagnosis: .Active Problems:    Squamous cell cancer, anus (HCC)  Acute blood loss anemia    Hospital Course:   Reason for Admission: Rectal bleeding    Discharge Physical Exam:  Vital Signs:  Height 5' 9.02\" (1.753 m), weight 87 lb 1.3 oz (39.5 kg).     General: No acute distress. Alert  HEENT: Moist mucous membranes. EOM-I. PERRL  Neck: No lymphadenopathy.  No JVD. No carotid bruits.  Respiratory: Clear to auscultation bilaterally.  No wheezes. No rhonchi.  Cardiovascular: S1, S2.  Regular rate and rhythm.  No murmurs. Equal pulses   Abdomen: Soft, nontender, nondistended.  Positive bowel sounds. No rebound tenderness  Neurologic: No focal neurological deficits.    Integument: No lesions. No erythema.  Psychiatric: Alert    Hospital Course: Joseph Angel is a(n) 45 year old male.  With squamous cell anal cancer, he has a known history of anal cancer dating back to .  Patient has refused the treatment on multiple occasion patient came to the emergency room because of the increased pain and also patient was having some bleeding.  Patient wants to be DNR comfort care.  Patient tells me that he has lost a lot of weight and he is at the end.  He does not want a chemotherapy or radiation.  He also does not want any surgery because patient tells me that he had seen Dr. Beverly in the past and he did not like what he has to say he does not want any colostomy.  Previous oncology notes were reviewed    Patient was admitted to  hospital.  Patient was started on IV pain medications patient was seen by palliative care.  Patient was made comfort care.  Patient also seen by colorectal surgeon in the did not recommend any surgery for him as the tumor is unresectable now.  Oncology service also does not have any treatment offered to him at this time.  Patient also not in any condition to get any systemic treatment.  Patient was also given blood transfusion for acute blood loss anemia.  Patient agreed for hospice care.   20 the patient is admitted to the hospital inpatient hospice care unit.    Discussed with the palliative care physician.    Complications:     Consultants         Provider   Role Specialty     George Carmona MD      Consulting Physician Surgical Oncology     Mann Scott MD      Consulting Physician SURGERY, GENERAL     Deyvi Conn DO      Consulting Physician Hematology and Oncology     Zee Gregory MD      Consulting Physician Hematology and Oncology                  Discharge Plan:   Discharge Condition: Serious    Current Discharge Medication List        New Orders    Details   hydrOXYzine HCl 10 MG/5ML Oral Syrup Take 5 mL (10 mg total) by mouth every 6 (six) hours as needed for Itching.      lactulose 10 GM/15ML Oral Solution Take 15 mL (10 g total) by mouth 3 (three) times daily as needed.      ondansetron 8 MG Oral Tablet Dispersible Take 1 tablet (8 mg total) by mouth every 6 (six) hours as needed.           Home Meds - Unchanged    Details   acetaminophen 160 MG/5ML Oral Solution Take 500-1,000 mg by mouth every 6 (six) hours as needed.                 Discharge Diet: As tolerated and General diet    Discharge Activity: As tolerated    Follow up:       Follow up Labs:        Lane Ontiveros MD     I spent 35 minutes in discharge planning for this patient, including extensive counseling regarding the patient's condition, recommendations regarding follow-up care, discussing with any applicable  consultants, and arranging follow-up as indicated.

## 2025-06-09 NOTE — PROGRESS NOTES
[facility]  part of [facility]    Progress Note    Joseph Angel Patient Status:  Inpatient    1980 MRN [id number]   Location [facility] 4W/SW/SE Attending Lane Ontiveros MD   Hosp Day # 0 PCP None Pcp       SUBJECTIVE:  Nursing staff reports no new complaints  Patient has been admitted to inpatient hospice unit.  Power of  is at the bedside  OBJECTIVE:  Vital signs in last 24 hours:  Ht 5' 9.02\" (1.753 m)   Wt 87 lb 1.3 oz (39.5 kg)   BMI 12.85 kg/m²     Intake/Output:    Intake/Output Summary (Last 24 hours) at 2025  Last data filed at 2025 1921  Gross per 24 hour   Intake 16.65 ml   Output --   Net 16.65 ml       Wt Readings from Last 3 Encounters:   25 87 lb 1.3 oz (39.5 kg)   25 87 lb (39.5 kg)   25 88 lb 2.9 oz (40 kg)       Exam  Gen: No acute distress, alert  HEENT: Pallor is noted  Pulm: Lungs clear, anteriorly  CV: Heart with regular rate and rhythm, no peripheral edema  Abd: Abdomen soft, nontender, nondistended, no organomegaly, bowel sounds present  Skin: no rashes or lesions  CNS: Alert  Extremities pedal edema noted    Data Review:     Labs:            LABS  Recent Labs   Lab 25  0643 25  1555 25  0631 25  1538 25  0839 25  0840   RBC 2.51*  --  2.71*  --   --  3.59*   HGB 6.0*   < > 6.9* 8.3*  --  8.9*   HCT 20.8*   < > 22.4* 27.0*  --  29.4*   MCV 82.9  --  82.7  --   --  81.9   MCH 23.9*  --  25.5*  --   --  24.8*   MCHC 28.8*  --  30.8*  --   --  30.3*   RDW 17.5*  --  17.2*  --   --  17.7*   NEPRELIM 22.66*  --  22.99*  --   --  18.32*   WBC 25.9*  --  26.2*  --   --  20.6*   .0  --  255.0  --   --  261.0   AST  --   --  13  --   --   --    ALT  --   --  7*  --   --   --    GLU 94  --  100*  --  148*  --     < > = values in this interval not displayed.       Imaging:      Meds:   Current Hospital Medications[1]    Assessment  Problem List[2]  Acute on chronic blood loss  anemia      Advanced anal cancer  Consulted colorectal surgery  Continue pain control.  Patient not a candidate for surgical treatment    Advanced anal cancer.  Patient seen seen by colorectal surgery as well as by the oncology.  Patient is not a candidate for any systemic therapy or any surgery at this time.    Prognosis is poor.    discussed with the nursing staff.    Discharge planning   Patient admitted to inpatient hospice care unit         Active Orders   Respiratory Care    Oxygen administration (adult) PRN     Frequency: PRN     Number of Occurrences: Until Specified     Order Comments: Pre-oxygenate with 100% for 30 minutes prior to prone/supine turn     Diet    Regular/General diet Is Patient on Accuchecks? No; Misc Restriction: Pleasure Feed     Frequency: Effective Now     Number of Occurrences: Until Specified   Nursing    Activity as tolerated Until Discontinued     Frequency: Until Discontinued     Number of Occurrences: Until Specified    End of life care, comfort measures     Frequency: Until Discontinued     Number of Occurrences: Until Specified    Oral care     Frequency: PRN     Number of Occurrences: Until Specified    Oral suction     Frequency: PRN     Number of Occurrences: Until Specified     Order Comments: minimize deep suctioning, clear upper airway secretions as appropriate      Place/ Maintain PIV     Frequency: Once     Number of Occurrences: 1 Occurrences    Straight cath     Frequency: PRN     Number of Occurrences: Until Specified    Vital signs     Frequency: Q Shift     Number of Occurrences: Until Specified    Wound care     Frequency: PRN     Number of Occurrences: Until Specified     Order Comments: May use  Transparent, Foam, Hydrocolloid and/or Hydrofiber dressing as needed.     Code Status    DNAR/Comfort care Continuous     Frequency: Continuous     Number of Occurrences: Until Specified     Order Comments: Primary goal of maximizing comfort. Relieve pain & suffering  through the use of medication by any route as needed; use oxygen, suctioning & manual treatment of airway obstruction.       Medications    acetaminophen (Ofirmev) 10 mg/mL infusion premix 600 mg     Frequency: Q6H PRN     Dose: 15 mg/kg     Route: Intravenous    atropine (Atropine-Care) 1 % ophthalmic solution 2 drop     Frequency: Q2H PRN     Dose: 2 drop     Route: Oral    furosemide (Lasix) 10 mg/mL injection 40 mg     Frequency: Q8H PRN     Dose: 40 mg     Route: Intravenous    glycerin-hypromellose- (Artificial Tears) 0.2-0.2-1 % ophthalmic solution 2 drop     Frequency: Q1H PRN     Dose: 2 drop     Route: Both Eyes    glycopyrrolate (Robinul) 0.2 MG/ML injection 0.4 mg     Frequency: Q3H PRN     Dose: 0.4 mg     Route: Intravenous    haloperidol lactate (Haldol) 5 MG/ML injection 1 mg     Linked Order: Or     Frequency: Q1H PRN     Dose: 1 mg     Route: Intravenous    haloperidol lactate (Haldol) 5 MG/ML injection 2 mg     Linked Order: Or     Frequency: Q1H PRN     Dose: 2 mg     Route: Intravenous    HYDROmorphone (Dilaudid) 1 MG/ML injection 0.5 mg     Linked Order: Or     Frequency: Q2H PRN     Dose: 0.5 mg     Route: Intravenous    HYDROmorphone (Dilaudid) 1 MG/ML injection 1 mg     Linked Order: Or     Frequency: Q2H PRN     Dose: 1 mg     Route: Intravenous    HYDROmorphone (Dilaudid) 1 MG/ML injection 2 mg     Linked Order: Or     Frequency: Q2H PRN     Dose: 2 mg     Route: Intravenous    HYDROmorphone in sodium chloride 0.9% (Dilaudid) 20 mg/100mL infusion premix     Frequency: Continuous     Dose: 0.2-4 mg/hr     Route: Intravenous    LORazepam (Ativan) 2 mg/mL injection 1 mg     Linked Order: Or     Frequency: Q4H PRN     Dose: 1 mg     Route: Intravenous    LORazepam (Ativan) 2 mg/mL injection 2 mg     Linked Order: Or     Frequency: Q4H PRN     Dose: 2 mg     Route: Intravenous    ondansetron (Zofran) 4 MG/2ML injection 4 mg     Frequency: Q6H PRN     Dose: 4 mg     Route: Intravenous     scopolamine (Transderm-Scop) 1 MG/3DAYS patch 1 patch     Frequency: Q72H     Dose: 1 patch     Route: Transdermal    sodium chloride 0.9% 0.9% flush injection 10 mL     Frequency: PRN     Dose: 10 mL     Route: Intravenous       Lane Ontiveros MD  6/1/2025           [1]   Current Facility-Administered Medications   Medication Dose Route Frequency    sodium chloride 0.9% 0.9% flush injection 10 mL  10 mL Intravenous PRN    haloperidol lactate (Haldol) 5 MG/ML injection 1 mg  1 mg Intravenous Q1H PRN    Or    haloperidol lactate (Haldol) 5 MG/ML injection 2 mg  2 mg Intravenous Q1H PRN    LORazepam (Ativan) 2 mg/mL injection 1 mg  1 mg Intravenous Q4H PRN    Or    LORazepam (Ativan) 2 mg/mL injection 2 mg  2 mg Intravenous Q4H PRN    scopolamine (Transderm-Scop) 1 MG/3DAYS patch 1 patch  1 patch Transdermal Q72H    atropine (Atropine-Care) 1 % ophthalmic solution 2 drop  2 drop Oral Q2H PRN    glycopyrrolate (Robinul) 0.2 MG/ML injection 0.4 mg  0.4 mg Intravenous Q3H PRN    furosemide (Lasix) 10 mg/mL injection 40 mg  40 mg Intravenous Q8H PRN    ondansetron (Zofran) 4 MG/2ML injection 4 mg  4 mg Intravenous Q6H PRN    glycerin-hypromellose- (Artificial Tears) 0.2-0.2-1 % ophthalmic solution 2 drop  2 drop Both Eyes Q1H PRN    HYDROmorphone in sodium chloride 0.9% (Dilaudid) 20 mg/100mL infusion premix  0.2-4 mg/hr Intravenous Continuous    HYDROmorphone (Dilaudid) 1 MG/ML injection 0.5 mg  0.5 mg Intravenous Q2H PRN    Or    HYDROmorphone (Dilaudid) 1 MG/ML injection 1 mg  1 mg Intravenous Q2H PRN    Or    HYDROmorphone (Dilaudid) 1 MG/ML injection 2 mg  2 mg Intravenous Q2H PRN    acetaminophen (Ofirmev) 10 mg/mL infusion premix 600 mg  15 mg/kg Intravenous Q6H PRN   [2]   Patient Active Problem List  Diagnosis    Squamous cell cancer, anus (HCC)    Iron deficiency anemia due to chronic blood loss    Rectal bleeding    Cancer associated pain    Hyponatremia    Palliative care by specialist

## 2025-06-09 NOTE — HOSPICE RN NOTE
[facility] Inpatient Nursing Rounds: Pt visited at bedside, caregiver present. Pt awake, alert, appears oriented. Asking about food options, wanting to eat. Does admit to \"terrible\" pain in his rectum. Laying on his side, unable to lay flat due to pain from tumor/wound. Pt is agreeable to increase in Dilaudid drip from 0.3mg/hr to 0.4mg/hr but would like to do so after eating. Pt also complaining of itching, side effects of opioids reviewed with patient and offered antihistamine for relief. Pt again agreeable, but preferring to wait until after eating. Spoke to Dr. Ontiveros. Orders placed for IV and PO benadryl, fentanyl patch, and PO Liquid dilaudid prn for trial. If pain can be managed, will need to transfer pt to routine level of care hospice. Spoke with INES Moreno. Tiffanie states pt would prefer to go home and would need 24hrs caregivers. Tiffanie states family not agreeable to pay for caregivers. Tiffanie shares that pt has medicaid. Discussed pt discharge planning options would include transfer to LTC facility with room and board being covered by Medicaid. Tiffanie stated he would not like that options but may be only option. RH discussed med changes and if symptom management achieved in next 1-2 days, we will look for discharge plan.    24hr med use:  Dilaudid drip at 0.3mg/hr.  IVP Dilaudid 0.5mg x's 2  IVP Dilaudid 1mg x's 1      PPS: 30    [facility] to continue to offer services and provide support to patient and family as needed. POC discussed with Juju BANDA.     Leslie Fox RN, BSN  [facility] Nurse Liaison  Office: [phone] or After Hours: [phone]

## 2025-06-09 NOTE — PLAN OF CARE
Joseph can be forgetful. Appears comfortable with family at bedside. Incontinence care PRN. Pain managed with Dilaudid gtt for most of day. Fentanyl patch added. Oral medications added for anticipation of discharge to facility although patient, \"not ready to try\" Call light in reach. Safety precautions maintained.     Problem: COPING  Goal: Pt/Family able to verbalize concerns and demonstrate effective coping strategies  Description: INTERVENTIONS:  - Assist patient/family to identify coping skills, available support systems and cultural and spiritual values  - Provide emotional support, including active listening and acknowledgement of concerns of patient and caregivers  - Reduce environmental stimuli, as able  - Instruct patient/family in relaxation techniques, as appropriate  - Assess for spiritual and psychosocial needs and initiate Spiritual Care or Behavioral Health consult as needed  Outcome: Progressing     Problem: DEATH & DYING  Goal: Pt/Family communicate acceptance of impending death and feel psychological comfort and peace  Description: INTERVENTIONS:  - Assess patient/family anxiety and grief process related to end of life issues  - Provide emotional and spiritual support  - Provide information about the patient’s health status with consideration of family and cultural values  - Communicate willingness to discuss death and facilitate grief process  with patient/family as appropriate  - Emphasize sustaining relationships within family system and community, or anh/spiritual traditions  - Initiate Spiritual Care consult as needed  Outcome: Progressing     Problem: CHANGE IN BODY IMAGE  Goal: Pt/Family communicate acceptance of loss or change in body image and feel psychological comfort and peace  Description: INTERVENTIONS:  - Assess patient/family anxiety and grief process related to change in body image, loss of functional status, loss of sense of self, and forgiveness  - Provide emotional and spiritual  support  - Provide information about the patient’s health status with consideration of family and cultural values  - Communicate willingness to discuss loss and facilitate grief process with patient/family as appropriate  - Emphasize sustaining relationships within family system and community, or anh/spiritual traditions  - Initiate Spiritual Care consult as needed  Outcome: Progressing     Problem: DECISION MAKING  Goal: Pt/Family able to effectively weigh alternatives and participate in decision making related to treatment and care  Description: INTERVENTIONS:  - Determine when there are differences between patient's view, family's view, and healthcare provider's view of condition  - Facilitate patient and family articulation of goals for care  - Help patient and family identify pros/cons of alternative solutions  - Provide information as requested by patient/family  - Respect patient/family right to receive or not to receive information  - Serve as a liaison between patient and family and health care team  - Initiate Consults from Ethics, Palliative Care or initiate Family Care Conference as is appropriate  Outcome: Progressing     Problem: CONFUSION  Goal: Confusion, delirium, dementia or psychosis is improved or at baseline  Description: INTERVENTIONS:  - Assess for possible contributors to thought disturbance, including medications, impaired vision or hearing, underlying metabolic abnormalities, dehydration, psychiatric diagnoses, and notify attending MD/LIP  - [place] high risk fall precautions, as indicated  - Provide frequent short contacts to provide reality reorientation, refocusing and direction  - Decrease environmental stimuli, including noise as appropriate  - Monitor and intervene to maintain adequate nutrition, hydration, elimination, sleep and activity  - Consider the need for a sitter if unable to leave patient unattended  - Initiate Spiritual Care consult as indicated  - Consult Pharmacy as  needed  Outcome: Progressing

## 2025-06-09 NOTE — PAYOR COMM NOTE
--------------  DISCHARGE REVIEW    Payor: [facility]  Subscriber #:  [id number]  Authorization Number: [id number]    Admit date: 25  Admit time:  10:59 PM  Discharge Date: 2025  3:54 PM     Admitting Physician: Lane Ontiveros MD  Attending Physician:  No att. providers found  Primary Care Physician: Pcp, None          Discharge Summary Notes        Discharge Summary signed by Lane Ontiveros MD at 2025  1:23 PM       Author: Lane Ontiveros MD Specialty: Internal Medicine Author Type: Physician    Filed: 2025  1:23 PM Date of Service: 2025  1:19 PM Status: Signed    : Lane Ontiveros MD (Physician)         [facility]  part of [facility]    Discharge Summary    Joseph Angel Patient Status:  Inpatient    1980 MRN [id number]   Location [facility] 4W//SE Attending Lane Ontiveros MD   Hosp Day # 7 PCP None Pcp                Date of Admission: 2025   Date of Discharge: 2025    Admitting Diagnosis: Hyponatremia [E87.1]  Rectal bleeding [K62.5]  Cancer associated pain [G89.3]  Squamous cell cancer, anus (HCC) [C21.0]    Disposition: hospice     Discharge Diagnosis: .Principal Problem:    Squamous cell cancer, anus (HCC)  Active Problems:    Rectal bleeding    Cancer associated pain    Hyponatremia    Palliative care by specialist  Acute blood loss anemia    Hospital Course:   Reason for Admission: Rectal bleeding    Discharge Physical Exam:  Vital Signs:  Blood pressure 92/57, pulse 88, temperature 97.3 °F (36.3 °C), temperature source Axillary, resp. rate 16, height 5' 9\" (1.753 m), weight 87 lb (39.5 kg), SpO2 99%.     General: No acute distress. Alert  HEENT: Moist mucous membranes. EOM-I. PERRL  Neck: No lymphadenopathy.  No JVD. No carotid bruits.  Respiratory: Clear to auscultation bilaterally.  No wheezes. No rhonchi.  Cardiovascular: S1, S2.  Regular rate and rhythm.  No murmurs. Equal pulses    Abdomen: Soft, nontender, nondistended.  Positive bowel sounds. No rebound tenderness  Neurologic: No focal neurological deficits.    Integument: No lesions. No erythema.  Psychiatric: Alert    Hospital Course: Joseph Angel is a(n) 45 year old male.  With squamous cell anal cancer, he has a known history of anal cancer dating back to 2022.  Patient has refused the treatment on multiple occasion patient came to the emergency room because of the increased pain and also patient was having some bleeding.  Patient wants to be DNR comfort care.  Patient tells me that he has lost a lot of weight and he is at the end.  He does not want a chemotherapy or radiation.  He also does not want any surgery because patient tells me that he had seen Dr. Beverly in the past and he did not like what he has to say he does not want any colostomy.  Previous oncology notes were reviewed    Patient was admitted to hospital.  Patient was started on IV pain medications patient was seen by palliative care.  Patient was made comfort care.  Patient also seen by colorectal surgeon in the did not recommend any surgery for him as the tumor is unresectable now.  Oncology service also does not have any treatment offered to him at this time.  Patient also not in any condition to get any systemic treatment.  Patient was also given blood transfusion for acute blood loss anemia.  Patient agreed for hospice care.  And today the patient will be transferred to hospice care he does not want to go to inpatient hospice unit.  I called and spoke to his POANDRAE Tiffanie.    Complications:     Consultants         Provider   Role Specialty     George Carmona MD      Consulting Physician Surgical Oncology     Mann Scott MD      Consulting Physician SURGERY, GENERAL     Deyvi Conn DO      Consulting Physician Hematology and Oncology     Zee Gregory MD      Consulting Physician Hematology and Oncology            Pending Labs       Order Current  Status    Blood Culture Preliminary result            Discharge Plan:   Discharge Condition: Serious    Current Discharge Medication List        New Orders    Details   hydrOXYzine HCl 10 MG/5ML Oral Syrup Take 5 mL (10 mg total) by mouth every 6 (six) hours as needed for Itching.      lactulose 10 GM/15ML Oral Solution Take 15 mL (10 g total) by mouth 3 (three) times daily as needed.      ondansetron 8 MG Oral Tablet Dispersible Take 1 tablet (8 mg total) by mouth every 6 (six) hours as needed.           Home Meds - Unchanged    Details   acetaminophen 160 MG/5ML Oral Solution Take 500-1,000 mg by mouth every 6 (six) hours as needed.                 Discharge Diet: As tolerated and General diet    Discharge Activity: As tolerated    Follow up:       Follow up Labs:        Lane Ontiveros MD   6/7/2025    I spent 35 minutes in discharge planning for this patient, including extensive counseling regarding the patient's condition, recommendations regarding follow-up care, discussing with any applicable consultants, and arranging follow-up as indicated.     Electronically signed by Lane Ontiveros MD on 6/7/2025  1:23 PM         REVIEWER COMMENTS

## 2025-06-10 NOTE — PROGRESS NOTES
[facility]  part of [facility]    Progress Note    Joseph Angel Patient Status:  Inpatient    1980 MRN [id number]   Location [facility] 4W/SW/SE Attending Lane Ontiveros MD   Hosp Day # 2 PCP None Pcp       SUBJECTIVE:  Patient is alert resting comfortably        OBJECTIVE:  Vital signs in last 24 hours:  BP (!) 72/51 (BP Location: Right arm)   Pulse (!) 130   Temp 97.8 °F (36.6 °C) (Oral)   Resp 20   Ht 5' 9.02\" (1.753 m)   Wt 87 lb 1.3 oz (39.5 kg)   SpO2 96%   BMI 12.85 kg/m²     Intake/Output:    Intake/Output Summary (Last 24 hours) at 6/10/2025 0912  Last data filed at 6/10/2025 0737  Gross per 24 hour   Intake 163.25 ml   Output --   Net 163.25 ml       Wt Readings from Last 3 Encounters:   25 87 lb 1.3 oz (39.5 kg)   25 87 lb (39.5 kg)   25 88 lb 2.9 oz (40 kg)       Exam  Gen: No acute distress, alert   HEENT: Pallor noted  Pulm: Lungs clear, normal respiratory effort  CV: Heart with regular rate and rhythm, no peripheral edema  Abd: Abdomen soft, nontender, nondistended, no organomegaly, bowel sounds present  MSK: Full range of motion in extremities, no clubbing, no cyanosis  Skin: no rashes or lesions  CNS: Alert    Data Review:     Labs:        LABS  Recent Labs   Lab 25  1538 25  0839 25  0840   RBC  --   --  3.59*   HGB 8.3*  --  8.9*   HCT 27.0*  --  29.4*   MCV  --   --  81.9   MCH  --   --  24.8*   MCHC  --   --  30.3*   RDW  --   --  17.7*   NEPRELIM  --   --  18.32*   WBC  --   --  20.6*   PLT  --   --  261.0   GLU  --  148*  --        Imaging:      Meds:   Current Hospital Medications[1]    Assessment  Problem List[2]  Squamous cell cancer, anus (HCC)  With metastasis.  Continue pain control.  Continue hospice care.     Severe protein calorie malnutrition.        Anemia of the malignancy.     I discussed with the nursing staff.  Discussed with the hospice nursing staff.       Active Orders   Respiratory Care     Oxygen administration (adult) PRN     Frequency: PRN     Number of Occurrences: Until Specified     Order Comments: Pre-oxygenate with 100% for 30 minutes prior to prone/supine turn     Microbiology    Clostridium difficile(toxigenic)PCR     Frequency: Once     Number of Occurrences: 1 Occurrences   Diet    Regular/General diet Is Patient on Accuchecks? No; Misc Restriction: Pleasure Feed     Frequency: Effective Now     Number of Occurrences: Until Specified   Nursing    Activity as tolerated Until Discontinued     Frequency: Until Discontinued     Number of Occurrences: Until Specified    End of life care, comfort measures     Frequency: Until Discontinued     Number of Occurrences: Until Specified    Oral care     Frequency: PRN     Number of Occurrences: Until Specified    Oral suction     Frequency: PRN     Number of Occurrences: Until Specified     Order Comments: minimize deep suctioning, clear upper airway secretions as appropriate      Place/ Maintain PIV     Frequency: Once     Number of Occurrences: 1 Occurrences    Straight cath     Frequency: PRN     Number of Occurrences: Until Specified    Vital signs     Frequency: Q Shift     Number of Occurrences: Until Specified    Wound care     Frequency: PRN     Number of Occurrences: Until Specified     Order Comments: May use  Transparent, Foam, Hydrocolloid and/or Hydrofiber dressing as needed.     Code Status    DNAR/Comfort care Continuous     Frequency: Continuous     Number of Occurrences: Until Specified     Order Comments: Primary goal of maximizing comfort. Relieve pain & suffering through the use of medication by any route as needed; use oxygen, suctioning & manual treatment of airway obstruction.       Isolation    Enteric/Contact PLUS Isolation Continuous     Frequency: Continuous     Number of Occurrences: Until Specified   Medications    acetaminophen (Ofirmev) 10 mg/mL infusion premix 600 mg     Frequency: Q6H PRN     Dose: 15 mg/kg     Route:  Intravenous    atropine (Atropine-Care) 1 % ophthalmic solution 2 drop     Frequency: Q2H PRN     Dose: 2 drop     Route: Oral    diphenhydrAMINE (Benadryl) 50 mg/mL  injection 25 mg     Frequency: Q4H PRN     Dose: 25 mg     Route: Intravenous    diphenhydrAMINE (Benadryl) cap/tab 25 mg     Frequency: Q4H PRN     Dose: 25 mg     Route: Oral    fentaNYL (Duragesic) 12 MCG/HR patch 1 patch     Frequency: Q72H     Dose: 1 patch     Route: Transdermal    furosemide (Lasix) 10 mg/mL injection 40 mg     Frequency: Q8H PRN     Dose: 40 mg     Route: Intravenous    glycerin-hypromellose- (Artificial Tears) 0.2-0.2-1 % ophthalmic solution 2 drop     Frequency: Q1H PRN     Dose: 2 drop     Route: Both Eyes    glycopyrrolate (Robinul) 0.2 MG/ML injection 0.4 mg     Frequency: Q3H PRN     Dose: 0.4 mg     Route: Intravenous    haloperidol lactate (Haldol) 5 MG/ML injection 1 mg     Linked Order: Or     Frequency: Q1H PRN     Dose: 1 mg     Route: Intravenous    haloperidol lactate (Haldol) 5 MG/ML injection 2 mg     Linked Order: Or     Frequency: Q1H PRN     Dose: 2 mg     Route: Intravenous    HYDROmorphone (Dilaudid) 1 MG/ML injection 0.5 mg     Linked Order: Or     Frequency: Q2H PRN     Dose: 0.5 mg     Route: Intravenous    HYDROmorphone (Dilaudid) 1 MG/ML injection 1 mg     Linked Order: Or     Frequency: Q2H PRN     Dose: 1 mg     Route: Intravenous    HYDROmorphone (Dilaudid) 1 MG/ML injection 2 mg     Linked Order: Or     Frequency: Q2H PRN     Dose: 2 mg     Route: Intravenous    HYDROmorphone HCl (DILAUDID) oral liquid 2 mg     Frequency: Q2H PRN     Dose: 2 mg     Route: Oral    HYDROmorphone in sodium chloride 0.9% (Dilaudid) 20 mg/100mL infusion premix     Frequency: Continuous     Dose: 0.2-4 mg/hr     Route: Intravenous    LORazepam (Ativan) 2 mg/mL injection 1 mg     Linked Order: Or     Frequency: Q4H PRN     Dose: 1 mg     Route: Intravenous    LORazepam (Ativan) 2 mg/mL injection 2 mg     Linked  Order: Or     Frequency: Q4H PRN     Dose: 2 mg     Route: Intravenous    ondansetron (Zofran) 4 MG/2ML injection 4 mg     Frequency: Q6H PRN     Dose: 4 mg     Route: Intravenous    scopolamine (Transderm-Scop) 1 MG/3DAYS patch 1 patch     Frequency: Q72H     Dose: 1 patch     Route: Transdermal    sodium chloride 0.9% 0.9% flush injection 10 mL     Frequency: PRN     Dose: 10 mL     Route: Intravenous       Lane Ontiveros MD  6/10/2025  9:12 AM         [1]   Current Facility-Administered Medications   Medication Dose Route Frequency    HYDROmorphone HCl (DILAUDID) oral liquid 2 mg  2 mg Oral Q2H PRN    fentaNYL (Duragesic) 12 MCG/HR patch 1 patch  1 patch Transdermal Q72H    diphenhydrAMINE (Benadryl) cap/tab 25 mg  25 mg Oral Q4H PRN    diphenhydrAMINE (Benadryl) 50 mg/mL  injection 25 mg  25 mg Intravenous Q4H PRN    sodium chloride 0.9% 0.9% flush injection 10 mL  10 mL Intravenous PRN    haloperidol lactate (Haldol) 5 MG/ML injection 1 mg  1 mg Intravenous Q1H PRN    Or    haloperidol lactate (Haldol) 5 MG/ML injection 2 mg  2 mg Intravenous Q1H PRN    LORazepam (Ativan) 2 mg/mL injection 1 mg  1 mg Intravenous Q4H PRN    Or    LORazepam (Ativan) 2 mg/mL injection 2 mg  2 mg Intravenous Q4H PRN    scopolamine (Transderm-Scop) 1 MG/3DAYS patch 1 patch  1 patch Transdermal Q72H    atropine (Atropine-Care) 1 % ophthalmic solution 2 drop  2 drop Oral Q2H PRN    glycopyrrolate (Robinul) 0.2 MG/ML injection 0.4 mg  0.4 mg Intravenous Q3H PRN    furosemide (Lasix) 10 mg/mL injection 40 mg  40 mg Intravenous Q8H PRN    ondansetron (Zofran) 4 MG/2ML injection 4 mg  4 mg Intravenous Q6H PRN    glycerin-hypromellose- (Artificial Tears) 0.2-0.2-1 % ophthalmic solution 2 drop  2 drop Both Eyes Q1H PRN    HYDROmorphone in sodium chloride 0.9% (Dilaudid) 20 mg/100mL infusion premix  0.2-4 mg/hr Intravenous Continuous    HYDROmorphone (Dilaudid) 1 MG/ML injection 0.5 mg  0.5 mg Intravenous Q2H PRN    Or     HYDROmorphone (Dilaudid) 1 MG/ML injection 1 mg  1 mg Intravenous Q2H PRN    Or    HYDROmorphone (Dilaudid) 1 MG/ML injection 2 mg  2 mg Intravenous Q2H PRN    acetaminophen (Ofirmev) 10 mg/mL infusion premix 600 mg  15 mg/kg Intravenous Q6H PRN   [2]   Patient Active Problem List  Diagnosis    Squamous cell cancer, anus (HCC)    Iron deficiency anemia due to chronic blood loss    Rectal bleeding    Cancer associated pain    Hyponatremia    Palliative care by specialist

## 2025-06-10 NOTE — PLAN OF CARE
Problem: COPING  Goal: Pt/Family able to verbalize concerns and demonstrate effective coping strategies  Description: INTERVENTIONS:  - Assist patient/family to identify coping skills, available support systems and cultural and spiritual values  - Provide emotional support, including active listening and acknowledgement of concerns of patient and caregivers  - Reduce environmental stimuli, as able  - Instruct patient/family in relaxation techniques, as appropriate  - Assess for spiritual and psychosocial needs and initiate Spiritual Care or Behavioral Health consult as needed  Outcome: Progressing     Problem: DEATH & DYING  Goal: Pt/Family communicate acceptance of impending death and feel psychological comfort and peace  Description: INTERVENTIONS:  - Assess patient/family anxiety and grief process related to end of life issues  - Provide emotional and spiritual support  - Provide information about the patient’s health status with consideration of family and cultural values  - Communicate willingness to discuss death and facilitate grief process  with patient/family as appropriate  - Emphasize sustaining relationships within family system and community, or anh/spiritual traditions  - Initiate Spiritual Care consult as needed  Outcome: Progressing     Problem: CHANGE IN BODY IMAGE  Goal: Pt/Family communicate acceptance of loss or change in body image and feel psychological comfort and peace  Description: INTERVENTIONS:  - Assess patient/family anxiety and grief process related to change in body image, loss of functional status, loss of sense of self, and forgiveness  - Provide emotional and spiritual support  - Provide information about the patient’s health status with consideration of family and cultural values  - Communicate willingness to discuss loss and facilitate grief process with patient/family as appropriate  - Emphasize sustaining relationships within family system and community, or anh/spiritual  traditions  - Initiate Spiritual Care consult as needed  Outcome: Progressing     Problem: DECISION MAKING  Goal: Pt/Family able to effectively weigh alternatives and participate in decision making related to treatment and care  Description: INTERVENTIONS:  - Determine when there are differences between patient's view, family's view, and healthcare provider's view of condition  - Facilitate patient and family articulation of goals for care  - Help patient and family identify pros/cons of alternative solutions  - Provide information as requested by patient/family  - Respect patient/family right to receive or not to receive information  - Serve as a liaison between patient and family and health care team  - Initiate Consults from Ethics, Palliative Care or initiate Family Care Conference as is appropriate  Outcome: Progressing     Problem: CONFUSION  Goal: Confusion, delirium, dementia or psychosis is improved or at baseline  Description: INTERVENTIONS:  - Assess for possible contributors to thought disturbance, including medications, impaired vision or hearing, underlying metabolic abnormalities, dehydration, psychiatric diagnoses, and notify attending MD/LIP  - [place] high risk fall precautions, as indicated  - Provide frequent short contacts to provide reality reorientation, refocusing and direction  - Decrease environmental stimuli, including noise as appropriate  - Monitor and intervene to maintain adequate nutrition, hydration, elimination, sleep and activity  - Consider the need for a sitter if unable to leave patient unattended  - Initiate Spiritual Care consult as indicated  - Consult Pharmacy as needed  Outcome: Progressing

## 2025-06-10 NOTE — PALLIATIVE CARE NOTE
Brief Palliative Care Note    I saw Joseph this morning. His sister is bedside. His mother is arriving later this evening ([phone]?) [facility]. He is lethargic, weak, intermittently props himself up. He is now accepting of his Sister's care and presence.    Sister requests that a nurse or perhaps  or counselor or ? Be present or available to help console her mother in case of severe emotional distress. (States that Sister and Mother had no idea he was this sick because he was not allowing them to be in contact and that she is '74 and not in the best health herself')    His blood pressures are low, and I advised her that he may not be conscious for much longer.    Cristiano Jauregui MD  Palliative Care Services  [facility] [phone]

## 2025-06-10 NOTE — PLAN OF CARE
Joseph is lethargic, mostly nonsensical speech/ hard to understand. Refused vitals this shift. Repositioned for comfort and with bed changes, he is able to shift his weight in bed. Dilaudid gtt increased to 1.4mg/hr. 1mg IVP given x1 this afternoon. Comfort and safety measures maintained.     Problem: COPING  Goal: Pt/Family able to verbalize concerns and demonstrate effective coping strategies  Description: INTERVENTIONS:  - Assist patient/family to identify coping skills, available support systems and cultural and spiritual values  - Provide emotional support, including active listening and acknowledgement of concerns of patient and caregivers  - Reduce environmental stimuli, as able  - Instruct patient/family in relaxation techniques, as appropriate  - Assess for spiritual and psychosocial needs and initiate Spiritual Care or Behavioral Health consult as needed  Outcome: Progressing     Problem: DEATH & DYING  Goal: Pt/Family communicate acceptance of impending death and feel psychological comfort and peace  Description: INTERVENTIONS:  - Assess patient/family anxiety and grief process related to end of life issues  - Provide emotional and spiritual support  - Provide information about the patient’s health status with consideration of family and cultural values  - Communicate willingness to discuss death and facilitate grief process  with patient/family as appropriate  - Emphasize sustaining relationships within family system and community, or anh/spiritual traditions  - Initiate Spiritual Care consult as needed  Outcome: Progressing     Problem: CHANGE IN BODY IMAGE  Goal: Pt/Family communicate acceptance of loss or change in body image and feel psychological comfort and peace  Description: INTERVENTIONS:  - Assess patient/family anxiety and grief process related to change in body image, loss of functional status, loss of sense of self, and forgiveness  - Provide emotional and spiritual support  - Provide  information about the patient’s health status with consideration of family and cultural values  - Communicate willingness to discuss loss and facilitate grief process with patient/family as appropriate  - Emphasize sustaining relationships within family system and community, or anh/spiritual traditions  - Initiate Spiritual Care consult as needed  Outcome: Progressing     Problem: DECISION MAKING  Goal: Pt/Family able to effectively weigh alternatives and participate in decision making related to treatment and care  Description: INTERVENTIONS:  - Determine when there are differences between patient's view, family's view, and healthcare provider's view of condition  - Facilitate patient and family articulation of goals for care  - Help patient and family identify pros/cons of alternative solutions  - Provide information as requested by patient/family  - Respect patient/family right to receive or not to receive information  - Serve as a liaison between patient and family and health care team  - Initiate Consults from Ethics, Palliative Care or initiate Family Care Conference as is appropriate  Outcome: Progressing     Problem: CONFUSION  Goal: Confusion, delirium, dementia or psychosis is improved or at baseline  Description: INTERVENTIONS:  - Assess for possible contributors to thought disturbance, including medications, impaired vision or hearing, underlying metabolic abnormalities, dehydration, psychiatric diagnoses, and notify attending MD/LIP  - [place] high risk fall precautions, as indicated  - Provide frequent short contacts to provide reality reorientation, refocusing and direction  - Decrease environmental stimuli, including noise as appropriate  - Monitor and intervene to maintain adequate nutrition, hydration, elimination, sleep and activity  - Consider the need for a sitter if unable to leave patient unattended  - Initiate Spiritual Care consult as indicated  - Consult Pharmacy as needed  Outcome:  Progressing

## 2025-06-10 NOTE — HOSPICE RN NOTE
[facility] Nursing Rounds:     Hospice RN visited patient and friends at bedside. Patient lethargic but able to answer verbal questions. Reports pain as 5/10 to chest and sacrum. Restless. Reports nausea as \"bad.\" Breathing labored, shallow, irregular, RR 20, diminished in all fields, on room air. Incontinent x2. LBM: 6/8/25. Bowel sounds hypoactive in all quadrants.     Reviewed symptom management over the past 24 hours: Fentanyl patch 12 mcg/hr for pain, hydromorphone infusion increased to 1 mg/hr for pain. PRN medications given include: hydromorphone 1 mg IVP x1 for pain, hydromorphone 2 mg IVP x1 for pain, ondansetron 4 mg IVP x2 for nausea.     PPS: 20%    Patient remains eligible for general inpatient hospice care for symptom management of pain and nausea requiring frequent nursing assessments and interventions including the administration and titration of IV medications per Dr. Ontiveros and Dr. Bull. Discharge planning continued. Plan to go to [facility]'s home with CADD pump once pain baseline established. POC discussed with the patient and Kristina Voss RN at bedside. POC discussed with Tiffanie CHACON, via telephone. All in agreement. [facility] will continue to support the patient and family.    1652: Hydromorphone infusion increased to 1.4 mg/hr for pain.    Joe Elkins, ELIFN, RN, [facility]  [facility] RN Liaison  [phone] (After-hours)

## 2025-06-10 NOTE — SPIRITUAL CARE NOTE
Spiritual Care Visit Note    Patient Name: Joseph Angel Date of Spiritual Care Visit: 06/10/25   : 1980 Primary Dx: <principal problem not specified>       Referred By: Referral From: Hospice,     Spiritual Care Taxonomy:    Intended Effects: Aligning care plan with patient's values    Methods: Collaborate with care team member, Offer support    Interventions: Assist someone with Advance Directives, Silent prayer    Visit Type/Summary:     - PoA: Identified Existing PoAH - No Updates Needed: Writer confirmed copy of HPoA on chart and recorded the primary agent is Tiffanie More ([phone]) and 1st successor is Edi Rodriguez ([phone]) and 2nd successor is Josh Rodrigues ([phone]) per the copy provided.  remains available for follow up.     MRAIFER Oleary, CAMII, [facility]   [id number]     Spiritual Care support can be requested via an Epic consult. For urgent/immediate needs, please contact the On Call  at: Kourtney: ext [phone]

## 2025-06-10 NOTE — PALLIATIVE CARE NOTE
Brief Palliative Care Note    Patient's half-sister called me again. She is asking about autopsy and for more details about the extent of Joseph's cancer, how long he's had it, etc. She says she wants to be alerted when he dies before his body is moved, so she knows 'where its going' and so that her mother who is arriving from [facility] 'can have a voice in this.'    I spoke to the HC-POA Tiffanie More who says that Joseph REPEATEDLY asked them NOT to be involved in his health care decisions or the disposition of his remains. Among many other sources of conflict, his mother had legal proceedings against him, specifically to have his ownership of the $4M warehouse revoked and deed turned over to her. Tiffanie helped secure legal representation for him during that time.     I recommend ONLY providing medical information to the HC-POA as best as possible. Tiffanie says she is currently allowing the sister to be there, but has a low threshhold to restrict her if there continues to be invasion of his privacy. She has told me that he has formally stated that he wants cremation and does NOT want autopsy.     Cristiano Jauregui MD  Palliative Care Services  [facility] [phone]

## 2025-06-11 NOTE — PLAN OF CARE
Pt opens eyes but does not respond to questions. Pt was placed on 2L NC per family request. Dilaudid gtt at 1.4 mg/hr. X1 dose of robinul. Repositioned and cleaned as needed. Family at bedside.    Problem: COPING  Goal: Pt/Family able to verbalize concerns and demonstrate effective coping strategies  Description: INTERVENTIONS:  - Assist patient/family to identify coping skills, available support systems and cultural and spiritual values  - Provide emotional support, including active listening and acknowledgement of concerns of patient and caregivers  - Reduce environmental stimuli, as able  - Instruct patient/family in relaxation techniques, as appropriate  - Assess for spiritual and psychosocial needs and initiate Spiritual Care or Behavioral Health consult as needed  Outcome: Progressing     Problem: DEATH & DYING  Goal: Pt/Family communicate acceptance of impending death and feel psychological comfort and peace  Description: INTERVENTIONS:  - Assess patient/family anxiety and grief process related to end of life issues  - Provide emotional and spiritual support  - Provide information about the patient’s health status with consideration of family and cultural values  - Communicate willingness to discuss death and facilitate grief process  with patient/family as appropriate  - Emphasize sustaining relationships within family system and community, or anh/spiritual traditions  - Initiate Spiritual Care consult as needed  Outcome: Progressing     Problem: CHANGE IN BODY IMAGE  Goal: Pt/Family communicate acceptance of loss or change in body image and feel psychological comfort and peace  Description: INTERVENTIONS:  - Assess patient/family anxiety and grief process related to change in body image, loss of functional status, loss of sense of self, and forgiveness  - Provide emotional and spiritual support  - Provide information about the patient’s health status with consideration of family and cultural values  -  Communicate willingness to discuss loss and facilitate grief process with patient/family as appropriate  - Emphasize sustaining relationships within family system and community, or anh/spiritual traditions  - Initiate Spiritual Care consult as needed  Outcome: Progressing     Problem: DECISION MAKING  Goal: Pt/Family able to effectively weigh alternatives and participate in decision making related to treatment and care  Description: INTERVENTIONS:  - Determine when there are differences between patient's view, family's view, and healthcare provider's view of condition  - Facilitate patient and family articulation of goals for care  - Help patient and family identify pros/cons of alternative solutions  - Provide information as requested by patient/family  - Respect patient/family right to receive or not to receive information  - Serve as a liaison between patient and family and health care team  - Initiate Consults from Ethics, Palliative Care or initiate Family Care Conference as is appropriate  Outcome: Progressing     Problem: CONFUSION  Goal: Confusion, delirium, dementia or psychosis is improved or at baseline  Description: INTERVENTIONS:  - Assess for possible contributors to thought disturbance, including medications, impaired vision or hearing, underlying metabolic abnormalities, dehydration, psychiatric diagnoses, and notify attending MD/LIP  - [place] high risk fall precautions, as indicated  - Provide frequent short contacts to provide reality reorientation, refocusing and direction  - Decrease environmental stimuli, including noise as appropriate  - Monitor and intervene to maintain adequate nutrition, hydration, elimination, sleep and activity  - Consider the need for a sitter if unable to leave patient unattended  - Initiate Spiritual Care consult as indicated  - Consult Pharmacy as needed  Outcome: Progressing

## 2025-06-11 NOTE — PROGRESS NOTES
[facility]  part of [facility]    Progress Note    Joseph Angel Patient Status:  Inpatient    1980 MRN [id number]   Location [facility] 4W/SW/SE Attending Lane Ontiveros MD   Hosp Day # 3 PCP None Pcp       SUBJECTIVE:  Patient is asleep.  Patient's friend  is at the bedside        OBJECTIVE:  Vital signs in last 24 hours:  BP (!) 63/37 (BP Location: Right arm)   Pulse (!) 142   Temp (!) 100.6 °F (38.1 °C) (Axillary)   Resp 12   Ht 5' 9.02\" (1.753 m)   Wt 87 lb 1.3 oz (39.5 kg)   SpO2 92%   BMI 12.85 kg/m²     Intake/Output:    Intake/Output Summary (Last 24 hours) at 2025 1353  Last data filed at 2025 0722  Gross per 24 hour   Intake 141.7 ml   Output --   Net 141.7 ml       Wt Readings from Last 3 Encounters:   25 87 lb 1.3 oz (39.5 kg)   25 87 lb (39.5 kg)   25 88 lb 2.9 oz (40 kg)       Exam  Gen: No acute distress,   HEENT: Pallor noted  Pulm: Lungs clear, normal respiratory effort  CV: Heart with regular rate and rhythm, no peripheral edema  Abd: Abdomen soft, nontender, nondistended, no organomegaly, bowel sounds present  MSK: Full range of motion in extremities, no clubbing, no cyanosis  Skin: no rashes or lesions      Data Review:     Labs:        LABS  Recent Labs   Lab 25  0839 25  0840   RBC  --  3.59*   HGB  --  8.9*   HCT  --  29.4*   MCV  --  81.9   MCH  --  24.8*   MCHC  --  30.3*   RDW  --  17.7*   NEPRELIM  --  18.32*   WBC  --  20.6*   PLT  --  261.0   *  --        Imaging:      Meds:   Current Hospital Medications[1]    Assessment  Problem List[2]  Squamous cell cancer, anus (HCC)  With metastasis.  Continue pain control.  Continue hospice care.     Severe protein calorie malnutrition.        Anemia of the malignancy.     discussed with the nursing staff.  Discussed with the hospice nursing staff.       Active Orders   Respiratory Care    Oxygen administration (adult) PRN     Frequency: PRN      Number of Occurrences: Until Specified     Order Comments: Pre-oxygenate with 100% for 30 minutes prior to prone/supine turn     Diet    Regular/General diet Is Patient on Accuchecks? No; Misc Restriction: Pleasure Feed     Frequency: Effective Now     Number of Occurrences: Until Specified   Nursing    Activity as tolerated Until Discontinued     Frequency: Until Discontinued     Number of Occurrences: Until Specified    End of life care, comfort measures     Frequency: Until Discontinued     Number of Occurrences: Until Specified    Oral care     Frequency: PRN     Number of Occurrences: Until Specified    Oral suction     Frequency: PRN     Number of Occurrences: Until Specified     Order Comments: minimize deep suctioning, clear upper airway secretions as appropriate      Place/ Maintain PIV     Frequency: Once     Number of Occurrences: 1 Occurrences    Straight cath     Frequency: PRN     Number of Occurrences: Until Specified    Vital signs     Frequency: Q Shift     Number of Occurrences: Until Specified    Wound care     Frequency: PRN     Number of Occurrences: Until Specified     Order Comments: May use  Transparent, Foam, Hydrocolloid and/or Hydrofiber dressing as needed.     Code Status    DNAR/Comfort care Continuous     Frequency: Continuous     Number of Occurrences: Until Specified     Order Comments: Primary goal of maximizing comfort. Relieve pain & suffering through the use of medication by any route as needed; use oxygen, suctioning & manual treatment of airway obstruction.       Isolation    Enteric/Contact PLUS Isolation Continuous     Frequency: Continuous     Number of Occurrences: Until Specified   Medications    acetaminophen (Ofirmev) 10 mg/mL infusion premix 600 mg     Frequency: Q6H PRN     Dose: 15 mg/kg     Route: Intravenous    atropine (Atropine-Care) 1 % ophthalmic solution 2 drop     Frequency: Q2H PRN     Dose: 2 drop     Route: Oral    diphenhydrAMINE (Benadryl) 50 mg/mL   injection 25 mg     Frequency: Q4H PRN     Dose: 25 mg     Route: Intravenous    diphenhydrAMINE (Benadryl) cap/tab 25 mg     Frequency: Q4H PRN     Dose: 25 mg     Route: Oral    fentaNYL (Duragesic) 12 MCG/HR patch 1 patch     Frequency: Q72H     Dose: 1 patch     Route: Transdermal    furosemide (Lasix) 10 mg/mL injection 40 mg     Frequency: Q8H PRN     Dose: 40 mg     Route: Intravenous    glycerin-hypromellose- (Artificial Tears) 0.2-0.2-1 % ophthalmic solution 2 drop     Frequency: Q1H PRN     Dose: 2 drop     Route: Both Eyes    glycopyrrolate (Robinul) 0.2 MG/ML injection 0.4 mg     Frequency: Q3H PRN     Dose: 0.4 mg     Route: Intravenous    haloperidol lactate (Haldol) 5 MG/ML injection 1 mg     Linked Order: Or     Frequency: Q1H PRN     Dose: 1 mg     Route: Intravenous    haloperidol lactate (Haldol) 5 MG/ML injection 2 mg     Linked Order: Or     Frequency: Q1H PRN     Dose: 2 mg     Route: Intravenous    HYDROmorphone (Dilaudid) 1 MG/ML injection 0.5 mg     Linked Order: Or     Frequency: Q2H PRN     Dose: 0.5 mg     Route: Intravenous    HYDROmorphone (Dilaudid) 1 MG/ML injection 1 mg     Linked Order: Or     Frequency: Q2H PRN     Dose: 1 mg     Route: Intravenous    HYDROmorphone (Dilaudid) 1 MG/ML injection 2 mg     Linked Order: Or     Frequency: Q2H PRN     Dose: 2 mg     Route: Intravenous    HYDROmorphone HCl (DILAUDID) oral liquid 2 mg     Frequency: Q2H PRN     Dose: 2 mg     Route: Oral    HYDROmorphone in sodium chloride 0.9% (Dilaudid) 20 mg/100mL infusion premix     Frequency: Continuous     Dose: 0.2-4 mg/hr     Route: Intravenous    LORazepam (Ativan) 2 mg/mL injection 1 mg     Linked Order: Or     Frequency: Q4H PRN     Dose: 1 mg     Route: Intravenous    LORazepam (Ativan) 2 mg/mL injection 2 mg     Linked Order: Or     Frequency: Q4H PRN     Dose: 2 mg     Route: Intravenous    ondansetron (Zofran) 4 MG/2ML injection 4 mg     Frequency: Q6H PRN     Dose: 4 mg     Route:  Intravenous    scopolamine (Transderm-Scop) 1 MG/3DAYS patch 1 patch     Frequency: Q72H     Dose: 1 patch     Route: Transdermal    sodium chloride 0.9% 0.9% flush injection 10 mL     Frequency: PRN     Dose: 10 mL     Route: Intravenous       Lane Ontiveros MD  6/10/2025  9:12 AM         [1]   Current Facility-Administered Medications   Medication Dose Route Frequency    acetaminophen (Ofirmev) 10 mg/mL infusion premix 600 mg  15 mg/kg Intravenous Q6H PRN    HYDROmorphone HCl (DILAUDID) oral liquid 2 mg  2 mg Oral Q2H PRN    fentaNYL (Duragesic) 12 MCG/HR patch 1 patch  1 patch Transdermal Q72H    diphenhydrAMINE (Benadryl) cap/tab 25 mg  25 mg Oral Q4H PRN    diphenhydrAMINE (Benadryl) 50 mg/mL  injection 25 mg  25 mg Intravenous Q4H PRN    sodium chloride 0.9% 0.9% flush injection 10 mL  10 mL Intravenous PRN    haloperidol lactate (Haldol) 5 MG/ML injection 1 mg  1 mg Intravenous Q1H PRN    Or    haloperidol lactate (Haldol) 5 MG/ML injection 2 mg  2 mg Intravenous Q1H PRN    LORazepam (Ativan) 2 mg/mL injection 1 mg  1 mg Intravenous Q4H PRN    Or    LORazepam (Ativan) 2 mg/mL injection 2 mg  2 mg Intravenous Q4H PRN    scopolamine (Transderm-Scop) 1 MG/3DAYS patch 1 patch  1 patch Transdermal Q72H    atropine (Atropine-Care) 1 % ophthalmic solution 2 drop  2 drop Oral Q2H PRN    glycopyrrolate (Robinul) 0.2 MG/ML injection 0.4 mg  0.4 mg Intravenous Q3H PRN    furosemide (Lasix) 10 mg/mL injection 40 mg  40 mg Intravenous Q8H PRN    ondansetron (Zofran) 4 MG/2ML injection 4 mg  4 mg Intravenous Q6H PRN    glycerin-hypromellose- (Artificial Tears) 0.2-0.2-1 % ophthalmic solution 2 drop  2 drop Both Eyes Q1H PRN    HYDROmorphone in sodium chloride 0.9% (Dilaudid) 20 mg/100mL infusion premix  0.2-4 mg/hr Intravenous Continuous    HYDROmorphone (Dilaudid) 1 MG/ML injection 0.5 mg  0.5 mg Intravenous Q2H PRN    Or    HYDROmorphone (Dilaudid) 1 MG/ML injection 1 mg  1 mg Intravenous Q2H PRN    Or     HYDROmorphone (Dilaudid) 1 MG/ML injection 2 mg  2 mg Intravenous Q2H PRN   [2]   Patient Active Problem List  Diagnosis    Squamous cell cancer, anus (HCC)    Iron deficiency anemia due to chronic blood loss    Rectal bleeding    Cancer associated pain    Hyponatremia    Palliative care by specialist

## 2025-06-11 NOTE — HOSPICE RN NOTE
[facility] RN and Liaison met with patients sister and Mother in room after receiving call from SOLO Matute about family dynamic and information sharing, this RN told Juju BANDA I would come to room to speak to family and reinforce that no information is allowed to be directly shared with them by request of INES Moreno.     Patient family informed that all clinical updates would need to be relayed to them from the INES Moreno. Had a 20 minute conversation with family, provided active listening to the mothers wants in this situation. She is expressing uncertainty about what her sons wishes are, she understands that he decided to make Tiffanie healthcare POA and that she knows his wishes but the mother would like to know the wishes as well to know what the goal of the POA is in terms of patient care and post mortem wishes. This RN called INES Moreno to give update from conversation with the family and to relay the request of the mother. INES Moreno states she has given this information to the family on multiple occasions and she will be in the hospital later today and if family is here she will speak with them at that time.     Marco A Matute RN BSN  [facility]  Transitional Nurse Liaison  [phone] / [phone] (after hours)

## 2025-06-11 NOTE — HOSPICE RN NOTE
[facility] In[facility] Nursing Rounds:     Hospice RN at bedside to visit patient. Patient's mother and sister present during assessment. Patient is now obtunded and minimally responsive. Open mouth breathing on 2LNC. RR 12 and slightly labored. Lungs sound diminished. Skin is pale and cool, no mottling noted. Febrile earlier, 100.6. Extremely cachectic, thin and frail. Abdomen flat, non-tender with hypoactive bowel sounds. Per hospital RN Epic documentation, patient has a fragile, red wound on rectum draining scant to moderate foul-smelling, brown drainage. Wound care per hospital RN. LBM: 6/9/25. Patient appears imminent.     Reviewed symptom management over the past 24 hours: Dilaudid drip remains at 1.4 mg/hr. Orifrmev IV x1 for fever and Robinul IVP x1 for secretions.     PPS: 10%    Patient remains eligible for general inpatient hospice care for symptom management of uncontrolled pain and EOL care. Patient requires frequent nursing assessments to monitor and manage symptoms. Plan is POA's home once symptoms are better controlled. POC discussed with INES Moreno via phone and Juju BANDA. All in agreement. [facility] will continue to support the patient and family.    Pippa ASENCION, RN CHPN  Transitional Nurse Liaison  [facility]  [phone] (After-hours)

## 2025-06-11 NOTE — PLAN OF CARE
Inpatient Hospice. Dilaudid gtt @1.4mg/hr. Minimally responsive today. Incontinence care provided PRN. Family at bedside throughout shift.    Problem: COPING  Goal: Pt/Family able to verbalize concerns and demonstrate effective coping strategies  Description: INTERVENTIONS:  - Assist patient/family to identify coping skills, available support systems and cultural and spiritual values  - Provide emotional support, including active listening and acknowledgement of concerns of patient and caregivers  - Reduce environmental stimuli, as able  - Instruct patient/family in relaxation techniques, as appropriate  - Assess for spiritual and psychosocial needs and initiate Spiritual Care or Behavioral Health consult as needed  Outcome: Progressing     Problem: DEATH & DYING  Goal: Pt/Family communicate acceptance of impending death and feel psychological comfort and peace  Description: INTERVENTIONS:  - Assess patient/family anxiety and grief process related to end of life issues  - Provide emotional and spiritual support  - Provide information about the patient’s health status with consideration of family and cultural values  - Communicate willingness to discuss death and facilitate grief process  with patient/family as appropriate  - Emphasize sustaining relationships within family system and community, or anh/spiritual traditions  - Initiate Spiritual Care consult as needed  Outcome: Progressing     Problem: CHANGE IN BODY IMAGE  Goal: Pt/Family communicate acceptance of loss or change in body image and feel psychological comfort and peace  Description: INTERVENTIONS:  - Assess patient/family anxiety and grief process related to change in body image, loss of functional status, loss of sense of self, and forgiveness  - Provide emotional and spiritual support  - Provide information about the patient’s health status with consideration of family and cultural values  - Communicate willingness to discuss loss and facilitate  grief process with patient/family as appropriate  - Emphasize sustaining relationships within family system and community, or anh/spiritual traditions  - Initiate Spiritual Care consult as needed  Outcome: Progressing     Problem: DECISION MAKING  Goal: Pt/Family able to effectively weigh alternatives and participate in decision making related to treatment and care  Description: INTERVENTIONS:  - Determine when there are differences between patient's view, family's view, and healthcare provider's view of condition  - Facilitate patient and family articulation of goals for care  - Help patient and family identify pros/cons of alternative solutions  - Provide information as requested by patient/family  - Respect patient/family right to receive or not to receive information  - Serve as a liaison between patient and family and health care team  - Initiate Consults from Ethics, Palliative Care or initiate Family Care Conference as is appropriate  Outcome: Progressing     Problem: CONFUSION  Goal: Confusion, delirium, dementia or psychosis is improved or at baseline  Description: INTERVENTIONS:  - Assess for possible contributors to thought disturbance, including medications, impaired vision or hearing, underlying metabolic abnormalities, dehydration, psychiatric diagnoses, and notify attending MD/LIP  - [place] high risk fall precautions, as indicated  - Provide frequent short contacts to provide reality reorientation, refocusing and direction  - Decrease environmental stimuli, including noise as appropriate  - Monitor and intervene to maintain adequate nutrition, hydration, elimination, sleep and activity  - Consider the need for a sitter if unable to leave patient unattended  - Initiate Spiritual Care consult as indicated  - Consult Pharmacy as needed  Outcome: Progressing

## 2025-06-12 NOTE — SPIRITUAL CARE NOTE
Spiritual Care Visit Note    Patient Name: Joseph Angel Date of Spiritual Care Visit: 25   : 1980 Primary Dx: <principal problem not specified>       Referred By: Referral From: Nurse    Spiritual Care Taxonomy:    Intended Effects: Aligning care plan with patient's values    Methods: Collaborate with care team member, Demonstrate acceptance, Offer spiritual/Anabaptism support    Interventions: Acknowledge current situation, Provide hospitality, Share words of hope and inspiration    Visit Type/Summary:     - Spiritual Care: Responded to a request via the on call phone Offered empathic listening and emotional support.  offered spiritual and emotional support.Family  was grateful and expressed appreciation for  visit.    Spiritual Care support can be requested via an Epic consult. For urgent/immediate needs, please contact the On Call  at: [place]: ext [phone]       Chaplain Garcia

## (undated) NOTE — LETTER
Date & Time: 3/10/2025, 4:29 PM  Patient: Joseph Angel  Encounter Provider(s):    Jodi Allen MD         This certifies that I, Joseph Angel, a patient at an [facility], am leaving the facility voluntarily and against the advice of my physician.    I acknowledge that I have been:    1. informed that my physician believes that I need to receive care here;  2. informed that if I leave, I could become sicker or even die; and  3. provided discharge instructions consistent with my current diagnosis.    I hereby release my physician, the facility, and its employees from all responsibility for any ill effects which may result from this action.        __________________________________  Patient or authorized caregiver signature    __________________________________  RN signature    If no patient or patient representative signature was obtained, sign below to acknowledge that the form was reviewed with the patient and that the patient refused to sign.    __________________________________  RN signature

## (undated) NOTE — LETTER
[facility] 4W/SW/SE  [address]  [facility] [address]  [phone]    Blood Transfusion Consent    In the course of your treatment, it may become necessary to administer a transfusion of blood or blood components. This form provides basic information concerning this procedure and, if signed by you, authorizes its administration. By signing this form, you agree that all of your questions about the administration of blood or blood products have been answered by the ordering medical professional or designee.    Description of Procedure  Blood is introduced into one of your veins, commonly in the arm, using a sterilized disposable needle. The amount of blood transfused, and whether the transfusion will be of blood or blood components is a judgement the physician will make based on your particular needs.    Risks  The transfusion is a common procedure of low risk.  MINOR AND TEMPORARY REACTIONS ARE NOT UNCOMMON, including a slight bruise, swelling or local reaction in the area where the needle pierces your skin, or a nonserious reaction to the transfused material itself, including headache, fever or mild skin reaction, such as rash.  Serious reactions are possible, though very unlikely, and include severe allergic reaction (shock) and destruction (hemolysis) of transfused blood cells.  Infectious diseases which are known to be transmitted by blood transfusion include certain types of viral Hepatitis(liver infection from a virus), Human Immunodeficiency Virus (HIV-1,2) infection, a viral infection known to cause Acquired Immunodeficiency Syndrome (AIDS), as well as certain other bacterial, viral, and parasitic diseases. While a minimal risk of acquiring an infectious disease from transfused blood exists, in accordance with the Federal and State law, all due care has been taken in donor selection and testing to avoid transmission of disease.    Alternatives  If loss of blood poses serious threats during your  treatment, THERE IS NO EFFECTIVE ALTERNATIVE TO BLOOD TRANSFUSION. However, if you have any further questions on this matter, your provider will fully explain the alternatives to you if it has not already been done.    I, ______________________________, have read/had read to me the above. I understand the matters bearing on the decision whether or not to authorize a transfusion of blood or blood components. I have no questions which have not been answered to my full satisfaction. I hereby consent to such transfusion as my physician may deem necessary or advisable in the course of my treatment.    ______________________________________________                    ___________________________  (Signature of Patient or Responsible party in case of minor,                 (Printed Name of Patient or incompetent, or unconscious patient)              Responsible Party)    ___________________________               _____________________  (Relationship to Patient if not self)                                    (Date and Time)    __________________________                                                           ______________________              (Signature of Witness)               (Printed Name of Witness)     Language line ()    Telephone/Verbal/Video Consent    __________________________                     ____________________  (Signature of 2nd Witness           (Printed Name of 2nd  Telephone/Verbal/Video Consent)           Witness)    Patient Name: Joseph Angel     : 1980                 Printed: 2025     Medical Record #: [id number]      Rev: 2023